# Patient Record
Sex: MALE | Race: WHITE | NOT HISPANIC OR LATINO | URBAN - METROPOLITAN AREA
[De-identification: names, ages, dates, MRNs, and addresses within clinical notes are randomized per-mention and may not be internally consistent; named-entity substitution may affect disease eponyms.]

---

## 2019-06-15 ENCOUNTER — APPOINTMENT (OUTPATIENT)
Dept: CT IMAGING | Facility: HOSPITAL | Age: 69
End: 2019-06-15

## 2019-06-15 ENCOUNTER — HOSPITAL ENCOUNTER (EMERGENCY)
Facility: HOSPITAL | Age: 69
Discharge: HOME | End: 2019-06-16
Attending: EMERGENCY MEDICINE | Admitting: EMERGENCY MEDICINE

## 2019-06-15 DIAGNOSIS — R19.7 DIARRHEA, UNSPECIFIED TYPE: Primary | ICD-10-CM

## 2019-06-15 LAB
ALBUMIN SERPL-MCNC: 4.2 G/DL (ref 3.5–5.2)
ALBUMIN/GLOB SERPL: 1.6 G/DL
ALP SERPL-CCNC: 43 U/L (ref 39–117)
ALT SERPL W P-5'-P-CCNC: 29 U/L (ref 1–41)
ANION GAP SERPL CALCULATED.3IONS-SCNC: 12.4 MMOL/L
AST SERPL-CCNC: 21 U/L (ref 1–40)
BASOPHILS # BLD AUTO: 0.02 10*3/MM3 (ref 0–0.2)
BASOPHILS NFR BLD AUTO: 0.3 % (ref 0–1.5)
BILIRUB SERPL-MCNC: 0.6 MG/DL (ref 0.2–1.2)
BUN BLD-MCNC: 17 MG/DL (ref 8–23)
BUN/CREAT SERPL: 16.2 (ref 7–25)
CALCIUM SPEC-SCNC: 9.3 MG/DL (ref 8.6–10.5)
CHLORIDE SERPL-SCNC: 105 MMOL/L (ref 98–107)
CO2 SERPL-SCNC: 26.6 MMOL/L (ref 22–29)
CREAT BLD-MCNC: 1.05 MG/DL (ref 0.76–1.27)
DEPRECATED RDW RBC AUTO: 42.1 FL (ref 37–54)
EOSINOPHIL # BLD AUTO: 0.22 10*3/MM3 (ref 0–0.4)
EOSINOPHIL NFR BLD AUTO: 3.3 % (ref 0.3–6.2)
ERYTHROCYTE [DISTWIDTH] IN BLOOD BY AUTOMATED COUNT: 11.8 % (ref 12.3–15.4)
GFR SERPL CREATININE-BSD FRML MDRD: 70 ML/MIN/1.73
GLOBULIN UR ELPH-MCNC: 2.7 GM/DL
GLUCOSE BLD-MCNC: 118 MG/DL (ref 65–99)
HCT VFR BLD AUTO: 44.8 % (ref 37.5–51)
HGB BLD-MCNC: 14.8 G/DL (ref 13–17.7)
IMM GRANULOCYTES # BLD AUTO: 0.01 10*3/MM3 (ref 0–0.05)
IMM GRANULOCYTES NFR BLD AUTO: 0.2 % (ref 0–0.5)
LIPASE SERPL-CCNC: 17 U/L (ref 13–60)
LYMPHOCYTES # BLD AUTO: 0.54 10*3/MM3 (ref 0.7–3.1)
LYMPHOCYTES NFR BLD AUTO: 8.2 % (ref 19.6–45.3)
MCH RBC QN AUTO: 32 PG (ref 26.6–33)
MCHC RBC AUTO-ENTMCNC: 33 G/DL (ref 31.5–35.7)
MCV RBC AUTO: 97 FL (ref 79–97)
MONOCYTES # BLD AUTO: 0.54 10*3/MM3 (ref 0.1–0.9)
MONOCYTES NFR BLD AUTO: 8.2 % (ref 5–12)
NEUTROPHILS # BLD AUTO: 5.27 10*3/MM3 (ref 1.7–7)
NEUTROPHILS NFR BLD AUTO: 79.8 % (ref 42.7–76)
NRBC BLD AUTO-RTO: 0 /100 WBC (ref 0–0.2)
PLATELET # BLD AUTO: 143 10*3/MM3 (ref 140–450)
PMV BLD AUTO: 10.5 FL (ref 6–12)
POTASSIUM BLD-SCNC: 4.2 MMOL/L (ref 3.5–5.2)
PROT SERPL-MCNC: 6.9 G/DL (ref 6–8.5)
RBC # BLD AUTO: 4.62 10*6/MM3 (ref 4.14–5.8)
SODIUM BLD-SCNC: 144 MMOL/L (ref 136–145)
WBC NRBC COR # BLD: 6.6 10*3/MM3 (ref 3.4–10.8)

## 2019-06-15 PROCEDURE — 83690 ASSAY OF LIPASE: CPT | Performed by: EMERGENCY MEDICINE

## 2019-06-15 PROCEDURE — 80053 COMPREHEN METABOLIC PANEL: CPT | Performed by: EMERGENCY MEDICINE

## 2019-06-15 PROCEDURE — 25010000002 IOPAMIDOL 61 % SOLUTION: Performed by: EMERGENCY MEDICINE

## 2019-06-15 PROCEDURE — 85025 COMPLETE CBC W/AUTO DIFF WBC: CPT | Performed by: EMERGENCY MEDICINE

## 2019-06-15 PROCEDURE — 74177 CT ABD & PELVIS W/CONTRAST: CPT

## 2019-06-15 PROCEDURE — 99284 EMERGENCY DEPT VISIT MOD MDM: CPT

## 2019-06-15 RX ORDER — ATORVASTATIN CALCIUM 40 MG/1
40 TABLET, FILM COATED ORAL DAILY
COMMUNITY
End: 2021-06-10 | Stop reason: SDUPTHER

## 2019-06-15 RX ORDER — SODIUM CHLORIDE 9 MG/ML
125 INJECTION, SOLUTION INTRAVENOUS CONTINUOUS
Status: DISCONTINUED | OUTPATIENT
Start: 2019-06-15 | End: 2019-06-16 | Stop reason: HOSPADM

## 2019-06-15 RX ORDER — RANITIDINE 150 MG/1
150 TABLET ORAL
COMMUNITY
End: 2021-06-10

## 2019-06-15 RX ORDER — ESOMEPRAZOLE MAGNESIUM 40 MG/1
40 CAPSULE, DELAYED RELEASE ORAL
COMMUNITY
End: 2021-12-10 | Stop reason: SINTOL

## 2019-06-15 RX ORDER — HYDROCHLOROTHIAZIDE 12.5 MG/1
12.5 TABLET ORAL DAILY
COMMUNITY

## 2019-06-15 RX ORDER — MELOXICAM 15 MG/1
15 TABLET ORAL DAILY
COMMUNITY
End: 2021-06-10 | Stop reason: SDUPTHER

## 2019-06-15 RX ORDER — GLIMEPIRIDE 2 MG/1
4 TABLET ORAL 2 TIMES DAILY
COMMUNITY
End: 2021-06-10 | Stop reason: DRUGHIGH

## 2019-06-15 RX ORDER — SODIUM CHLORIDE 0.9 % (FLUSH) 0.9 %
10 SYRINGE (ML) INJECTION AS NEEDED
Status: DISCONTINUED | OUTPATIENT
Start: 2019-06-15 | End: 2019-06-16 | Stop reason: HOSPADM

## 2019-06-15 RX ORDER — MORPHINE SULFATE 2 MG/ML
4 INJECTION, SOLUTION INTRAMUSCULAR; INTRAVENOUS ONCE
Status: COMPLETED | OUTPATIENT
Start: 2019-06-15 | End: 2019-06-16

## 2019-06-15 RX ORDER — LANOLIN ALCOHOL/MO/W.PET/CERES
3 CREAM (GRAM) TOPICAL NIGHTLY
COMMUNITY
End: 2022-05-06

## 2019-06-15 RX ORDER — ONDANSETRON 2 MG/ML
4 INJECTION INTRAMUSCULAR; INTRAVENOUS ONCE
Status: COMPLETED | OUTPATIENT
Start: 2019-06-15 | End: 2019-06-16

## 2019-06-15 RX ORDER — IRBESARTAN 300 MG/1
300 TABLET ORAL DAILY
COMMUNITY
End: 2021-06-10 | Stop reason: SDUPTHER

## 2019-06-15 RX ADMIN — IOPAMIDOL 85 ML: 612 INJECTION, SOLUTION INTRAVENOUS at 23:59

## 2019-06-16 VITALS
OXYGEN SATURATION: 93 % | WEIGHT: 215 LBS | HEIGHT: 73 IN | HEART RATE: 90 BPM | TEMPERATURE: 98.8 F | RESPIRATION RATE: 16 BRPM | BODY MASS INDEX: 28.49 KG/M2 | SYSTOLIC BLOOD PRESSURE: 133 MMHG | DIASTOLIC BLOOD PRESSURE: 74 MMHG

## 2019-06-16 PROCEDURE — 96374 THER/PROPH/DIAG INJ IV PUSH: CPT

## 2019-06-16 PROCEDURE — 25010000002 ONDANSETRON PER 1 MG: Performed by: EMERGENCY MEDICINE

## 2019-06-16 PROCEDURE — 96375 TX/PRO/DX INJ NEW DRUG ADDON: CPT

## 2019-06-16 PROCEDURE — 25010000002 MORPHINE PER 10 MG: Performed by: EMERGENCY MEDICINE

## 2019-06-16 PROCEDURE — 96361 HYDRATE IV INFUSION ADD-ON: CPT

## 2019-06-16 RX ORDER — ONDANSETRON 4 MG/1
4 TABLET, FILM COATED ORAL EVERY 8 HOURS PRN
Qty: 10 TABLET | Refills: 0 | Status: SHIPPED | OUTPATIENT
Start: 2019-06-16 | End: 2022-11-07

## 2019-06-16 RX ORDER — DIPHENOXYLATE HYDROCHLORIDE AND ATROPINE SULFATE 2.5; .025 MG/1; MG/1
1-2 TABLET ORAL 4 TIMES DAILY PRN
Qty: 20 TABLET | Refills: 0 | Status: SHIPPED | OUTPATIENT
Start: 2019-06-16 | End: 2021-06-10

## 2019-06-16 RX ADMIN — MORPHINE SULFATE 4 MG: 2 INJECTION, SOLUTION INTRAMUSCULAR; INTRAVENOUS at 00:07

## 2019-06-16 RX ADMIN — ONDANSETRON 4 MG: 2 INJECTION INTRAMUSCULAR; INTRAVENOUS at 00:05

## 2019-06-16 RX ADMIN — SODIUM CHLORIDE 125 ML/HR: 9 INJECTION, SOLUTION INTRAVENOUS at 00:03

## 2021-06-10 ENCOUNTER — LAB (OUTPATIENT)
Dept: LAB | Facility: HOSPITAL | Age: 71
End: 2021-06-10

## 2021-06-10 ENCOUNTER — OFFICE VISIT (OUTPATIENT)
Dept: INTERNAL MEDICINE | Facility: CLINIC | Age: 71
End: 2021-06-10

## 2021-06-10 VITALS
OXYGEN SATURATION: 98 % | HEART RATE: 59 BPM | HEIGHT: 73 IN | DIASTOLIC BLOOD PRESSURE: 70 MMHG | SYSTOLIC BLOOD PRESSURE: 142 MMHG | WEIGHT: 215.9 LBS | BODY MASS INDEX: 28.61 KG/M2

## 2021-06-10 DIAGNOSIS — K21.9 GASTROESOPHAGEAL REFLUX DISEASE WITHOUT ESOPHAGITIS: ICD-10-CM

## 2021-06-10 DIAGNOSIS — I10 ESSENTIAL HYPERTENSION: ICD-10-CM

## 2021-06-10 DIAGNOSIS — M19.90 ARTHRITIS: ICD-10-CM

## 2021-06-10 DIAGNOSIS — E11.59 TYPE 2 DIABETES MELLITUS WITH OTHER CIRCULATORY COMPLICATION, WITH LONG-TERM CURRENT USE OF INSULIN (HCC): Primary | ICD-10-CM

## 2021-06-10 DIAGNOSIS — Z79.4 TYPE 2 DIABETES MELLITUS WITH OTHER CIRCULATORY COMPLICATION, WITH LONG-TERM CURRENT USE OF INSULIN (HCC): Primary | ICD-10-CM

## 2021-06-10 DIAGNOSIS — K57.90 DIVERTICULOSIS: ICD-10-CM

## 2021-06-10 DIAGNOSIS — E78.2 MIXED HYPERLIPIDEMIA: ICD-10-CM

## 2021-06-10 LAB
ALBUMIN SERPL-MCNC: 4.6 G/DL (ref 3.5–5.2)
ALBUMIN UR-MCNC: 1.2 MG/DL
ALBUMIN/GLOB SERPL: 1.5 G/DL
ALP SERPL-CCNC: 49 U/L (ref 39–117)
ALT SERPL W P-5'-P-CCNC: 39 U/L (ref 1–41)
ANION GAP SERPL CALCULATED.3IONS-SCNC: 9.2 MMOL/L (ref 5–15)
AST SERPL-CCNC: 27 U/L (ref 1–40)
BILIRUB SERPL-MCNC: 0.4 MG/DL (ref 0–1.2)
BUN SERPL-MCNC: 26 MG/DL (ref 8–23)
BUN/CREAT SERPL: 33.8 (ref 7–25)
CALCIUM SPEC-SCNC: 9.7 MG/DL (ref 8.6–10.5)
CHLORIDE SERPL-SCNC: 102 MMOL/L (ref 98–107)
CHOLEST SERPL-MCNC: 227 MG/DL (ref 0–200)
CO2 SERPL-SCNC: 27.8 MMOL/L (ref 22–29)
CREAT SERPL-MCNC: 0.77 MG/DL (ref 0.76–1.27)
CREAT UR-MCNC: 140 MG/DL
GFR SERPL CREATININE-BSD FRML MDRD: 100 ML/MIN/1.73
GLOBULIN UR ELPH-MCNC: 3.1 GM/DL
GLUCOSE SERPL-MCNC: 179 MG/DL (ref 65–99)
HBA1C MFR BLD: 7.5 % (ref 4.8–5.6)
HDLC SERPL-MCNC: 49 MG/DL (ref 40–60)
LDLC SERPL CALC-MCNC: 156 MG/DL (ref 0–100)
LDLC/HDLC SERPL: 3.13 {RATIO}
MICROALBUMIN/CREAT UR: 8.6 MG/G
POTASSIUM SERPL-SCNC: 5 MMOL/L (ref 3.5–5.2)
PROT SERPL-MCNC: 7.7 G/DL (ref 6–8.5)
SODIUM SERPL-SCNC: 139 MMOL/L (ref 136–145)
TRIGL SERPL-MCNC: 122 MG/DL (ref 0–150)
VLDLC SERPL-MCNC: 22 MG/DL (ref 5–40)

## 2021-06-10 PROCEDURE — 83036 HEMOGLOBIN GLYCOSYLATED A1C: CPT | Performed by: FAMILY MEDICINE

## 2021-06-10 PROCEDURE — 82570 ASSAY OF URINE CREATININE: CPT | Performed by: FAMILY MEDICINE

## 2021-06-10 PROCEDURE — 80061 LIPID PANEL: CPT | Performed by: FAMILY MEDICINE

## 2021-06-10 PROCEDURE — 82043 UR ALBUMIN QUANTITATIVE: CPT | Performed by: FAMILY MEDICINE

## 2021-06-10 PROCEDURE — 80053 COMPREHEN METABOLIC PANEL: CPT | Performed by: FAMILY MEDICINE

## 2021-06-10 PROCEDURE — 36415 COLL VENOUS BLD VENIPUNCTURE: CPT | Performed by: FAMILY MEDICINE

## 2021-06-10 PROCEDURE — 99204 OFFICE O/P NEW MOD 45 MIN: CPT | Performed by: FAMILY MEDICINE

## 2021-06-10 RX ORDER — GLIMEPIRIDE 4 MG/1
4 TABLET ORAL 2 TIMES DAILY
COMMUNITY
Start: 2021-05-22

## 2021-06-10 RX ORDER — MECLIZINE HYDROCHLORIDE 25 MG/1
25 TABLET ORAL 3 TIMES DAILY PRN
COMMUNITY
End: 2022-11-07

## 2021-06-10 RX ORDER — VIT C/B6/B5/MAGNESIUM/HERB 173 50-5-6-5MG
1000 CAPSULE ORAL DAILY
COMMUNITY

## 2021-06-10 RX ORDER — IRBESARTAN 300 MG/1
300 TABLET ORAL DAILY
Qty: 90 TABLET | Refills: 3 | Status: SHIPPED | OUTPATIENT
Start: 2021-06-10 | End: 2022-08-23

## 2021-06-10 RX ORDER — LANOLIN ALCOHOL/MO/W.PET/CERES
3 CREAM (GRAM) TOPICAL DAILY
COMMUNITY
End: 2022-05-06

## 2021-06-10 RX ORDER — MELOXICAM 15 MG/1
15 TABLET ORAL DAILY
Qty: 90 TABLET | Refills: 1 | Status: SHIPPED | OUTPATIENT
Start: 2021-06-10 | End: 2021-12-06

## 2021-06-10 RX ORDER — ATORVASTATIN CALCIUM 40 MG/1
40 TABLET, FILM COATED ORAL DAILY
Qty: 90 TABLET | Refills: 3 | Status: SHIPPED | OUTPATIENT
Start: 2021-06-10 | End: 2022-08-25

## 2021-06-10 RX ORDER — DULAGLUTIDE 0.75 MG/.5ML
INJECTION, SOLUTION SUBCUTANEOUS
COMMUNITY
Start: 2021-05-27

## 2021-06-10 RX ORDER — NAPROXEN 500 MG/1
500 TABLET ORAL 2 TIMES DAILY PRN
COMMUNITY
Start: 2020-12-04 | End: 2021-12-04

## 2021-09-20 ENCOUNTER — TELEPHONE (OUTPATIENT)
Dept: INTERNAL MEDICINE | Facility: CLINIC | Age: 71
End: 2021-09-20

## 2021-12-06 RX ORDER — MELOXICAM 15 MG/1
TABLET ORAL
Qty: 90 TABLET | Refills: 0 | Status: SHIPPED | OUTPATIENT
Start: 2021-12-06 | End: 2022-02-25

## 2021-12-10 ENCOUNTER — OFFICE VISIT (OUTPATIENT)
Dept: INTERNAL MEDICINE | Facility: CLINIC | Age: 71
End: 2021-12-10

## 2021-12-10 ENCOUNTER — LAB (OUTPATIENT)
Dept: LAB | Facility: HOSPITAL | Age: 71
End: 2021-12-10

## 2021-12-10 VITALS
BODY MASS INDEX: 28.97 KG/M2 | WEIGHT: 218.6 LBS | OXYGEN SATURATION: 98 % | SYSTOLIC BLOOD PRESSURE: 134 MMHG | HEART RATE: 72 BPM | HEIGHT: 73 IN | DIASTOLIC BLOOD PRESSURE: 64 MMHG

## 2021-12-10 DIAGNOSIS — K21.9 GASTROESOPHAGEAL REFLUX DISEASE WITHOUT ESOPHAGITIS: ICD-10-CM

## 2021-12-10 DIAGNOSIS — I10 ESSENTIAL HYPERTENSION: Primary | ICD-10-CM

## 2021-12-10 DIAGNOSIS — Z12.11 COLON CANCER SCREENING: ICD-10-CM

## 2021-12-10 DIAGNOSIS — K57.90 DIVERTICULOSIS: ICD-10-CM

## 2021-12-10 DIAGNOSIS — E78.2 MIXED HYPERLIPIDEMIA: ICD-10-CM

## 2021-12-10 DIAGNOSIS — Z00.00 MEDICARE ANNUAL WELLNESS VISIT, SUBSEQUENT: ICD-10-CM

## 2021-12-10 LAB
ALBUMIN SERPL-MCNC: 4.4 G/DL (ref 3.5–5.2)
ALBUMIN/GLOB SERPL: 1.4 G/DL
ALP SERPL-CCNC: 52 U/L (ref 39–117)
ALT SERPL W P-5'-P-CCNC: 26 U/L (ref 1–41)
ANION GAP SERPL CALCULATED.3IONS-SCNC: 9.8 MMOL/L (ref 5–15)
AST SERPL-CCNC: 17 U/L (ref 1–40)
BILIRUB SERPL-MCNC: 0.5 MG/DL (ref 0–1.2)
BUN SERPL-MCNC: 22 MG/DL (ref 8–23)
BUN/CREAT SERPL: 18 (ref 7–25)
CALCIUM SPEC-SCNC: 9.6 MG/DL (ref 8.6–10.5)
CHLORIDE SERPL-SCNC: 103 MMOL/L (ref 98–107)
CHOLEST SERPL-MCNC: 138 MG/DL (ref 0–200)
CO2 SERPL-SCNC: 29.2 MMOL/L (ref 22–29)
CREAT SERPL-MCNC: 1.22 MG/DL (ref 0.76–1.27)
GFR SERPL CREATININE-BSD FRML MDRD: 58 ML/MIN/1.73
GLOBULIN UR ELPH-MCNC: 3.1 GM/DL
GLUCOSE SERPL-MCNC: 209 MG/DL (ref 65–99)
HDLC SERPL-MCNC: 51 MG/DL (ref 40–60)
LDLC SERPL CALC-MCNC: 72 MG/DL (ref 0–100)
LDLC/HDLC SERPL: 1.42 {RATIO}
POTASSIUM SERPL-SCNC: 5 MMOL/L (ref 3.5–5.2)
PROT SERPL-MCNC: 7.5 G/DL (ref 6–8.5)
SODIUM SERPL-SCNC: 142 MMOL/L (ref 136–145)
TRIGL SERPL-MCNC: 73 MG/DL (ref 0–150)
VLDLC SERPL-MCNC: 15 MG/DL (ref 5–40)

## 2021-12-10 PROCEDURE — 1170F FXNL STATUS ASSESSED: CPT | Performed by: FAMILY MEDICINE

## 2021-12-10 PROCEDURE — G0439 PPPS, SUBSEQ VISIT: HCPCS | Performed by: FAMILY MEDICINE

## 2021-12-10 PROCEDURE — 1125F AMNT PAIN NOTED PAIN PRSNT: CPT | Performed by: FAMILY MEDICINE

## 2021-12-10 PROCEDURE — 1160F RVW MEDS BY RX/DR IN RCRD: CPT | Performed by: FAMILY MEDICINE

## 2021-12-10 PROCEDURE — 80061 LIPID PANEL: CPT | Performed by: FAMILY MEDICINE

## 2021-12-10 PROCEDURE — 99214 OFFICE O/P EST MOD 30 MIN: CPT | Performed by: FAMILY MEDICINE

## 2021-12-10 PROCEDURE — 80053 COMPREHEN METABOLIC PANEL: CPT | Performed by: FAMILY MEDICINE

## 2021-12-10 PROCEDURE — 36415 COLL VENOUS BLD VENIPUNCTURE: CPT | Performed by: FAMILY MEDICINE

## 2021-12-10 RX ORDER — CLOTRIMAZOLE AND BETAMETHASONE DIPROPIONATE 10; .64 MG/G; MG/G
CREAM TOPICAL EVERY 12 HOURS
COMMUNITY
Start: 2021-09-16 | End: 2022-03-15

## 2021-12-10 RX ORDER — FAMOTIDINE 40 MG/1
40 TABLET, FILM COATED ORAL NIGHTLY PRN
Qty: 90 TABLET | Refills: 3 | Status: SHIPPED | OUTPATIENT
Start: 2021-12-10 | End: 2022-11-07

## 2021-12-10 RX ORDER — TERBINAFINE HYDROCHLORIDE 250 MG/1
250 TABLET ORAL DAILY
COMMUNITY
Start: 2021-11-11 | End: 2022-11-07

## 2022-02-25 RX ORDER — MELOXICAM 15 MG/1
TABLET ORAL
Qty: 90 TABLET | Refills: 0 | Status: SHIPPED | OUTPATIENT
Start: 2022-02-25 | End: 2022-07-20

## 2022-05-06 ENCOUNTER — OFFICE VISIT (OUTPATIENT)
Dept: INTERNAL MEDICINE | Facility: CLINIC | Age: 72
End: 2022-05-06

## 2022-05-06 VITALS
DIASTOLIC BLOOD PRESSURE: 68 MMHG | HEIGHT: 73 IN | OXYGEN SATURATION: 96 % | HEART RATE: 61 BPM | BODY MASS INDEX: 28.71 KG/M2 | WEIGHT: 216.6 LBS | SYSTOLIC BLOOD PRESSURE: 118 MMHG

## 2022-05-06 DIAGNOSIS — Z12.11 SCREENING FOR COLON CANCER: ICD-10-CM

## 2022-05-06 DIAGNOSIS — I10 ESSENTIAL HYPERTENSION: Primary | ICD-10-CM

## 2022-05-06 DIAGNOSIS — K21.9 GASTROESOPHAGEAL REFLUX DISEASE WITHOUT ESOPHAGITIS: ICD-10-CM

## 2022-05-06 DIAGNOSIS — E78.2 MIXED HYPERLIPIDEMIA: ICD-10-CM

## 2022-05-06 PROCEDURE — 99214 OFFICE O/P EST MOD 30 MIN: CPT | Performed by: FAMILY MEDICINE

## 2022-05-24 ENCOUNTER — PRE-PROCEDURE SCREENING (OUTPATIENT)
Dept: GASTROENTEROLOGY | Facility: CLINIC | Age: 72
End: 2022-05-24

## 2022-07-20 RX ORDER — MELOXICAM 15 MG/1
TABLET ORAL
Qty: 90 TABLET | Refills: 0 | Status: SHIPPED | OUTPATIENT
Start: 2022-07-20 | End: 2022-10-13

## 2022-08-23 DIAGNOSIS — I10 ESSENTIAL HYPERTENSION: ICD-10-CM

## 2022-08-23 RX ORDER — IRBESARTAN 300 MG/1
TABLET ORAL
Qty: 90 TABLET | Refills: 0 | Status: SHIPPED | OUTPATIENT
Start: 2022-08-23 | End: 2022-11-07 | Stop reason: SDUPTHER

## 2022-08-24 DIAGNOSIS — E78.2 MIXED HYPERLIPIDEMIA: ICD-10-CM

## 2022-08-25 RX ORDER — ATORVASTATIN CALCIUM 40 MG/1
TABLET, FILM COATED ORAL
Qty: 90 TABLET | Refills: 0 | Status: SHIPPED | OUTPATIENT
Start: 2022-08-25 | End: 2022-11-07 | Stop reason: SDUPTHER

## 2022-10-07 ENCOUNTER — OFFICE VISIT (OUTPATIENT)
Dept: INTERNAL MEDICINE | Facility: CLINIC | Age: 72
End: 2022-10-07

## 2022-10-07 VITALS
OXYGEN SATURATION: 97 % | DIASTOLIC BLOOD PRESSURE: 70 MMHG | HEART RATE: 78 BPM | SYSTOLIC BLOOD PRESSURE: 120 MMHG | RESPIRATION RATE: 18 BRPM | HEIGHT: 73 IN | BODY MASS INDEX: 28.23 KG/M2 | WEIGHT: 213 LBS

## 2022-10-07 DIAGNOSIS — H61.22 LEFT EAR IMPACTED CERUMEN: Primary | ICD-10-CM

## 2022-10-07 PROCEDURE — 99213 OFFICE O/P EST LOW 20 MIN: CPT | Performed by: NURSE PRACTITIONER

## 2022-10-07 RX ORDER — PEN NEEDLE, DIABETIC 32GX 5/32"
1 NEEDLE, DISPOSABLE MISCELLANEOUS 3 TIMES DAILY
COMMUNITY
Start: 2022-09-22

## 2022-10-13 RX ORDER — MELOXICAM 15 MG/1
TABLET ORAL
Qty: 90 TABLET | Refills: 0 | Status: SHIPPED | OUTPATIENT
Start: 2022-10-13 | End: 2022-12-30

## 2022-11-07 ENCOUNTER — OFFICE VISIT (OUTPATIENT)
Dept: INTERNAL MEDICINE | Facility: CLINIC | Age: 72
End: 2022-11-07

## 2022-11-07 VITALS
SYSTOLIC BLOOD PRESSURE: 120 MMHG | BODY MASS INDEX: 27.65 KG/M2 | WEIGHT: 208.6 LBS | HEIGHT: 73 IN | HEART RATE: 72 BPM | OXYGEN SATURATION: 94 % | DIASTOLIC BLOOD PRESSURE: 60 MMHG

## 2022-11-07 DIAGNOSIS — Z12.11 COLON CANCER SCREENING: ICD-10-CM

## 2022-11-07 DIAGNOSIS — E78.2 MIXED HYPERLIPIDEMIA: ICD-10-CM

## 2022-11-07 DIAGNOSIS — I10 ESSENTIAL HYPERTENSION: Primary | ICD-10-CM

## 2022-11-07 DIAGNOSIS — J40 BRONCHITIS: ICD-10-CM

## 2022-11-07 PROCEDURE — 99214 OFFICE O/P EST MOD 30 MIN: CPT | Performed by: FAMILY MEDICINE

## 2022-11-07 RX ORDER — AZITHROMYCIN 250 MG/1
TABLET, FILM COATED ORAL
Qty: 6 TABLET | Refills: 0 | Status: SHIPPED | OUTPATIENT
Start: 2022-11-07

## 2022-11-07 RX ORDER — IRBESARTAN 300 MG/1
300 TABLET ORAL DAILY
Qty: 90 TABLET | Refills: 2 | Status: SHIPPED | OUTPATIENT
Start: 2022-11-07

## 2022-11-07 RX ORDER — ATORVASTATIN CALCIUM 40 MG/1
40 TABLET, FILM COATED ORAL DAILY
Qty: 90 TABLET | Refills: 2 | Status: SHIPPED | OUTPATIENT
Start: 2022-11-07

## 2022-12-27 ENCOUNTER — PREP FOR SURGERY (OUTPATIENT)
Dept: OTHER | Facility: HOSPITAL | Age: 72
End: 2022-12-27

## 2022-12-27 DIAGNOSIS — Z12.11 COLON CANCER SCREENING: Primary | ICD-10-CM

## 2022-12-30 RX ORDER — MELOXICAM 15 MG/1
TABLET ORAL
Qty: 90 TABLET | Refills: 0 | Status: SHIPPED | OUTPATIENT
Start: 2022-12-30

## 2023-03-26 ENCOUNTER — APPOINTMENT (OUTPATIENT)
Dept: GENERAL RADIOLOGY | Facility: HOSPITAL | Age: 73
End: 2023-03-26
Payer: MEDICARE

## 2023-03-26 ENCOUNTER — APPOINTMENT (OUTPATIENT)
Dept: CT IMAGING | Facility: HOSPITAL | Age: 73
End: 2023-03-26
Payer: MEDICARE

## 2023-03-26 ENCOUNTER — HOSPITAL ENCOUNTER (EMERGENCY)
Facility: HOSPITAL | Age: 73
Discharge: HOME | End: 2023-03-27
Attending: EMERGENCY MEDICINE | Admitting: EMERGENCY MEDICINE
Payer: MEDICARE

## 2023-03-26 DIAGNOSIS — S30.0XXA CONTUSION OF COCCYX, INITIAL ENCOUNTER: ICD-10-CM

## 2023-03-26 DIAGNOSIS — S20.212A RIB CONTUSION, LEFT, INITIAL ENCOUNTER: ICD-10-CM

## 2023-03-26 DIAGNOSIS — S09.90XA INJURY OF HEAD, INITIAL ENCOUNTER: Primary | ICD-10-CM

## 2023-03-26 DIAGNOSIS — S39.012A STRAIN OF LUMBAR REGION, INITIAL ENCOUNTER: ICD-10-CM

## 2023-03-26 DIAGNOSIS — S16.1XXA STRAIN OF NECK MUSCLE, INITIAL ENCOUNTER: ICD-10-CM

## 2023-03-26 DIAGNOSIS — S43.402A SPRAIN OF LEFT SHOULDER, UNSPECIFIED SHOULDER SPRAIN TYPE, INITIAL ENCOUNTER: ICD-10-CM

## 2023-03-26 DIAGNOSIS — S93.492A SPRAIN OF ANTERIOR TALOFIBULAR LIGAMENT OF LEFT ANKLE, INITIAL ENCOUNTER: ICD-10-CM

## 2023-03-26 DIAGNOSIS — S29.019A THORACIC MYOFASCIAL STRAIN, INITIAL ENCOUNTER: ICD-10-CM

## 2023-03-26 PROCEDURE — 73610 X-RAY EXAM OF ANKLE: CPT

## 2023-03-26 PROCEDURE — 70450 CT HEAD/BRAIN W/O DYE: CPT

## 2023-03-26 PROCEDURE — 72220 X-RAY EXAM SACRUM TAILBONE: CPT

## 2023-03-26 PROCEDURE — 72125 CT NECK SPINE W/O DYE: CPT

## 2023-03-26 PROCEDURE — 72110 X-RAY EXAM L-2 SPINE 4/>VWS: CPT

## 2023-03-26 PROCEDURE — 71250 CT THORAX DX C-: CPT

## 2023-03-26 PROCEDURE — 72170 X-RAY EXAM OF PELVIS: CPT

## 2023-03-26 PROCEDURE — 99283 EMERGENCY DEPT VISIT LOW MDM: CPT

## 2023-03-26 RX ORDER — OXYCODONE HYDROCHLORIDE AND ACETAMINOPHEN 5; 325 MG/1; MG/1
1 TABLET ORAL ONCE
Status: COMPLETED | OUTPATIENT
Start: 2023-03-26 | End: 2023-03-27

## 2023-03-27 ENCOUNTER — APPOINTMENT (OUTPATIENT)
Dept: GENERAL RADIOLOGY | Facility: HOSPITAL | Age: 73
End: 2023-03-27
Payer: MEDICARE

## 2023-03-27 VITALS
RESPIRATION RATE: 17 BRPM | SYSTOLIC BLOOD PRESSURE: 164 MMHG | BODY MASS INDEX: 29.5 KG/M2 | DIASTOLIC BLOOD PRESSURE: 75 MMHG | TEMPERATURE: 98 F | OXYGEN SATURATION: 95 % | WEIGHT: 217.81 LBS | HEART RATE: 71 BPM | HEIGHT: 72 IN

## 2023-03-27 PROCEDURE — 73030 X-RAY EXAM OF SHOULDER: CPT

## 2023-03-27 RX ORDER — HYDROCODONE BITARTRATE AND ACETAMINOPHEN 5; 325 MG/1; MG/1
1 TABLET ORAL EVERY 6 HOURS PRN
Qty: 12 TABLET | Refills: 0 | Status: SHIPPED | OUTPATIENT
Start: 2023-03-27

## 2023-03-27 RX ORDER — HYDROCODONE BITARTRATE AND ACETAMINOPHEN 7.5; 325 MG/1; MG/1
1 TABLET ORAL ONCE
Status: COMPLETED | OUTPATIENT
Start: 2023-03-27 | End: 2023-03-27

## 2023-03-27 RX ORDER — METHOCARBAMOL 750 MG/1
750 TABLET, FILM COATED ORAL 3 TIMES DAILY PRN
Qty: 21 TABLET | Refills: 0 | Status: SHIPPED | OUTPATIENT
Start: 2023-03-27

## 2023-03-27 RX ADMIN — OXYCODONE AND ACETAMINOPHEN 1 TABLET: 5; 325 TABLET ORAL at 00:02

## 2023-03-27 RX ADMIN — HYDROCODONE BITARTRATE AND ACETAMINOPHEN 1 TABLET: 7.5; 325 TABLET ORAL at 03:47

## 2023-04-25 RX ORDER — MELOXICAM 15 MG/1
TABLET ORAL
Qty: 90 TABLET | Refills: 0 | Status: SHIPPED | OUTPATIENT
Start: 2023-04-25

## 2023-05-09 ENCOUNTER — OFFICE VISIT (OUTPATIENT)
Dept: INTERNAL MEDICINE | Facility: CLINIC | Age: 73
End: 2023-05-09
Payer: MEDICARE

## 2023-05-09 VITALS
HEART RATE: 72 BPM | WEIGHT: 213.8 LBS | SYSTOLIC BLOOD PRESSURE: 146 MMHG | HEIGHT: 72 IN | BODY MASS INDEX: 28.96 KG/M2 | OXYGEN SATURATION: 96 % | DIASTOLIC BLOOD PRESSURE: 80 MMHG

## 2023-05-09 DIAGNOSIS — N52.1 ERECTILE DYSFUNCTION DUE TO DISEASES CLASSIFIED ELSEWHERE: ICD-10-CM

## 2023-05-09 DIAGNOSIS — E11.59 TYPE 2 DIABETES MELLITUS WITH OTHER CIRCULATORY COMPLICATION, WITH LONG-TERM CURRENT USE OF INSULIN: ICD-10-CM

## 2023-05-09 DIAGNOSIS — R41.3 MEMORY LOSS: ICD-10-CM

## 2023-05-09 DIAGNOSIS — I10 ESSENTIAL HYPERTENSION: ICD-10-CM

## 2023-05-09 DIAGNOSIS — Z00.00 MEDICARE ANNUAL WELLNESS VISIT, SUBSEQUENT: Primary | ICD-10-CM

## 2023-05-09 DIAGNOSIS — E78.2 MIXED HYPERLIPIDEMIA: ICD-10-CM

## 2023-05-09 DIAGNOSIS — Z79.4 TYPE 2 DIABETES MELLITUS WITH OTHER CIRCULATORY COMPLICATION, WITH LONG-TERM CURRENT USE OF INSULIN: ICD-10-CM

## 2023-05-09 DIAGNOSIS — R53.82 CHRONIC FATIGUE: ICD-10-CM

## 2023-05-09 DIAGNOSIS — M25.572 ACUTE LEFT ANKLE PAIN: ICD-10-CM

## 2023-05-13 LAB
BASOPHILS # BLD AUTO: 0.06 10*3/MM3 (ref 0–0.2)
BASOPHILS NFR BLD AUTO: 0.8 % (ref 0–1.5)
EOSINOPHIL # BLD AUTO: 0.45 10*3/MM3 (ref 0–0.4)
EOSINOPHIL NFR BLD AUTO: 6.2 % (ref 0.3–6.2)
ERYTHROCYTE [DISTWIDTH] IN BLOOD BY AUTOMATED COUNT: 11.3 % (ref 12.3–15.4)
HCT VFR BLD AUTO: 43.2 % (ref 37.5–51)
HGB BLD-MCNC: 14.5 G/DL (ref 13–17.7)
IMM GRANULOCYTES # BLD AUTO: 0.01 10*3/MM3 (ref 0–0.05)
IMM GRANULOCYTES NFR BLD AUTO: 0.1 % (ref 0–0.5)
LYMPHOCYTES # BLD AUTO: 1.52 10*3/MM3 (ref 0.7–3.1)
LYMPHOCYTES NFR BLD AUTO: 20.9 % (ref 19.6–45.3)
MCH RBC QN AUTO: 32.4 PG (ref 26.6–33)
MCHC RBC AUTO-ENTMCNC: 33.6 G/DL (ref 31.5–35.7)
MCV RBC AUTO: 96.4 FL (ref 79–97)
MONOCYTES # BLD AUTO: 0.66 10*3/MM3 (ref 0.1–0.9)
MONOCYTES NFR BLD AUTO: 9.1 % (ref 5–12)
NEUTROPHILS # BLD AUTO: 4.59 10*3/MM3 (ref 1.7–7)
NEUTROPHILS NFR BLD AUTO: 62.9 % (ref 42.7–76)
NRBC BLD AUTO-RTO: 0 /100 WBC (ref 0–0.2)
PLATELET # BLD AUTO: 206 10*3/MM3 (ref 140–450)
RBC # BLD AUTO: 4.48 10*6/MM3 (ref 4.14–5.8)
TESTOST FREE SERPL-MCNC: >50 PG/ML (ref 6.6–18.1)
TESTOST SERPL-MCNC: 487.3 NG/DL (ref 264–916)
WBC # BLD AUTO: 7.29 10*3/MM3 (ref 3.4–10.8)

## 2023-05-25 LAB
BASOPHILS # BLD AUTO: 0.05 10*3/MM3 (ref 0–0.2)
BASOPHILS NFR BLD AUTO: 0.7 % (ref 0–1.5)
EOSINOPHIL # BLD AUTO: 0.54 10*3/MM3 (ref 0–0.4)
EOSINOPHIL NFR BLD AUTO: 7.6 % (ref 0.3–6.2)
ERYTHROCYTE [DISTWIDTH] IN BLOOD BY AUTOMATED COUNT: 11 % (ref 12.3–15.4)
HCT VFR BLD AUTO: 41.6 % (ref 37.5–51)
HGB BLD-MCNC: 13.7 G/DL (ref 13–17.7)
IMM GRANULOCYTES # BLD AUTO: 0.02 10*3/MM3 (ref 0–0.05)
IMM GRANULOCYTES NFR BLD AUTO: 0.3 % (ref 0–0.5)
LYMPHOCYTES # BLD AUTO: 1.37 10*3/MM3 (ref 0.7–3.1)
LYMPHOCYTES NFR BLD AUTO: 19.4 % (ref 19.6–45.3)
MCH RBC QN AUTO: 31.7 PG (ref 26.6–33)
MCHC RBC AUTO-ENTMCNC: 32.9 G/DL (ref 31.5–35.7)
MCV RBC AUTO: 96.3 FL (ref 79–97)
MONOCYTES # BLD AUTO: 0.53 10*3/MM3 (ref 0.1–0.9)
MONOCYTES NFR BLD AUTO: 7.5 % (ref 5–12)
NEUTROPHILS # BLD AUTO: 4.56 10*3/MM3 (ref 1.7–7)
NEUTROPHILS NFR BLD AUTO: 64.5 % (ref 42.7–76)
NRBC BLD AUTO-RTO: 0 /100 WBC (ref 0–0.2)
PLATELET # BLD AUTO: 228 10*3/MM3 (ref 140–450)
RBC # BLD AUTO: 4.32 10*6/MM3 (ref 4.14–5.8)
WBC # BLD AUTO: 7.07 10*3/MM3 (ref 3.4–10.8)

## 2023-06-14 ENCOUNTER — APPOINTMENT (OUTPATIENT)
Dept: GENERAL RADIOLOGY | Facility: HOSPITAL | Age: 73
End: 2023-06-14
Payer: MEDICARE

## 2023-06-14 ENCOUNTER — HOSPITAL ENCOUNTER (INPATIENT)
Facility: HOSPITAL | Age: 73
LOS: 2 days | Discharge: HOME | End: 2023-06-16
Attending: EMERGENCY MEDICINE | Admitting: INTERNAL MEDICINE
Payer: MEDICARE

## 2023-06-14 ENCOUNTER — APPOINTMENT (OUTPATIENT)
Dept: CT IMAGING | Facility: HOSPITAL | Age: 73
End: 2023-06-14
Payer: MEDICARE

## 2023-06-14 DIAGNOSIS — E78.2 MIXED HYPERLIPIDEMIA: ICD-10-CM

## 2023-06-14 DIAGNOSIS — E11.59 TYPE 2 DIABETES MELLITUS WITH OTHER CIRCULATORY COMPLICATION, WITH LONG-TERM CURRENT USE OF INSULIN: ICD-10-CM

## 2023-06-14 DIAGNOSIS — I26.09 OTHER ACUTE PULMONARY EMBOLISM WITH ACUTE COR PULMONALE: Primary | ICD-10-CM

## 2023-06-14 DIAGNOSIS — Z79.4 TYPE 2 DIABETES MELLITUS WITH OTHER CIRCULATORY COMPLICATION, WITH LONG-TERM CURRENT USE OF INSULIN: ICD-10-CM

## 2023-06-14 DIAGNOSIS — I10 ESSENTIAL HYPERTENSION: ICD-10-CM

## 2023-06-14 LAB
ALBUMIN SERPL-MCNC: 4.1 G/DL (ref 3.5–5.2)
ALBUMIN/GLOB SERPL: 1.4 G/DL
ALP SERPL-CCNC: 62 U/L (ref 39–117)
ALT SERPL W P-5'-P-CCNC: 22 U/L (ref 1–41)
ANION GAP SERPL CALCULATED.3IONS-SCNC: 11.3 MMOL/L (ref 5–15)
APTT PPP: 26.5 SECONDS (ref 22.7–35.4)
AST SERPL-CCNC: 18 U/L (ref 1–40)
BASOPHILS # BLD AUTO: 0.04 10*3/MM3 (ref 0–0.2)
BASOPHILS NFR BLD AUTO: 0.6 % (ref 0–1.5)
BILIRUB SERPL-MCNC: 0.5 MG/DL (ref 0–1.2)
BUN SERPL-MCNC: 23 MG/DL (ref 8–23)
BUN/CREAT SERPL: 21.9 (ref 7–25)
CALCIUM SPEC-SCNC: 9.6 MG/DL (ref 8.6–10.5)
CHLORIDE SERPL-SCNC: 103 MMOL/L (ref 98–107)
CO2 SERPL-SCNC: 23.7 MMOL/L (ref 22–29)
CREAT SERPL-MCNC: 1.05 MG/DL (ref 0.76–1.27)
D DIMER PPP FEU-MCNC: 2.4 MCGFEU/ML (ref 0–0.73)
DEPRECATED RDW RBC AUTO: 38.9 FL (ref 37–54)
EGFRCR SERPLBLD CKD-EPI 2021: 75 ML/MIN/1.73
EOSINOPHIL # BLD AUTO: 0.17 10*3/MM3 (ref 0–0.4)
EOSINOPHIL NFR BLD AUTO: 2.6 % (ref 0.3–6.2)
ERYTHROCYTE [DISTWIDTH] IN BLOOD BY AUTOMATED COUNT: 11.4 % (ref 12.3–15.4)
GLOBULIN UR ELPH-MCNC: 2.9 GM/DL
GLUCOSE BLDC GLUCOMTR-MCNC: 175 MG/DL (ref 70–130)
GLUCOSE SERPL-MCNC: 228 MG/DL (ref 65–99)
HCT VFR BLD AUTO: 38.9 % (ref 37.5–51)
HGB BLD-MCNC: 13.1 G/DL (ref 13–17.7)
IMM GRANULOCYTES # BLD AUTO: 0.01 10*3/MM3 (ref 0–0.05)
IMM GRANULOCYTES NFR BLD AUTO: 0.2 % (ref 0–0.5)
INR PPP: 1 (ref 0.9–1.1)
LYMPHOCYTES # BLD AUTO: 1.13 10*3/MM3 (ref 0.7–3.1)
LYMPHOCYTES NFR BLD AUTO: 17.2 % (ref 19.6–45.3)
MCH RBC QN AUTO: 31.7 PG (ref 26.6–33)
MCHC RBC AUTO-ENTMCNC: 33.7 G/DL (ref 31.5–35.7)
MCV RBC AUTO: 94.2 FL (ref 79–97)
MONOCYTES # BLD AUTO: 0.5 10*3/MM3 (ref 0.1–0.9)
MONOCYTES NFR BLD AUTO: 7.6 % (ref 5–12)
NEUTROPHILS NFR BLD AUTO: 4.71 10*3/MM3 (ref 1.7–7)
NEUTROPHILS NFR BLD AUTO: 71.8 % (ref 42.7–76)
NRBC BLD AUTO-RTO: 0 /100 WBC (ref 0–0.2)
PLATELET # BLD AUTO: 185 10*3/MM3 (ref 140–450)
PMV BLD AUTO: 9.9 FL (ref 6–12)
POTASSIUM SERPL-SCNC: 4.3 MMOL/L (ref 3.5–5.2)
PROT SERPL-MCNC: 7 G/DL (ref 6–8.5)
PROTHROMBIN TIME: 13.2 SECONDS (ref 11.7–14.2)
QT INTERVAL: 382 MS
RBC # BLD AUTO: 4.13 10*6/MM3 (ref 4.14–5.8)
SODIUM SERPL-SCNC: 138 MMOL/L (ref 136–145)
TROPONIN T SERPL HS-MCNC: 15 NG/L
WBC NRBC COR # BLD: 6.56 10*3/MM3 (ref 3.4–10.8)

## 2023-06-14 PROCEDURE — 82948 REAGENT STRIP/BLOOD GLUCOSE: CPT

## 2023-06-14 PROCEDURE — 93005 ELECTROCARDIOGRAM TRACING: CPT | Performed by: EMERGENCY MEDICINE

## 2023-06-14 PROCEDURE — 25510000001 IOPAMIDOL PER 1 ML: Performed by: EMERGENCY MEDICINE

## 2023-06-14 PROCEDURE — 25010000002 HEPARIN (PORCINE) PER 1000 UNITS: Performed by: EMERGENCY MEDICINE

## 2023-06-14 PROCEDURE — 85379 FIBRIN DEGRADATION QUANT: CPT | Performed by: EMERGENCY MEDICINE

## 2023-06-14 PROCEDURE — 85610 PROTHROMBIN TIME: CPT | Performed by: EMERGENCY MEDICINE

## 2023-06-14 PROCEDURE — 25010000002 HEPARIN (PORCINE) 25000-0.45 UT/250ML-% SOLUTION: Performed by: EMERGENCY MEDICINE

## 2023-06-14 PROCEDURE — 84484 ASSAY OF TROPONIN QUANT: CPT | Performed by: EMERGENCY MEDICINE

## 2023-06-14 PROCEDURE — 85730 THROMBOPLASTIN TIME PARTIAL: CPT | Performed by: EMERGENCY MEDICINE

## 2023-06-14 PROCEDURE — 71275 CT ANGIOGRAPHY CHEST: CPT

## 2023-06-14 PROCEDURE — 80053 COMPREHEN METABOLIC PANEL: CPT | Performed by: EMERGENCY MEDICINE

## 2023-06-14 PROCEDURE — 93005 ELECTROCARDIOGRAM TRACING: CPT

## 2023-06-14 PROCEDURE — 63710000001 INSULIN LISPRO (HUMAN) PER 5 UNITS: Performed by: INTERNAL MEDICINE

## 2023-06-14 PROCEDURE — 85025 COMPLETE CBC W/AUTO DIFF WBC: CPT | Performed by: EMERGENCY MEDICINE

## 2023-06-14 PROCEDURE — 71045 X-RAY EXAM CHEST 1 VIEW: CPT

## 2023-06-14 RX ORDER — ONDANSETRON 4 MG/1
4 TABLET, FILM COATED ORAL EVERY 6 HOURS PRN
Status: DISCONTINUED | OUTPATIENT
Start: 2023-06-14 | End: 2023-06-16 | Stop reason: HOSPADM

## 2023-06-14 RX ORDER — POLYETHYLENE GLYCOL 3350 17 G/17G
17 POWDER, FOR SOLUTION ORAL DAILY PRN
Status: DISCONTINUED | OUTPATIENT
Start: 2023-06-14 | End: 2023-06-16 | Stop reason: HOSPADM

## 2023-06-14 RX ORDER — UREA 10 %
3 LOTION (ML) TOPICAL NIGHTLY PRN
Status: DISCONTINUED | OUTPATIENT
Start: 2023-06-14 | End: 2023-06-16 | Stop reason: HOSPADM

## 2023-06-14 RX ORDER — HEPARIN SODIUM 10000 [USP'U]/100ML
16.3 INJECTION, SOLUTION INTRAVENOUS
Status: DISCONTINUED | OUTPATIENT
Start: 2023-06-14 | End: 2023-06-15

## 2023-06-14 RX ORDER — HEPARIN SODIUM 5000 [USP'U]/ML
80 INJECTION, SOLUTION INTRAVENOUS; SUBCUTANEOUS ONCE
Status: DISCONTINUED | OUTPATIENT
Start: 2023-06-14 | End: 2023-06-14

## 2023-06-14 RX ORDER — HEPARIN SODIUM 5000 [USP'U]/ML
80 INJECTION, SOLUTION INTRAVENOUS; SUBCUTANEOUS ONCE
Status: COMPLETED | OUTPATIENT
Start: 2023-06-14 | End: 2023-06-14

## 2023-06-14 RX ORDER — ONDANSETRON 2 MG/ML
4 INJECTION INTRAMUSCULAR; INTRAVENOUS EVERY 6 HOURS PRN
Status: DISCONTINUED | OUTPATIENT
Start: 2023-06-14 | End: 2023-06-16 | Stop reason: HOSPADM

## 2023-06-14 RX ORDER — INSULIN LISPRO 100 [IU]/ML
2-9 INJECTION, SOLUTION INTRAVENOUS; SUBCUTANEOUS
Status: DISCONTINUED | OUTPATIENT
Start: 2023-06-14 | End: 2023-06-16 | Stop reason: HOSPADM

## 2023-06-14 RX ORDER — AMOXICILLIN 250 MG
2 CAPSULE ORAL 2 TIMES DAILY
Status: DISCONTINUED | OUTPATIENT
Start: 2023-06-14 | End: 2023-06-16 | Stop reason: HOSPADM

## 2023-06-14 RX ORDER — DEXTROSE MONOHYDRATE 25 G/50ML
25 INJECTION, SOLUTION INTRAVENOUS
Status: DISCONTINUED | OUTPATIENT
Start: 2023-06-14 | End: 2023-06-16 | Stop reason: HOSPADM

## 2023-06-14 RX ORDER — BISACODYL 10 MG
10 SUPPOSITORY, RECTAL RECTAL DAILY PRN
Status: DISCONTINUED | OUTPATIENT
Start: 2023-06-14 | End: 2023-06-16 | Stop reason: HOSPADM

## 2023-06-14 RX ORDER — NICOTINE POLACRILEX 4 MG
15 LOZENGE BUCCAL
Status: DISCONTINUED | OUTPATIENT
Start: 2023-06-14 | End: 2023-06-16 | Stop reason: HOSPADM

## 2023-06-14 RX ORDER — HEPARIN SODIUM 5000 [USP'U]/ML
40-80 INJECTION, SOLUTION INTRAVENOUS; SUBCUTANEOUS EVERY 6 HOURS PRN
Status: DISCONTINUED | OUTPATIENT
Start: 2023-06-14 | End: 2023-06-15

## 2023-06-14 RX ORDER — IBUPROFEN 600 MG/1
1 TABLET ORAL
Status: DISCONTINUED | OUTPATIENT
Start: 2023-06-14 | End: 2023-06-16 | Stop reason: HOSPADM

## 2023-06-14 RX ORDER — ACETAMINOPHEN 325 MG/1
650 TABLET ORAL EVERY 4 HOURS PRN
Status: DISCONTINUED | OUTPATIENT
Start: 2023-06-14 | End: 2023-06-16 | Stop reason: HOSPADM

## 2023-06-14 RX ORDER — BISACODYL 5 MG/1
5 TABLET, DELAYED RELEASE ORAL DAILY PRN
Status: DISCONTINUED | OUTPATIENT
Start: 2023-06-14 | End: 2023-06-16 | Stop reason: HOSPADM

## 2023-06-14 RX ADMIN — INSULIN LISPRO 2 UNITS: 100 INJECTION, SOLUTION INTRAVENOUS; SUBCUTANEOUS at 21:59

## 2023-06-14 RX ADMIN — IOPAMIDOL 95 ML: 755 INJECTION, SOLUTION INTRAVENOUS at 16:14

## 2023-06-14 RX ADMIN — DOCUSATE SODIUM 50MG AND SENNOSIDES 8.6MG 2 TABLET: 8.6; 5 TABLET, FILM COATED ORAL at 22:04

## 2023-06-14 RX ADMIN — HEPARIN SODIUM 16.3 UNITS/KG/HR: 10000 INJECTION, SOLUTION INTRAVENOUS at 17:33

## 2023-06-14 RX ADMIN — HEPARIN SODIUM 7400 UNITS: 5000 INJECTION INTRAVENOUS; SUBCUTANEOUS at 17:33

## 2023-06-15 ENCOUNTER — APPOINTMENT (OUTPATIENT)
Dept: CT IMAGING | Facility: HOSPITAL | Age: 73
End: 2023-06-15
Payer: MEDICARE

## 2023-06-15 ENCOUNTER — APPOINTMENT (OUTPATIENT)
Dept: CARDIOLOGY | Facility: HOSPITAL | Age: 73
End: 2023-06-15
Payer: MEDICARE

## 2023-06-15 LAB
ANION GAP SERPL CALCULATED.3IONS-SCNC: 9.4 MMOL/L (ref 5–15)
APTT PPP: 87.8 SECONDS (ref 22.7–35.4)
BASOPHILS # BLD AUTO: 0.03 10*3/MM3 (ref 0–0.2)
BASOPHILS NFR BLD AUTO: 0.5 % (ref 0–1.5)
BH CV LOW VAS LEFT LESSER SAPH VESSEL: 1
BH CV LOW VAS LEFT POSTERIOR TIBIAL VESSEL: 1
BH CV LOW VAS RIGHT LESSER SAPH VESSEL: 1
BH CV LOWER VASCULAR LEFT COMMON FEMORAL COMPETENT: NORMAL
BH CV LOWER VASCULAR LEFT COMMON FEMORAL COMPRESS: NORMAL
BH CV LOWER VASCULAR LEFT COMMON FEMORAL PHASIC: NORMAL
BH CV LOWER VASCULAR LEFT COMMON FEMORAL SPONT: NORMAL
BH CV LOWER VASCULAR LEFT DISTAL FEMORAL COMPRESS: NORMAL
BH CV LOWER VASCULAR LEFT GASTRONEMIUS COMPRESS: NORMAL
BH CV LOWER VASCULAR LEFT GREATER SAPH AK COMPRESS: NORMAL
BH CV LOWER VASCULAR LEFT GREATER SAPH BK COMPRESS: NORMAL
BH CV LOWER VASCULAR LEFT LESSER SAPH COMPRESS: NORMAL
BH CV LOWER VASCULAR LEFT LESSER SAPH THROMBUS: NORMAL
BH CV LOWER VASCULAR LEFT MID FEMORAL COMPETENT: NORMAL
BH CV LOWER VASCULAR LEFT MID FEMORAL COMPRESS: NORMAL
BH CV LOWER VASCULAR LEFT MID FEMORAL PHASIC: NORMAL
BH CV LOWER VASCULAR LEFT MID FEMORAL SPONT: NORMAL
BH CV LOWER VASCULAR LEFT MID FEMORAL THROMBUS: NORMAL
BH CV LOWER VASCULAR LEFT PERONEAL COMPRESS: NORMAL
BH CV LOWER VASCULAR LEFT POPLITEAL COMPETENT: NORMAL
BH CV LOWER VASCULAR LEFT POPLITEAL COMPRESS: NORMAL
BH CV LOWER VASCULAR LEFT POPLITEAL PHASIC: NORMAL
BH CV LOWER VASCULAR LEFT POPLITEAL SPONT: NORMAL
BH CV LOWER VASCULAR LEFT POSTERIOR TIBIAL COMPRESS: NORMAL
BH CV LOWER VASCULAR LEFT POSTERIOR TIBIAL THROMBUS: NORMAL
BH CV LOWER VASCULAR LEFT PROFUNDA FEMORAL COMPRESS: NORMAL
BH CV LOWER VASCULAR LEFT PROXIMAL FEMORAL COMPRESS: NORMAL
BH CV LOWER VASCULAR LEFT SAPHENOFEMORAL JUNCTION COMPRESS: NORMAL
BH CV LOWER VASCULAR RIGHT COMMON FEMORAL AUGMENT: NORMAL
BH CV LOWER VASCULAR RIGHT COMMON FEMORAL COMPETENT: NORMAL
BH CV LOWER VASCULAR RIGHT COMMON FEMORAL COMPRESS: NORMAL
BH CV LOWER VASCULAR RIGHT COMMON FEMORAL PHASIC: NORMAL
BH CV LOWER VASCULAR RIGHT COMMON FEMORAL SPONT: NORMAL
BH CV LOWER VASCULAR RIGHT DISTAL FEMORAL COMPRESS: NORMAL
BH CV LOWER VASCULAR RIGHT GASTRONEMIUS COMPRESS: NORMAL
BH CV LOWER VASCULAR RIGHT GREATER SAPH AK COMPRESS: NORMAL
BH CV LOWER VASCULAR RIGHT GREATER SAPH BK COMPRESS: NORMAL
BH CV LOWER VASCULAR RIGHT LESSER SAPH COMPRESS: NORMAL
BH CV LOWER VASCULAR RIGHT LESSER SAPH THROMBUS: NORMAL
BH CV LOWER VASCULAR RIGHT MID FEMORAL AUGMENT: NORMAL
BH CV LOWER VASCULAR RIGHT MID FEMORAL COMPETENT: NORMAL
BH CV LOWER VASCULAR RIGHT MID FEMORAL COMPRESS: NORMAL
BH CV LOWER VASCULAR RIGHT MID FEMORAL PHASIC: NORMAL
BH CV LOWER VASCULAR RIGHT MID FEMORAL SPONT: NORMAL
BH CV LOWER VASCULAR RIGHT PERONEAL COMPRESS: NORMAL
BH CV LOWER VASCULAR RIGHT POPLITEAL AUGMENT: NORMAL
BH CV LOWER VASCULAR RIGHT POPLITEAL COMPETENT: NORMAL
BH CV LOWER VASCULAR RIGHT POPLITEAL COMPRESS: NORMAL
BH CV LOWER VASCULAR RIGHT POPLITEAL PHASIC: NORMAL
BH CV LOWER VASCULAR RIGHT POPLITEAL SPONT: NORMAL
BH CV LOWER VASCULAR RIGHT POSTERIOR TIBIAL COMPRESS: NORMAL
BH CV LOWER VASCULAR RIGHT PROFUNDA FEMORAL COMPRESS: NORMAL
BH CV LOWER VASCULAR RIGHT PROXIMAL FEMORAL COMPRESS: NORMAL
BH CV LOWER VASCULAR RIGHT SAPHENOFEMORAL JUNCTION COMPRESS: NORMAL
BH CV VAS PRELIMINARY FINDINGS SCRIPTING: 1
BUN SERPL-MCNC: 18 MG/DL (ref 8–23)
BUN/CREAT SERPL: 19.1 (ref 7–25)
CALCIUM SPEC-SCNC: 9.8 MG/DL (ref 8.6–10.5)
CHLORIDE SERPL-SCNC: 102 MMOL/L (ref 98–107)
CO2 SERPL-SCNC: 26.6 MMOL/L (ref 22–29)
CREAT SERPL-MCNC: 0.94 MG/DL (ref 0.76–1.27)
DEPRECATED RDW RBC AUTO: 39 FL (ref 37–54)
EGFRCR SERPLBLD CKD-EPI 2021: 85.6 ML/MIN/1.73
EOSINOPHIL # BLD AUTO: 0.28 10*3/MM3 (ref 0–0.4)
EOSINOPHIL NFR BLD AUTO: 4.5 % (ref 0.3–6.2)
ERYTHROCYTE [DISTWIDTH] IN BLOOD BY AUTOMATED COUNT: 11.5 % (ref 12.3–15.4)
GLUCOSE BLDC GLUCOMTR-MCNC: 167 MG/DL (ref 70–130)
GLUCOSE BLDC GLUCOMTR-MCNC: 168 MG/DL (ref 70–130)
GLUCOSE BLDC GLUCOMTR-MCNC: 268 MG/DL (ref 70–130)
GLUCOSE BLDC GLUCOMTR-MCNC: 299 MG/DL (ref 70–130)
GLUCOSE SERPL-MCNC: 158 MG/DL (ref 65–99)
HCT VFR BLD AUTO: 39.2 % (ref 37.5–51)
HGB BLD-MCNC: 13.3 G/DL (ref 13–17.7)
IMM GRANULOCYTES # BLD AUTO: 0.03 10*3/MM3 (ref 0–0.05)
IMM GRANULOCYTES NFR BLD AUTO: 0.5 % (ref 0–0.5)
LYMPHOCYTES # BLD AUTO: 1.54 10*3/MM3 (ref 0.7–3.1)
LYMPHOCYTES NFR BLD AUTO: 24.5 % (ref 19.6–45.3)
MCH RBC QN AUTO: 31.6 PG (ref 26.6–33)
MCHC RBC AUTO-ENTMCNC: 33.9 G/DL (ref 31.5–35.7)
MCV RBC AUTO: 93.1 FL (ref 79–97)
MONOCYTES # BLD AUTO: 0.64 10*3/MM3 (ref 0.1–0.9)
MONOCYTES NFR BLD AUTO: 10.2 % (ref 5–12)
NEUTROPHILS NFR BLD AUTO: 3.77 10*3/MM3 (ref 1.7–7)
NEUTROPHILS NFR BLD AUTO: 59.8 % (ref 42.7–76)
NRBC BLD AUTO-RTO: 0 /100 WBC (ref 0–0.2)
PLATELET # BLD AUTO: 186 10*3/MM3 (ref 140–450)
PMV BLD AUTO: 10.4 FL (ref 6–12)
POTASSIUM SERPL-SCNC: 4.1 MMOL/L (ref 3.5–5.2)
RBC # BLD AUTO: 4.21 10*6/MM3 (ref 4.14–5.8)
SODIUM SERPL-SCNC: 138 MMOL/L (ref 136–145)
WBC NRBC COR # BLD: 6.29 10*3/MM3 (ref 3.4–10.8)

## 2023-06-15 PROCEDURE — 0 DIATRIZOATE MEGLUMINE & SODIUM PER 1 ML: Performed by: INTERNAL MEDICINE

## 2023-06-15 PROCEDURE — 82948 REAGENT STRIP/BLOOD GLUCOSE: CPT

## 2023-06-15 PROCEDURE — 36415 COLL VENOUS BLD VENIPUNCTURE: CPT | Performed by: EMERGENCY MEDICINE

## 2023-06-15 PROCEDURE — 85025 COMPLETE CBC W/AUTO DIFF WBC: CPT | Performed by: EMERGENCY MEDICINE

## 2023-06-15 PROCEDURE — 74177 CT ABD & PELVIS W/CONTRAST: CPT

## 2023-06-15 PROCEDURE — 25510000001 IOPAMIDOL 61 % SOLUTION: Performed by: HOSPITALIST

## 2023-06-15 PROCEDURE — 85730 THROMBOPLASTIN TIME PARTIAL: CPT | Performed by: EMERGENCY MEDICINE

## 2023-06-15 PROCEDURE — 80048 BASIC METABOLIC PNL TOTAL CA: CPT | Performed by: INTERNAL MEDICINE

## 2023-06-15 PROCEDURE — 25010000002 HEPARIN (PORCINE) 25000-0.45 UT/250ML-% SOLUTION: Performed by: EMERGENCY MEDICINE

## 2023-06-15 PROCEDURE — 63710000001 INSULIN LISPRO (HUMAN) PER 5 UNITS: Performed by: INTERNAL MEDICINE

## 2023-06-15 PROCEDURE — 93970 EXTREMITY STUDY: CPT

## 2023-06-15 RX ORDER — INSULIN LISPRO 100 [IU]/ML
2-9 INJECTION, SOLUTION INTRAVENOUS; SUBCUTANEOUS
Status: DISCONTINUED | OUTPATIENT
Start: 2023-06-15 | End: 2023-06-15 | Stop reason: SDUPTHER

## 2023-06-15 RX ORDER — ATORVASTATIN CALCIUM 20 MG/1
40 TABLET, FILM COATED ORAL NIGHTLY
Status: DISCONTINUED | OUTPATIENT
Start: 2023-06-15 | End: 2023-06-16 | Stop reason: HOSPADM

## 2023-06-15 RX ORDER — IBUPROFEN 600 MG/1
1 TABLET ORAL
Status: DISCONTINUED | OUTPATIENT
Start: 2023-06-15 | End: 2023-06-15 | Stop reason: SDUPTHER

## 2023-06-15 RX ORDER — HYDROCHLOROTHIAZIDE 12.5 MG/1
12.5 TABLET ORAL DAILY
Status: DISCONTINUED | OUTPATIENT
Start: 2023-06-15 | End: 2023-06-16 | Stop reason: HOSPADM

## 2023-06-15 RX ORDER — LOSARTAN POTASSIUM 100 MG/1
100 TABLET ORAL
Status: DISCONTINUED | OUTPATIENT
Start: 2023-06-15 | End: 2023-06-16 | Stop reason: HOSPADM

## 2023-06-15 RX ORDER — DEXTROSE MONOHYDRATE 25 G/50ML
25 INJECTION, SOLUTION INTRAVENOUS
Status: DISCONTINUED | OUTPATIENT
Start: 2023-06-15 | End: 2023-06-15 | Stop reason: SDUPTHER

## 2023-06-15 RX ORDER — NICOTINE POLACRILEX 4 MG
15 LOZENGE BUCCAL
Status: DISCONTINUED | OUTPATIENT
Start: 2023-06-15 | End: 2023-06-15 | Stop reason: SDUPTHER

## 2023-06-15 RX ADMIN — INSULIN LISPRO 6 UNITS: 100 INJECTION, SOLUTION INTRAVENOUS; SUBCUTANEOUS at 21:26

## 2023-06-15 RX ADMIN — INSULIN LISPRO 2 UNITS: 100 INJECTION, SOLUTION INTRAVENOUS; SUBCUTANEOUS at 08:20

## 2023-06-15 RX ADMIN — DOCUSATE SODIUM 50MG AND SENNOSIDES 8.6MG 2 TABLET: 8.6; 5 TABLET, FILM COATED ORAL at 08:20

## 2023-06-15 RX ADMIN — DIATRIZOATE MEGLUMINE AND DIATRIZOATE SODIUM 30 ML: 660; 100 LIQUID ORAL; RECTAL at 12:43

## 2023-06-15 RX ADMIN — IOPAMIDOL 85 ML: 612 INJECTION, SOLUTION INTRAVENOUS at 15:14

## 2023-06-15 RX ADMIN — LOSARTAN POTASSIUM 100 MG: 100 TABLET, FILM COATED ORAL at 12:56

## 2023-06-15 RX ADMIN — HEPARIN SODIUM 16.3 UNITS/KG/HR: 10000 INJECTION, SOLUTION INTRAVENOUS at 07:51

## 2023-06-15 RX ADMIN — DOCUSATE SODIUM 50MG AND SENNOSIDES 8.6MG 2 TABLET: 8.6; 5 TABLET, FILM COATED ORAL at 21:19

## 2023-06-15 RX ADMIN — ATORVASTATIN CALCIUM 40 MG: 20 TABLET, FILM COATED ORAL at 21:19

## 2023-06-15 RX ADMIN — INSULIN LISPRO 6 UNITS: 100 INJECTION, SOLUTION INTRAVENOUS; SUBCUTANEOUS at 12:57

## 2023-06-15 RX ADMIN — INSULIN GLARGINE-YFGN 25 UNITS: 100 INJECTION, SOLUTION SUBCUTANEOUS at 12:56

## 2023-06-15 RX ADMIN — RIVAROXABAN 15 MG: 15 TABLET, FILM COATED ORAL at 17:57

## 2023-06-15 RX ADMIN — HYDROCHLOROTHIAZIDE 12.5 MG: 12.5 TABLET ORAL at 17:56

## 2023-06-15 RX ADMIN — INSULIN LISPRO 2 UNITS: 100 INJECTION, SOLUTION INTRAVENOUS; SUBCUTANEOUS at 17:56

## 2023-06-16 ENCOUNTER — READMISSION MANAGEMENT (OUTPATIENT)
Dept: CALL CENTER | Facility: HOSPITAL | Age: 73
End: 2023-06-16
Payer: MEDICARE

## 2023-06-16 VITALS
HEIGHT: 73 IN | WEIGHT: 200.3 LBS | RESPIRATION RATE: 16 BRPM | SYSTOLIC BLOOD PRESSURE: 127 MMHG | BODY MASS INDEX: 26.54 KG/M2 | TEMPERATURE: 97.7 F | DIASTOLIC BLOOD PRESSURE: 79 MMHG | OXYGEN SATURATION: 91 % | HEART RATE: 75 BPM

## 2023-06-16 DIAGNOSIS — I26.09 OTHER ACUTE PULMONARY EMBOLISM WITH ACUTE COR PULMONALE: Primary | ICD-10-CM

## 2023-06-16 DIAGNOSIS — Z86.711 PERSONAL HISTORY OF PULMONARY EMBOLISM: ICD-10-CM

## 2023-06-16 LAB
GLUCOSE BLDC GLUCOMTR-MCNC: 177 MG/DL (ref 70–130)
GLUCOSE BLDC GLUCOMTR-MCNC: 310 MG/DL (ref 70–130)

## 2023-06-16 PROCEDURE — 82948 REAGENT STRIP/BLOOD GLUCOSE: CPT

## 2023-06-16 PROCEDURE — 63710000001 INSULIN LISPRO (HUMAN) PER 5 UNITS: Performed by: INTERNAL MEDICINE

## 2023-06-16 RX ADMIN — RIVAROXABAN 15 MG: 15 TABLET, FILM COATED ORAL at 08:24

## 2023-06-16 RX ADMIN — HYDROCHLOROTHIAZIDE 12.5 MG: 12.5 TABLET ORAL at 08:24

## 2023-06-16 RX ADMIN — INSULIN LISPRO 2 UNITS: 100 INJECTION, SOLUTION INTRAVENOUS; SUBCUTANEOUS at 08:23

## 2023-06-16 RX ADMIN — DOCUSATE SODIUM 50MG AND SENNOSIDES 8.6MG 2 TABLET: 8.6; 5 TABLET, FILM COATED ORAL at 08:24

## 2023-06-16 RX ADMIN — INSULIN LISPRO 7 UNITS: 100 INJECTION, SOLUTION INTRAVENOUS; SUBCUTANEOUS at 13:03

## 2023-06-16 RX ADMIN — LOSARTAN POTASSIUM 100 MG: 100 TABLET, FILM COATED ORAL at 08:24

## 2023-06-16 RX ADMIN — INSULIN GLARGINE-YFGN 25 UNITS: 100 INJECTION, SOLUTION SUBCUTANEOUS at 08:25

## 2023-06-19 ENCOUNTER — TRANSITIONAL CARE MANAGEMENT TELEPHONE ENCOUNTER (OUTPATIENT)
Dept: CALL CENTER | Facility: HOSPITAL | Age: 73
End: 2023-06-19
Payer: MEDICARE

## 2023-06-27 PROBLEM — R79.89 ELEVATED TESTOSTERONE LEVEL IN MALE: Status: ACTIVE | Noted: 2023-06-27

## 2023-06-27 PROBLEM — J40 BRONCHITIS: Status: RESOLVED | Noted: 2022-11-07 | Resolved: 2023-06-27

## 2023-08-01 ENCOUNTER — TELEPHONE (OUTPATIENT)
Dept: INTERNAL MEDICINE | Facility: CLINIC | Age: 73
End: 2023-08-01
Payer: MEDICARE

## 2023-08-04 ENCOUNTER — HOSPITAL ENCOUNTER (OUTPATIENT)
Dept: MRI IMAGING | Facility: HOSPITAL | Age: 73
Discharge: HOME | End: 2023-08-04
Admitting: STUDENT IN AN ORGANIZED HEALTH CARE EDUCATION/TRAINING PROGRAM
Payer: MEDICARE

## 2023-08-04 DIAGNOSIS — R41.9 COGNITIVE COMPLAINTS: ICD-10-CM

## 2023-08-04 PROCEDURE — 70551 MRI BRAIN STEM W/O DYE: CPT

## 2023-08-10 RX ORDER — SENNOSIDES 8.6 MG
650 CAPSULE ORAL EVERY 8 HOURS PRN
COMMUNITY

## 2023-08-11 ENCOUNTER — HOSPITAL ENCOUNTER (OUTPATIENT)
Facility: HOSPITAL | Age: 73
Setting detail: HOSPITAL OUTPATIENT SURGERY
Discharge: HOME | End: 2023-08-11
Attending: SURGERY | Admitting: SURGERY
Payer: MEDICARE

## 2023-08-11 ENCOUNTER — ANESTHESIA (OUTPATIENT)
Dept: GASTROENTEROLOGY | Facility: HOSPITAL | Age: 73
End: 2023-08-11
Payer: MEDICARE

## 2023-08-11 ENCOUNTER — ANESTHESIA EVENT (OUTPATIENT)
Dept: GASTROENTEROLOGY | Facility: HOSPITAL | Age: 73
End: 2023-08-11
Payer: MEDICARE

## 2023-08-11 VITALS
DIASTOLIC BLOOD PRESSURE: 64 MMHG | RESPIRATION RATE: 16 BRPM | SYSTOLIC BLOOD PRESSURE: 119 MMHG | BODY MASS INDEX: 26.8 KG/M2 | HEIGHT: 73 IN | HEART RATE: 68 BPM | WEIGHT: 202.2 LBS | OXYGEN SATURATION: 95 %

## 2023-08-11 LAB — GLUCOSE BLDC GLUCOMTR-MCNC: 151 MG/DL (ref 70–130)

## 2023-08-11 PROCEDURE — S0260 H&P FOR SURGERY: HCPCS | Performed by: SURGERY

## 2023-08-11 PROCEDURE — 82948 REAGENT STRIP/BLOOD GLUCOSE: CPT

## 2023-08-11 PROCEDURE — 25010000002 PROPOFOL 10 MG/ML EMULSION: Performed by: NURSE ANESTHETIST, CERTIFIED REGISTERED

## 2023-08-11 PROCEDURE — G0121 COLON CA SCRN NOT HI RSK IND: HCPCS | Performed by: SURGERY

## 2023-08-11 RX ORDER — LIDOCAINE HYDROCHLORIDE 20 MG/ML
INJECTION, SOLUTION INFILTRATION; PERINEURAL AS NEEDED
Status: DISCONTINUED | OUTPATIENT
Start: 2023-08-11 | End: 2023-08-11 | Stop reason: SURG

## 2023-08-11 RX ORDER — PROPOFOL 10 MG/ML
VIAL (ML) INTRAVENOUS AS NEEDED
Status: DISCONTINUED | OUTPATIENT
Start: 2023-08-11 | End: 2023-08-11 | Stop reason: SURG

## 2023-08-11 RX ORDER — PROPOFOL 10 MG/ML
VIAL (ML) INTRAVENOUS CONTINUOUS PRN
Status: DISCONTINUED | OUTPATIENT
Start: 2023-08-11 | End: 2023-08-11 | Stop reason: SURG

## 2023-08-11 RX ORDER — SODIUM CHLORIDE, SODIUM LACTATE, POTASSIUM CHLORIDE, CALCIUM CHLORIDE 600; 310; 30; 20 MG/100ML; MG/100ML; MG/100ML; MG/100ML
30 INJECTION, SOLUTION INTRAVENOUS CONTINUOUS PRN
Status: DISCONTINUED | OUTPATIENT
Start: 2023-08-11 | End: 2023-08-11 | Stop reason: HOSPADM

## 2023-08-11 RX ADMIN — Medication 100 MG: at 10:48

## 2023-08-11 RX ADMIN — LIDOCAINE HYDROCHLORIDE 60 MG: 20 INJECTION, SOLUTION INFILTRATION; PERINEURAL at 10:48

## 2023-08-11 RX ADMIN — PROPOFOL 180 MCG/KG/MIN: 10 INJECTION, EMULSION INTRAVENOUS at 10:48

## 2023-08-11 RX ADMIN — SODIUM CHLORIDE, POTASSIUM CHLORIDE, SODIUM LACTATE AND CALCIUM CHLORIDE 30 ML/HR: 600; 310; 30; 20 INJECTION, SOLUTION INTRAVENOUS at 09:34

## 2023-11-15 DIAGNOSIS — K21.9 GASTROESOPHAGEAL REFLUX DISEASE WITHOUT ESOPHAGITIS: ICD-10-CM

## 2023-11-15 DIAGNOSIS — I10 ESSENTIAL HYPERTENSION: ICD-10-CM

## 2023-11-15 DIAGNOSIS — E78.2 MIXED HYPERLIPIDEMIA: ICD-10-CM

## 2023-11-15 RX ORDER — IRBESARTAN 300 MG/1
300 TABLET ORAL DAILY
Qty: 90 TABLET | Refills: 0 | Status: SHIPPED | OUTPATIENT
Start: 2023-11-15

## 2023-11-15 RX ORDER — ATORVASTATIN CALCIUM 40 MG/1
40 TABLET, FILM COATED ORAL DAILY
Qty: 90 TABLET | Refills: 0 | Status: SHIPPED | OUTPATIENT
Start: 2023-11-15

## 2023-11-15 RX ORDER — FAMOTIDINE 40 MG/1
TABLET, FILM COATED ORAL
Qty: 90 TABLET | Refills: 0 | OUTPATIENT
Start: 2023-11-15

## 2023-11-28 LAB — HBA1C MFR BLD: 8 %

## 2023-12-04 ENCOUNTER — HOSPITAL ENCOUNTER (OUTPATIENT)
Dept: CARDIOLOGY | Facility: HOSPITAL | Age: 73
Discharge: HOME | End: 2023-12-04
Admitting: INTERNAL MEDICINE
Payer: MEDICARE

## 2023-12-04 DIAGNOSIS — I26.09 OTHER ACUTE PULMONARY EMBOLISM WITH ACUTE COR PULMONALE: ICD-10-CM

## 2023-12-04 DIAGNOSIS — Z86.711 PERSONAL HISTORY OF PULMONARY EMBOLISM: ICD-10-CM

## 2023-12-04 LAB
BH CV LOW VAS LEFT LESSER SAPH VESSEL: 1
BH CV LOW VAS LEFT MID FEMORAL SPONT: 1
BH CV LOW VAS RIGHT LESSER SAPH VESSEL: 1
BH CV LOWER VASCULAR LEFT COMMON FEMORAL AUGMENT: NORMAL
BH CV LOWER VASCULAR LEFT COMMON FEMORAL COMPETENT: NORMAL
BH CV LOWER VASCULAR LEFT COMMON FEMORAL COMPRESS: NORMAL
BH CV LOWER VASCULAR LEFT COMMON FEMORAL PHASIC: NORMAL
BH CV LOWER VASCULAR LEFT COMMON FEMORAL SPONT: NORMAL
BH CV LOWER VASCULAR LEFT DISTAL FEMORAL COMPRESS: NORMAL
BH CV LOWER VASCULAR LEFT GASTRONEMIUS COMPRESS: NORMAL
BH CV LOWER VASCULAR LEFT GREATER SAPH AK COMPRESS: NORMAL
BH CV LOWER VASCULAR LEFT GREATER SAPH BK COMPRESS: NORMAL
BH CV LOWER VASCULAR LEFT LESSER SAPH COMPRESS: NORMAL
BH CV LOWER VASCULAR LEFT LESSER SAPH THROMBUS: NORMAL
BH CV LOWER VASCULAR LEFT MID FEMORAL AUGMENT: NORMAL
BH CV LOWER VASCULAR LEFT MID FEMORAL COMPETENT: NORMAL
BH CV LOWER VASCULAR LEFT MID FEMORAL COMPRESS: NORMAL
BH CV LOWER VASCULAR LEFT MID FEMORAL PHASIC: NORMAL
BH CV LOWER VASCULAR LEFT MID FEMORAL SPONT: NORMAL
BH CV LOWER VASCULAR LEFT MID FEMORAL THROMBUS: NORMAL
BH CV LOWER VASCULAR LEFT PERONEAL COMPRESS: NORMAL
BH CV LOWER VASCULAR LEFT POPLITEAL AUGMENT: NORMAL
BH CV LOWER VASCULAR LEFT POPLITEAL COMPETENT: NORMAL
BH CV LOWER VASCULAR LEFT POPLITEAL COMPRESS: NORMAL
BH CV LOWER VASCULAR LEFT POPLITEAL PHASIC: NORMAL
BH CV LOWER VASCULAR LEFT POPLITEAL SPONT: NORMAL
BH CV LOWER VASCULAR LEFT POSTERIOR TIBIAL COMPRESS: NORMAL
BH CV LOWER VASCULAR LEFT PROFUNDA FEMORAL COMPRESS: NORMAL
BH CV LOWER VASCULAR LEFT PROXIMAL FEMORAL COMPRESS: NORMAL
BH CV LOWER VASCULAR LEFT SAPHENOFEMORAL JUNCTION COMPRESS: NORMAL
BH CV LOWER VASCULAR RIGHT COMMON FEMORAL AUGMENT: NORMAL
BH CV LOWER VASCULAR RIGHT COMMON FEMORAL COMPETENT: NORMAL
BH CV LOWER VASCULAR RIGHT COMMON FEMORAL COMPRESS: NORMAL
BH CV LOWER VASCULAR RIGHT COMMON FEMORAL PHASIC: NORMAL
BH CV LOWER VASCULAR RIGHT COMMON FEMORAL SPONT: NORMAL
BH CV LOWER VASCULAR RIGHT DISTAL FEMORAL COMPRESS: NORMAL
BH CV LOWER VASCULAR RIGHT GASTRONEMIUS COMPRESS: NORMAL
BH CV LOWER VASCULAR RIGHT GREATER SAPH AK COMPRESS: NORMAL
BH CV LOWER VASCULAR RIGHT GREATER SAPH BK COMPRESS: NORMAL
BH CV LOWER VASCULAR RIGHT LESSER SAPH COMPRESS: NORMAL
BH CV LOWER VASCULAR RIGHT LESSER SAPH THROMBUS: NORMAL
BH CV LOWER VASCULAR RIGHT MID FEMORAL AUGMENT: NORMAL
BH CV LOWER VASCULAR RIGHT MID FEMORAL COMPETENT: NORMAL
BH CV LOWER VASCULAR RIGHT MID FEMORAL COMPRESS: NORMAL
BH CV LOWER VASCULAR RIGHT MID FEMORAL PHASIC: NORMAL
BH CV LOWER VASCULAR RIGHT MID FEMORAL SPONT: NORMAL
BH CV LOWER VASCULAR RIGHT PERONEAL COMPRESS: NORMAL
BH CV LOWER VASCULAR RIGHT POPLITEAL AUGMENT: NORMAL
BH CV LOWER VASCULAR RIGHT POPLITEAL COMPETENT: NORMAL
BH CV LOWER VASCULAR RIGHT POPLITEAL COMPRESS: NORMAL
BH CV LOWER VASCULAR RIGHT POPLITEAL PHASIC: NORMAL
BH CV LOWER VASCULAR RIGHT POPLITEAL SPONT: NORMAL
BH CV LOWER VASCULAR RIGHT POSTERIOR TIBIAL COMPRESS: NORMAL
BH CV LOWER VASCULAR RIGHT PROFUNDA FEMORAL COMPRESS: NORMAL
BH CV LOWER VASCULAR RIGHT PROXIMAL FEMORAL COMPRESS: NORMAL
BH CV LOWER VASCULAR RIGHT SAPHENOFEMORAL JUNCTION COMPRESS: NORMAL

## 2023-12-04 PROCEDURE — 93970 EXTREMITY STUDY: CPT

## 2023-12-11 ENCOUNTER — LAB (OUTPATIENT)
Dept: LAB | Facility: HOSPITAL | Age: 73
End: 2023-12-11
Payer: MEDICARE

## 2023-12-11 ENCOUNTER — TELEPHONE (OUTPATIENT)
Dept: INTERNAL MEDICINE | Facility: CLINIC | Age: 73
End: 2023-12-11
Payer: MEDICARE

## 2023-12-11 ENCOUNTER — OFFICE VISIT (OUTPATIENT)
Dept: ONCOLOGY | Facility: CLINIC | Age: 73
End: 2023-12-11
Payer: MEDICARE

## 2023-12-11 VITALS
TEMPERATURE: 98.2 F | DIASTOLIC BLOOD PRESSURE: 68 MMHG | SYSTOLIC BLOOD PRESSURE: 144 MMHG | HEART RATE: 73 BPM | RESPIRATION RATE: 16 BRPM | HEIGHT: 73 IN | WEIGHT: 207.3 LBS | OXYGEN SATURATION: 95 % | BODY MASS INDEX: 27.47 KG/M2

## 2023-12-11 DIAGNOSIS — I26.09 OTHER ACUTE PULMONARY EMBOLISM WITH ACUTE COR PULMONALE: ICD-10-CM

## 2023-12-11 DIAGNOSIS — I26.09 OTHER ACUTE PULMONARY EMBOLISM WITH ACUTE COR PULMONALE: Primary | ICD-10-CM

## 2023-12-11 LAB
ALBUMIN SERPL-MCNC: 4.3 G/DL (ref 3.5–5.2)
ALBUMIN/GLOB SERPL: 1.4 G/DL
ALP SERPL-CCNC: 55 U/L (ref 39–117)
ALT SERPL W P-5'-P-CCNC: 19 U/L (ref 1–41)
ANION GAP SERPL CALCULATED.3IONS-SCNC: 9.5 MMOL/L (ref 5–15)
AST SERPL-CCNC: 18 U/L (ref 1–40)
BASOPHILS # BLD AUTO: 0.05 10*3/MM3 (ref 0–0.2)
BASOPHILS NFR BLD AUTO: 0.9 % (ref 0–1.5)
BILIRUB SERPL-MCNC: 0.5 MG/DL (ref 0–1.2)
BUN SERPL-MCNC: 24 MG/DL (ref 8–23)
BUN/CREAT SERPL: 20.9 (ref 7–25)
CALCIUM SPEC-SCNC: 9.6 MG/DL (ref 8.6–10.5)
CHLORIDE SERPL-SCNC: 99 MMOL/L (ref 98–107)
CO2 SERPL-SCNC: 30.5 MMOL/L (ref 22–29)
CREAT SERPL-MCNC: 1.15 MG/DL (ref 0.76–1.27)
DEPRECATED RDW RBC AUTO: 44.1 FL (ref 37–54)
EGFRCR SERPLBLD CKD-EPI 2021: 66.8 ML/MIN/1.73
EOSINOPHIL # BLD AUTO: 0.11 10*3/MM3 (ref 0–0.4)
EOSINOPHIL NFR BLD AUTO: 1.9 % (ref 0.3–6.2)
ERYTHROCYTE [DISTWIDTH] IN BLOOD BY AUTOMATED COUNT: 13.1 % (ref 12.3–15.4)
GLOBULIN UR ELPH-MCNC: 3.1 GM/DL
GLUCOSE SERPL-MCNC: 211 MG/DL (ref 65–99)
HCT VFR BLD AUTO: 38.6 % (ref 37.5–51)
HGB BLD-MCNC: 12.6 G/DL (ref 13–17.7)
IMM GRANULOCYTES # BLD AUTO: 0.01 10*3/MM3 (ref 0–0.05)
IMM GRANULOCYTES NFR BLD AUTO: 0.2 % (ref 0–0.5)
LYMPHOCYTES # BLD AUTO: 1.5 10*3/MM3 (ref 0.7–3.1)
LYMPHOCYTES NFR BLD AUTO: 26.2 % (ref 19.6–45.3)
MCH RBC QN AUTO: 30.1 PG (ref 26.6–33)
MCHC RBC AUTO-ENTMCNC: 32.6 G/DL (ref 31.5–35.7)
MCV RBC AUTO: 92.3 FL (ref 79–97)
MONOCYTES # BLD AUTO: 0.48 10*3/MM3 (ref 0.1–0.9)
MONOCYTES NFR BLD AUTO: 8.4 % (ref 5–12)
NEUTROPHILS NFR BLD AUTO: 3.57 10*3/MM3 (ref 1.7–7)
NEUTROPHILS NFR BLD AUTO: 62.4 % (ref 42.7–76)
NRBC BLD AUTO-RTO: 0 /100 WBC (ref 0–0.2)
PLATELET # BLD AUTO: 212 10*3/MM3 (ref 140–450)
PMV BLD AUTO: 9.9 FL (ref 6–12)
POTASSIUM SERPL-SCNC: 4.7 MMOL/L (ref 3.5–5.2)
PROT SERPL-MCNC: 7.4 G/DL (ref 6–8.5)
RBC # BLD AUTO: 4.18 10*6/MM3 (ref 4.14–5.8)
SODIUM SERPL-SCNC: 139 MMOL/L (ref 136–145)
WBC NRBC COR # BLD AUTO: 5.72 10*3/MM3 (ref 3.4–10.8)

## 2023-12-11 PROCEDURE — 1126F AMNT PAIN NOTED NONE PRSNT: CPT | Performed by: INTERNAL MEDICINE

## 2023-12-11 PROCEDURE — 3077F SYST BP >= 140 MM HG: CPT | Performed by: INTERNAL MEDICINE

## 2023-12-11 PROCEDURE — 99213 OFFICE O/P EST LOW 20 MIN: CPT | Performed by: INTERNAL MEDICINE

## 2023-12-11 PROCEDURE — 85025 COMPLETE CBC W/AUTO DIFF WBC: CPT

## 2023-12-11 PROCEDURE — 3078F DIAST BP <80 MM HG: CPT | Performed by: INTERNAL MEDICINE

## 2023-12-11 PROCEDURE — 80053 COMPREHEN METABOLIC PANEL: CPT

## 2023-12-11 PROCEDURE — 36415 COLL VENOUS BLD VENIPUNCTURE: CPT

## 2023-12-13 ENCOUNTER — TELEPHONE (OUTPATIENT)
Dept: INTERNAL MEDICINE | Facility: CLINIC | Age: 73
End: 2023-12-13
Payer: MEDICARE

## 2023-12-13 RX ORDER — MELOXICAM 7.5 MG/1
15 TABLET ORAL DAILY
Status: CANCELLED | OUTPATIENT
Start: 2023-12-13

## 2023-12-19 ENCOUNTER — OFFICE VISIT (OUTPATIENT)
Dept: INTERNAL MEDICINE | Facility: CLINIC | Age: 73
End: 2023-12-19
Payer: MEDICARE

## 2023-12-19 VITALS
DIASTOLIC BLOOD PRESSURE: 72 MMHG | WEIGHT: 207.4 LBS | OXYGEN SATURATION: 98 % | TEMPERATURE: 97.4 F | HEART RATE: 64 BPM | HEIGHT: 73 IN | BODY MASS INDEX: 27.49 KG/M2 | SYSTOLIC BLOOD PRESSURE: 140 MMHG

## 2023-12-19 DIAGNOSIS — I10 ESSENTIAL HYPERTENSION: Primary | ICD-10-CM

## 2023-12-19 DIAGNOSIS — M19.90 ARTHRITIS: ICD-10-CM

## 2023-12-19 DIAGNOSIS — F41.9 ANXIETY: ICD-10-CM

## 2023-12-19 LAB
ALBUMIN UR-MCNC: 0.2 MG/L
CREAT UR-MCNC: 55 MG/DL
MICROALBUMIN/CREAT UR: 4 MG/G (ref 0–29)

## 2023-12-19 RX ORDER — MELOXICAM 15 MG/1
15 TABLET ORAL DAILY
Qty: 90 TABLET | Refills: 1 | Status: SHIPPED | OUTPATIENT
Start: 2023-12-19

## 2023-12-19 RX ORDER — INSULIN LISPRO 100 [IU]/ML
INJECTION, SOLUTION INTRAVENOUS; SUBCUTANEOUS
COMMUNITY

## 2024-01-03 ENCOUNTER — OFFICE VISIT (OUTPATIENT)
Dept: NEUROLOGY | Facility: CLINIC | Age: 74
End: 2024-01-03
Payer: MEDICARE

## 2024-01-03 ENCOUNTER — LAB (OUTPATIENT)
Dept: LAB | Facility: HOSPITAL | Age: 74
End: 2024-01-03
Payer: MEDICARE

## 2024-01-03 VITALS
WEIGHT: 206 LBS | HEIGHT: 73 IN | SYSTOLIC BLOOD PRESSURE: 118 MMHG | HEART RATE: 76 BPM | DIASTOLIC BLOOD PRESSURE: 68 MMHG | OXYGEN SATURATION: 96 % | BODY MASS INDEX: 27.3 KG/M2

## 2024-01-03 DIAGNOSIS — R41.9 COGNITIVE COMPLAINTS: Primary | ICD-10-CM

## 2024-01-03 LAB — VIT B12 BLD-MCNC: 475 PG/ML (ref 211–946)

## 2024-01-03 PROCEDURE — 3078F DIAST BP <80 MM HG: CPT | Performed by: STUDENT IN AN ORGANIZED HEALTH CARE EDUCATION/TRAINING PROGRAM

## 2024-01-03 PROCEDURE — 99213 OFFICE O/P EST LOW 20 MIN: CPT | Performed by: STUDENT IN AN ORGANIZED HEALTH CARE EDUCATION/TRAINING PROGRAM

## 2024-01-03 PROCEDURE — 1160F RVW MEDS BY RX/DR IN RCRD: CPT | Performed by: STUDENT IN AN ORGANIZED HEALTH CARE EDUCATION/TRAINING PROGRAM

## 2024-01-03 PROCEDURE — 1159F MED LIST DOCD IN RCRD: CPT | Performed by: STUDENT IN AN ORGANIZED HEALTH CARE EDUCATION/TRAINING PROGRAM

## 2024-01-03 PROCEDURE — 82607 VITAMIN B-12: CPT | Performed by: STUDENT IN AN ORGANIZED HEALTH CARE EDUCATION/TRAINING PROGRAM

## 2024-01-03 PROCEDURE — 3074F SYST BP LT 130 MM HG: CPT | Performed by: STUDENT IN AN ORGANIZED HEALTH CARE EDUCATION/TRAINING PROGRAM

## 2024-01-03 PROCEDURE — 36415 COLL VENOUS BLD VENIPUNCTURE: CPT | Performed by: STUDENT IN AN ORGANIZED HEALTH CARE EDUCATION/TRAINING PROGRAM

## 2024-01-08 ENCOUNTER — TELEPHONE (OUTPATIENT)
Dept: NEUROLOGY | Facility: CLINIC | Age: 74
End: 2024-01-08
Payer: MEDICARE

## 2024-03-05 DIAGNOSIS — I10 ESSENTIAL HYPERTENSION: ICD-10-CM

## 2024-03-06 RX ORDER — IRBESARTAN 300 MG/1
300 TABLET ORAL DAILY
Qty: 90 TABLET | Refills: 0 | Status: SHIPPED | OUTPATIENT
Start: 2024-03-06

## 2024-03-11 ENCOUNTER — OFFICE VISIT (OUTPATIENT)
Dept: INTERNAL MEDICINE | Facility: CLINIC | Age: 74
End: 2024-03-11
Payer: MEDICARE

## 2024-03-11 VITALS
TEMPERATURE: 97.9 F | HEIGHT: 73 IN | OXYGEN SATURATION: 96 % | WEIGHT: 209.3 LBS | BODY MASS INDEX: 27.74 KG/M2 | SYSTOLIC BLOOD PRESSURE: 126 MMHG | DIASTOLIC BLOOD PRESSURE: 76 MMHG | HEART RATE: 71 BPM

## 2024-03-11 DIAGNOSIS — M54.50 BILATERAL LOW BACK PAIN, UNSPECIFIED CHRONICITY, UNSPECIFIED WHETHER SCIATICA PRESENT: Primary | ICD-10-CM

## 2024-03-11 DIAGNOSIS — N52.1 ERECTILE DYSFUNCTION DUE TO DISEASES CLASSIFIED ELSEWHERE: ICD-10-CM

## 2024-03-11 LAB
BILIRUB BLD-MCNC: NEGATIVE MG/DL
CLARITY, POC: CLEAR
COLOR UR: YELLOW
EXPIRATION DATE: NORMAL
GLUCOSE UR STRIP-MCNC: NEGATIVE MG/DL
KETONES UR QL: NEGATIVE
LEUKOCYTE EST, POC: NEGATIVE
Lab: NORMAL
NITRITE UR-MCNC: NEGATIVE MG/ML
PH UR: 6 [PH] (ref 5–8)
PROT UR STRIP-MCNC: NEGATIVE MG/DL
RBC # UR STRIP: NEGATIVE /UL
SP GR UR: 1.01 (ref 1–1.03)
UROBILINOGEN UR QL: NORMAL

## 2024-03-12 LAB
BASOPHILS # BLD AUTO: 0 X10E3/UL (ref 0–0.2)
BASOPHILS NFR BLD AUTO: 1 %
BUN SERPL-MCNC: 24 MG/DL (ref 8–27)
BUN/CREAT SERPL: 21 (ref 10–24)
CALCIUM SERPL-MCNC: 9.5 MG/DL (ref 8.6–10.2)
CHLORIDE SERPL-SCNC: 103 MMOL/L (ref 96–106)
CO2 SERPL-SCNC: 26 MMOL/L (ref 20–29)
CREAT SERPL-MCNC: 1.17 MG/DL (ref 0.76–1.27)
EGFRCR SERPLBLD CKD-EPI 2021: 65 ML/MIN/1.73
EOSINOPHIL # BLD AUTO: 0.3 X10E3/UL (ref 0–0.4)
EOSINOPHIL NFR BLD AUTO: 6 %
ERYTHROCYTE [DISTWIDTH] IN BLOOD BY AUTOMATED COUNT: 13.7 % (ref 11.6–15.4)
GLUCOSE SERPL-MCNC: 147 MG/DL (ref 70–99)
HCT VFR BLD AUTO: 34.4 % (ref 37.5–51)
HGB BLD-MCNC: 11.3 G/DL (ref 13–17.7)
IMM GRANULOCYTES # BLD AUTO: 0 X10E3/UL (ref 0–0.1)
IMM GRANULOCYTES NFR BLD AUTO: 0 %
LYMPHOCYTES # BLD AUTO: 1.2 X10E3/UL (ref 0.7–3.1)
LYMPHOCYTES NFR BLD AUTO: 26 %
MCH RBC QN AUTO: 30.2 PG (ref 26.6–33)
MCHC RBC AUTO-ENTMCNC: 32.8 G/DL (ref 31.5–35.7)
MCV RBC AUTO: 92 FL (ref 79–97)
MONOCYTES # BLD AUTO: 0.3 X10E3/UL (ref 0.1–0.9)
MONOCYTES NFR BLD AUTO: 7 %
NEUTROPHILS # BLD AUTO: 2.7 X10E3/UL (ref 1.4–7)
NEUTROPHILS NFR BLD AUTO: 60 %
PLATELET # BLD AUTO: 222 X10E3/UL (ref 150–450)
POTASSIUM SERPL-SCNC: 4.8 MMOL/L (ref 3.5–5.2)
RBC # BLD AUTO: 3.74 X10E6/UL (ref 4.14–5.8)
SODIUM SERPL-SCNC: 141 MMOL/L (ref 134–144)
WBC # BLD AUTO: 4.5 X10E3/UL (ref 3.4–10.8)

## 2024-03-18 DIAGNOSIS — D64.9 ANEMIA, UNSPECIFIED TYPE: Primary | ICD-10-CM

## 2024-04-03 ENCOUNTER — TELEPHONE (OUTPATIENT)
Dept: INTERNAL MEDICINE | Facility: CLINIC | Age: 74
End: 2024-04-03
Payer: MEDICARE

## 2024-04-09 ENCOUNTER — TELEPHONE (OUTPATIENT)
Dept: INTERNAL MEDICINE | Facility: CLINIC | Age: 74
End: 2024-04-09

## 2024-05-28 ENCOUNTER — OFFICE VISIT (OUTPATIENT)
Dept: INTERNAL MEDICINE | Facility: CLINIC | Age: 74
End: 2024-05-28
Payer: MEDICARE

## 2024-05-28 ENCOUNTER — HOSPITAL ENCOUNTER (OUTPATIENT)
Dept: GENERAL RADIOLOGY | Facility: HOSPITAL | Age: 74
Discharge: HOME | End: 2024-05-28
Admitting: FAMILY MEDICINE
Payer: MEDICARE

## 2024-05-28 VITALS
HEIGHT: 73 IN | WEIGHT: 205.5 LBS | RESPIRATION RATE: 14 BRPM | OXYGEN SATURATION: 96 % | BODY MASS INDEX: 27.23 KG/M2 | TEMPERATURE: 97.1 F | HEART RATE: 74 BPM | SYSTOLIC BLOOD PRESSURE: 120 MMHG | DIASTOLIC BLOOD PRESSURE: 64 MMHG

## 2024-05-28 DIAGNOSIS — R05.3 CHRONIC COUGH: Primary | ICD-10-CM

## 2024-05-28 PROCEDURE — 99213 OFFICE O/P EST LOW 20 MIN: CPT | Performed by: FAMILY MEDICINE

## 2024-05-28 PROCEDURE — 1159F MED LIST DOCD IN RCRD: CPT | Performed by: FAMILY MEDICINE

## 2024-05-28 PROCEDURE — G2211 COMPLEX E/M VISIT ADD ON: HCPCS | Performed by: FAMILY MEDICINE

## 2024-05-28 PROCEDURE — 3074F SYST BP LT 130 MM HG: CPT | Performed by: FAMILY MEDICINE

## 2024-05-28 PROCEDURE — 71046 X-RAY EXAM CHEST 2 VIEWS: CPT

## 2024-05-28 PROCEDURE — 1160F RVW MEDS BY RX/DR IN RCRD: CPT | Performed by: FAMILY MEDICINE

## 2024-05-28 PROCEDURE — 3078F DIAST BP <80 MM HG: CPT | Performed by: FAMILY MEDICINE

## 2024-05-28 PROCEDURE — 1126F AMNT PAIN NOTED NONE PRSNT: CPT | Performed by: FAMILY MEDICINE

## 2024-05-28 RX ORDER — PEN NEEDLE, DIABETIC 32GX 5/32"
1 NEEDLE, DISPOSABLE MISCELLANEOUS DAILY
COMMUNITY
Start: 2024-04-08

## 2024-05-28 RX ORDER — BENZONATATE 100 MG/1
100 CAPSULE ORAL 3 TIMES DAILY PRN
Qty: 30 CAPSULE | Refills: 0 | Status: SHIPPED | OUTPATIENT
Start: 2024-05-28

## 2024-05-28 RX ORDER — AZITHROMYCIN 250 MG/1
TABLET, FILM COATED ORAL
Qty: 6 TABLET | Refills: 0 | Status: SHIPPED | OUTPATIENT
Start: 2024-05-28

## 2024-05-30 RX ORDER — MELOXICAM 15 MG/1
15 TABLET ORAL DAILY
Qty: 90 TABLET | Refills: 0 | Status: SHIPPED | OUTPATIENT
Start: 2024-05-30

## 2024-06-03 ENCOUNTER — TELEPHONE (OUTPATIENT)
Dept: INTERNAL MEDICINE | Facility: CLINIC | Age: 74
End: 2024-06-03

## 2024-06-04 ENCOUNTER — TELEPHONE (OUTPATIENT)
Dept: INTERNAL MEDICINE | Facility: CLINIC | Age: 74
End: 2024-06-04

## 2024-06-11 DIAGNOSIS — I10 ESSENTIAL HYPERTENSION: ICD-10-CM

## 2024-06-11 RX ORDER — IRBESARTAN 300 MG/1
300 TABLET ORAL DAILY
Qty: 90 TABLET | Refills: 0 | Status: SHIPPED | OUTPATIENT
Start: 2024-06-11

## 2024-06-12 ENCOUNTER — HOSPITAL ENCOUNTER (OUTPATIENT)
Facility: HOSPITAL | Age: 74
Discharge: HOME | End: 2024-06-12
Attending: EMERGENCY MEDICINE | Admitting: EMERGENCY MEDICINE
Payer: MEDICARE

## 2024-06-12 VITALS
DIASTOLIC BLOOD PRESSURE: 75 MMHG | HEART RATE: 84 BPM | TEMPERATURE: 97.5 F | WEIGHT: 196 LBS | HEIGHT: 73 IN | BODY MASS INDEX: 25.98 KG/M2 | RESPIRATION RATE: 16 BRPM | OXYGEN SATURATION: 100 % | SYSTOLIC BLOOD PRESSURE: 152 MMHG

## 2024-06-12 DIAGNOSIS — R05.2 SUBACUTE COUGH: ICD-10-CM

## 2024-06-12 DIAGNOSIS — H61.20 CERUMEN IN AUDITORY CANAL ON EXAMINATION: Primary | ICD-10-CM

## 2024-06-12 PROCEDURE — 69209 REMOVE IMPACTED EAR WAX UNI: CPT | Performed by: EMERGENCY MEDICINE

## 2024-06-12 PROCEDURE — 99213 OFFICE O/P EST LOW 20 MIN: CPT | Performed by: EMERGENCY MEDICINE

## 2024-06-12 PROCEDURE — G0463 HOSPITAL OUTPT CLINIC VISIT: HCPCS | Performed by: EMERGENCY MEDICINE

## 2024-06-12 RX ORDER — FLUTICASONE PROPIONATE 50 MCG
SPRAY, SUSPENSION (ML) NASAL
Qty: 9.9 ML | Refills: 0 | Status: SHIPPED | OUTPATIENT
Start: 2024-06-12

## 2024-06-12 RX ORDER — NEOMYCIN SULFATE, POLYMYXIN B SULFATE AND HYDROCORTISONE 10; 3.5; 1 MG/ML; MG/ML; [USP'U]/ML
3 SUSPENSION/ DROPS AURICULAR (OTIC) 4 TIMES DAILY
Qty: 5 ML | Refills: 0 | Status: SHIPPED | OUTPATIENT
Start: 2024-06-12 | End: 2024-06-19

## 2024-06-12 RX ORDER — ALBUTEROL SULFATE 90 UG/1
AEROSOL, METERED RESPIRATORY (INHALATION)
Qty: 6.7 G | Refills: 0 | Status: SHIPPED | OUTPATIENT
Start: 2024-06-12

## 2024-06-12 RX ORDER — ALBUTEROL SULFATE 2.5 MG/3ML
2.5 SOLUTION RESPIRATORY (INHALATION) ONCE
Status: DISCONTINUED | OUTPATIENT
Start: 2024-06-12 | End: 2024-06-12

## 2024-06-20 ENCOUNTER — TELEPHONE (OUTPATIENT)
Dept: INTERNAL MEDICINE | Facility: CLINIC | Age: 74
End: 2024-06-20
Payer: MEDICARE

## 2024-06-20 DIAGNOSIS — H61.22 IMPACTED CERUMEN OF LEFT EAR: Primary | ICD-10-CM

## 2024-06-20 DIAGNOSIS — H61.23 CERUMEN DEBRIS ON TYMPANIC MEMBRANE OF BOTH EARS: ICD-10-CM

## 2024-06-20 DIAGNOSIS — H61.23 BILATERAL IMPACTED CERUMEN: ICD-10-CM

## 2024-07-16 DIAGNOSIS — E78.2 MIXED HYPERLIPIDEMIA: ICD-10-CM

## 2024-07-16 RX ORDER — ATORVASTATIN CALCIUM 40 MG/1
40 TABLET, FILM COATED ORAL DAILY
Qty: 90 TABLET | Refills: 0 | Status: SHIPPED | OUTPATIENT
Start: 2024-07-16

## 2024-07-24 ENCOUNTER — OFFICE VISIT (OUTPATIENT)
Dept: INTERNAL MEDICINE | Facility: CLINIC | Age: 74
End: 2024-07-24
Payer: MEDICARE

## 2024-07-24 VITALS
OXYGEN SATURATION: 98 % | BODY MASS INDEX: 27.43 KG/M2 | DIASTOLIC BLOOD PRESSURE: 82 MMHG | HEIGHT: 73 IN | RESPIRATION RATE: 16 BRPM | SYSTOLIC BLOOD PRESSURE: 146 MMHG | TEMPERATURE: 95.8 F | WEIGHT: 207 LBS | HEART RATE: 62 BPM

## 2024-07-24 DIAGNOSIS — E78.2 MIXED HYPERLIPIDEMIA: ICD-10-CM

## 2024-07-24 DIAGNOSIS — Z00.00 MEDICARE ANNUAL WELLNESS VISIT, SUBSEQUENT: ICD-10-CM

## 2024-07-24 DIAGNOSIS — R42 VERTIGO: ICD-10-CM

## 2024-07-24 DIAGNOSIS — I10 ESSENTIAL HYPERTENSION: Primary | ICD-10-CM

## 2024-07-24 PROCEDURE — 3079F DIAST BP 80-89 MM HG: CPT | Performed by: FAMILY MEDICINE

## 2024-07-24 PROCEDURE — G0439 PPPS, SUBSEQ VISIT: HCPCS | Performed by: FAMILY MEDICINE

## 2024-07-24 PROCEDURE — 1126F AMNT PAIN NOTED NONE PRSNT: CPT | Performed by: FAMILY MEDICINE

## 2024-07-24 PROCEDURE — 1170F FXNL STATUS ASSESSED: CPT | Performed by: FAMILY MEDICINE

## 2024-07-24 PROCEDURE — 3077F SYST BP >= 140 MM HG: CPT | Performed by: FAMILY MEDICINE

## 2024-07-24 RX ORDER — ESOMEPRAZOLE MAGNESIUM 40 MG/1
40 CAPSULE, DELAYED RELEASE ORAL
Qty: 90 CAPSULE | Refills: 1 | Status: SHIPPED | OUTPATIENT
Start: 2024-07-24

## 2024-07-24 RX ORDER — MECLIZINE HYDROCHLORIDE 25 MG/1
25 TABLET ORAL 3 TIMES DAILY PRN
Qty: 30 TABLET | Refills: 0 | Status: SHIPPED | OUTPATIENT
Start: 2024-07-24

## 2024-07-24 RX ORDER — ESOMEPRAZOLE MAGNESIUM 40 MG/1
40 CAPSULE, DELAYED RELEASE ORAL
COMMUNITY
End: 2024-07-24 | Stop reason: SDUPTHER

## 2024-08-19 RX ORDER — MELOXICAM 15 MG/1
15 TABLET ORAL DAILY
Qty: 90 TABLET | Refills: 0 | Status: SHIPPED | OUTPATIENT
Start: 2024-08-19

## 2024-09-04 DIAGNOSIS — I10 ESSENTIAL HYPERTENSION: ICD-10-CM

## 2024-09-04 RX ORDER — IRBESARTAN 300 MG/1
300 TABLET ORAL DAILY
Qty: 90 TABLET | Refills: 0 | Status: SHIPPED | OUTPATIENT
Start: 2024-09-04

## 2024-10-02 ENCOUNTER — TELEPHONE (OUTPATIENT)
Dept: NEUROLOGY | Facility: CLINIC | Age: 74
End: 2024-10-02

## 2024-10-04 ENCOUNTER — APPOINTMENT (OUTPATIENT)
Dept: CT IMAGING | Facility: HOSPITAL | Age: 74
End: 2024-10-04
Payer: MEDICARE

## 2024-10-04 ENCOUNTER — HOSPITAL ENCOUNTER (EMERGENCY)
Facility: HOSPITAL | Age: 74
Discharge: HOME | End: 2024-10-04
Attending: STUDENT IN AN ORGANIZED HEALTH CARE EDUCATION/TRAINING PROGRAM
Payer: MEDICARE

## 2024-10-04 ENCOUNTER — APPOINTMENT (OUTPATIENT)
Dept: GENERAL RADIOLOGY | Facility: HOSPITAL | Age: 74
End: 2024-10-04
Payer: MEDICARE

## 2024-10-04 VITALS
OXYGEN SATURATION: 98 % | WEIGHT: 200 LBS | HEIGHT: 73 IN | BODY MASS INDEX: 26.51 KG/M2 | SYSTOLIC BLOOD PRESSURE: 148 MMHG | DIASTOLIC BLOOD PRESSURE: 69 MMHG | HEART RATE: 56 BPM | TEMPERATURE: 97.7 F | RESPIRATION RATE: 16 BRPM

## 2024-10-04 DIAGNOSIS — R07.9 LEFT-SIDED CHEST PAIN: Primary | ICD-10-CM

## 2024-10-04 LAB
ALBUMIN SERPL-MCNC: 4.3 G/DL (ref 3.5–5.2)
ALBUMIN/GLOB SERPL: 1.6 G/DL
ALP SERPL-CCNC: 53 U/L (ref 39–117)
ALT SERPL W P-5'-P-CCNC: 20 U/L (ref 1–41)
ANION GAP SERPL CALCULATED.3IONS-SCNC: 11.5 MMOL/L (ref 5–15)
AST SERPL-CCNC: 20 U/L (ref 1–40)
BASOPHILS # BLD AUTO: 0.04 10*3/MM3 (ref 0–0.2)
BASOPHILS NFR BLD AUTO: 0.8 % (ref 0–1.5)
BILIRUB SERPL-MCNC: 0.5 MG/DL (ref 0–1.2)
BUN SERPL-MCNC: 19 MG/DL (ref 8–23)
BUN/CREAT SERPL: 16.1 (ref 7–25)
CALCIUM SPEC-SCNC: 9.7 MG/DL (ref 8.6–10.5)
CHLORIDE SERPL-SCNC: 102 MMOL/L (ref 98–107)
CO2 SERPL-SCNC: 24.5 MMOL/L (ref 22–29)
CREAT SERPL-MCNC: 1.18 MG/DL (ref 0.76–1.27)
DEPRECATED RDW RBC AUTO: 42.8 FL (ref 37–54)
EGFRCR SERPLBLD CKD-EPI 2021: 64.3 ML/MIN/1.73
EOSINOPHIL # BLD AUTO: 0.22 10*3/MM3 (ref 0–0.4)
EOSINOPHIL NFR BLD AUTO: 4.5 % (ref 0.3–6.2)
ERYTHROCYTE [DISTWIDTH] IN BLOOD BY AUTOMATED COUNT: 13.1 % (ref 12.3–15.4)
GEN 5 2HR TROPONIN T REFLEX: 13 NG/L
GLOBULIN UR ELPH-MCNC: 2.7 GM/DL
GLUCOSE SERPL-MCNC: 203 MG/DL (ref 65–99)
HCT VFR BLD AUTO: 33.2 % (ref 37.5–51)
HGB BLD-MCNC: 10.6 G/DL (ref 13–17.7)
HOLD SPECIMEN: NORMAL
HOLD SPECIMEN: NORMAL
IMM GRANULOCYTES # BLD AUTO: 0.01 10*3/MM3 (ref 0–0.05)
IMM GRANULOCYTES NFR BLD AUTO: 0.2 % (ref 0–0.5)
LYMPHOCYTES # BLD AUTO: 1.37 10*3/MM3 (ref 0.7–3.1)
LYMPHOCYTES NFR BLD AUTO: 27.7 % (ref 19.6–45.3)
MCH RBC QN AUTO: 28.7 PG (ref 26.6–33)
MCHC RBC AUTO-ENTMCNC: 31.9 G/DL (ref 31.5–35.7)
MCV RBC AUTO: 90 FL (ref 79–97)
MONOCYTES # BLD AUTO: 0.37 10*3/MM3 (ref 0.1–0.9)
MONOCYTES NFR BLD AUTO: 7.5 % (ref 5–12)
NEUTROPHILS NFR BLD AUTO: 2.93 10*3/MM3 (ref 1.7–7)
NEUTROPHILS NFR BLD AUTO: 59.3 % (ref 42.7–76)
NRBC BLD AUTO-RTO: 0 /100 WBC (ref 0–0.2)
PLATELET # BLD AUTO: 216 10*3/MM3 (ref 140–450)
PMV BLD AUTO: 9.9 FL (ref 6–12)
POTASSIUM SERPL-SCNC: 4.4 MMOL/L (ref 3.5–5.2)
PROT SERPL-MCNC: 7 G/DL (ref 6–8.5)
QT INTERVAL: 420 MS
QTC INTERVAL: 406 MS
RBC # BLD AUTO: 3.69 10*6/MM3 (ref 4.14–5.8)
SODIUM SERPL-SCNC: 138 MMOL/L (ref 136–145)
TROPONIN T DELTA: -4 NG/L
TROPONIN T SERPL HS-MCNC: 17 NG/L
WBC NRBC COR # BLD AUTO: 4.94 10*3/MM3 (ref 3.4–10.8)
WHOLE BLOOD HOLD COAG: NORMAL
WHOLE BLOOD HOLD SPECIMEN: NORMAL

## 2024-10-04 PROCEDURE — 36415 COLL VENOUS BLD VENIPUNCTURE: CPT

## 2024-10-04 PROCEDURE — 93005 ELECTROCARDIOGRAM TRACING: CPT | Performed by: STUDENT IN AN ORGANIZED HEALTH CARE EDUCATION/TRAINING PROGRAM

## 2024-10-04 PROCEDURE — 93005 ELECTROCARDIOGRAM TRACING: CPT

## 2024-10-04 PROCEDURE — 71275 CT ANGIOGRAPHY CHEST: CPT

## 2024-10-04 PROCEDURE — 99285 EMERGENCY DEPT VISIT HI MDM: CPT

## 2024-10-04 PROCEDURE — 80053 COMPREHEN METABOLIC PANEL: CPT

## 2024-10-04 PROCEDURE — 84484 ASSAY OF TROPONIN QUANT: CPT

## 2024-10-04 PROCEDURE — 71045 X-RAY EXAM CHEST 1 VIEW: CPT

## 2024-10-04 PROCEDURE — 25510000001 IOPAMIDOL PER 1 ML: Performed by: STUDENT IN AN ORGANIZED HEALTH CARE EDUCATION/TRAINING PROGRAM

## 2024-10-04 PROCEDURE — 93010 ELECTROCARDIOGRAM REPORT: CPT | Performed by: INTERNAL MEDICINE

## 2024-10-04 PROCEDURE — 85025 COMPLETE CBC W/AUTO DIFF WBC: CPT

## 2024-10-04 PROCEDURE — 84484 ASSAY OF TROPONIN QUANT: CPT | Performed by: STUDENT IN AN ORGANIZED HEALTH CARE EDUCATION/TRAINING PROGRAM

## 2024-10-04 RX ORDER — METHOCARBAMOL 750 MG/1
750 TABLET, FILM COATED ORAL 3 TIMES DAILY PRN
Qty: 30 TABLET | Refills: 0 | Status: SHIPPED | OUTPATIENT
Start: 2024-10-04

## 2024-10-04 RX ORDER — SODIUM CHLORIDE 0.9 % (FLUSH) 0.9 %
10 SYRINGE (ML) INJECTION AS NEEDED
Status: DISCONTINUED | OUTPATIENT
Start: 2024-10-04 | End: 2024-10-04 | Stop reason: HOSPADM

## 2024-10-04 RX ORDER — ASPIRIN 325 MG
325 TABLET ORAL ONCE
Status: COMPLETED | OUTPATIENT
Start: 2024-10-04 | End: 2024-10-04

## 2024-10-04 RX ORDER — LIDOCAINE 4 G/G
1 PATCH TOPICAL
Status: DISCONTINUED | OUTPATIENT
Start: 2024-10-04 | End: 2024-10-04 | Stop reason: HOSPADM

## 2024-10-04 RX ORDER — LIDOCAINE 50 MG/G
1 PATCH TOPICAL EVERY 24 HOURS
Qty: 30 EACH | Refills: 0 | Status: SHIPPED | OUTPATIENT
Start: 2024-10-04

## 2024-10-04 RX ORDER — IOPAMIDOL 755 MG/ML
100 INJECTION, SOLUTION INTRAVASCULAR
Status: COMPLETED | OUTPATIENT
Start: 2024-10-04 | End: 2024-10-04

## 2024-10-04 RX ADMIN — IOPAMIDOL 95 ML: 755 INJECTION, SOLUTION INTRAVENOUS at 17:31

## 2024-10-04 RX ADMIN — ASPIRIN 325 MG: 325 TABLET ORAL at 16:17

## 2024-10-10 DIAGNOSIS — E78.2 MIXED HYPERLIPIDEMIA: ICD-10-CM

## 2024-10-10 RX ORDER — ATORVASTATIN CALCIUM 40 MG/1
40 TABLET, FILM COATED ORAL DAILY
Qty: 90 TABLET | Refills: 0 | Status: SHIPPED | OUTPATIENT
Start: 2024-10-10

## 2024-11-13 RX ORDER — MELOXICAM 15 MG/1
15 TABLET ORAL DAILY
Qty: 90 TABLET | Refills: 0 | Status: SHIPPED | OUTPATIENT
Start: 2024-11-13

## 2024-11-22 RX ORDER — MELOXICAM 15 MG/1
15 TABLET ORAL DAILY
Qty: 90 TABLET | Refills: 0 | Status: SHIPPED | OUTPATIENT
Start: 2024-11-22

## 2024-12-02 DIAGNOSIS — I10 ESSENTIAL HYPERTENSION: ICD-10-CM

## 2024-12-02 RX ORDER — IRBESARTAN 300 MG/1
300 TABLET ORAL DAILY
Qty: 90 TABLET | Refills: 0 | Status: SHIPPED | OUTPATIENT
Start: 2024-12-02

## 2024-12-30 RX ORDER — ESOMEPRAZOLE MAGNESIUM 40 MG/1
40 CAPSULE, DELAYED RELEASE ORAL
Qty: 90 CAPSULE | Refills: 0 | Status: SHIPPED | OUTPATIENT
Start: 2024-12-30

## 2025-01-03 ENCOUNTER — OFFICE VISIT (OUTPATIENT)
Dept: INTERNAL MEDICINE | Facility: CLINIC | Age: 75
End: 2025-01-03
Payer: MEDICARE

## 2025-01-03 VITALS
DIASTOLIC BLOOD PRESSURE: 70 MMHG | OXYGEN SATURATION: 97 % | SYSTOLIC BLOOD PRESSURE: 148 MMHG | BODY MASS INDEX: 29.26 KG/M2 | HEIGHT: 72 IN | HEART RATE: 70 BPM | WEIGHT: 216 LBS | RESPIRATION RATE: 16 BRPM | TEMPERATURE: 97.8 F

## 2025-01-03 DIAGNOSIS — R10.9 FLANK PAIN: ICD-10-CM

## 2025-01-03 DIAGNOSIS — F41.9 ANXIETY: ICD-10-CM

## 2025-01-03 DIAGNOSIS — E78.2 MIXED HYPERLIPIDEMIA: ICD-10-CM

## 2025-01-03 DIAGNOSIS — I10 ESSENTIAL HYPERTENSION: Primary | ICD-10-CM

## 2025-01-03 PROCEDURE — G2211 COMPLEX E/M VISIT ADD ON: HCPCS | Performed by: FAMILY MEDICINE

## 2025-01-03 PROCEDURE — 99214 OFFICE O/P EST MOD 30 MIN: CPT | Performed by: FAMILY MEDICINE

## 2025-01-03 PROCEDURE — 3077F SYST BP >= 140 MM HG: CPT | Performed by: FAMILY MEDICINE

## 2025-01-03 PROCEDURE — 1126F AMNT PAIN NOTED NONE PRSNT: CPT | Performed by: FAMILY MEDICINE

## 2025-01-03 PROCEDURE — 1160F RVW MEDS BY RX/DR IN RCRD: CPT | Performed by: FAMILY MEDICINE

## 2025-01-03 PROCEDURE — 3078F DIAST BP <80 MM HG: CPT | Performed by: FAMILY MEDICINE

## 2025-01-03 PROCEDURE — 1159F MED LIST DOCD IN RCRD: CPT | Performed by: FAMILY MEDICINE

## 2025-01-03 RX ORDER — SERTRALINE HYDROCHLORIDE 25 MG/1
25 TABLET, FILM COATED ORAL DAILY
Qty: 90 TABLET | Refills: 2 | Status: SHIPPED | OUTPATIENT
Start: 2025-01-03

## 2025-01-03 NOTE — PROGRESS NOTES
"Chief Complaint  Primary Care Follow-Up (Still having low back pain on both sides) and Med Refill (Pt would like to cut dose in half for his Zoloft medication)    Subjective        Marysol Bennett presents to [facility] PRIMARY [facility]  Primary Care Follow-Up    Patient follows up for treatment of anxiety and flank pain flank pains been intermittent long-term sometimes he feels its associate with muscle movement sometimes its associate with urine he does not have any specific dysuria or frequency no radiating quality more tender and right flank than left abdominal pain  Taken a half a dose of sertraline 50 mg like continue the same he does not notice any specific unwanted side effects other than fatigue of the larger dose  Objective   Vital Signs:  /70   Pulse 70   Temp 97.8 °F (36.6 °C) (Oral)   Resp 16   Ht 182.9 cm (72.01\")   Wt 98 kg (216 lb)   SpO2 97%   BMI 29.29 kg/m²   Estimated body mass index is 29.29 kg/m² as calculated from the following:    Height as of this encounter: 182.9 cm (72.01\").    Weight as of this encounter: 98 kg (216 lb).            Physical Exam  Vitals reviewed.   Constitutional:       Appearance: Normal appearance. He is not ill-appearing.   HENT:      Head: Normocephalic and atraumatic.   Cardiovascular:      Rate and Rhythm: Normal rate and regular rhythm.      Pulses: Normal pulses.      Heart sounds: Normal heart sounds.   Pulmonary:      Effort: Pulmonary effort is normal.      Breath sounds: Normal breath sounds.   Abdominal:      General: There is no distension.      Palpations: There is no mass.      Tenderness: There is no abdominal tenderness. There is right CVA tenderness. There is no left CVA tenderness, guarding or rebound.      Hernia: No hernia is present.   Musculoskeletal:      Right lower leg: No edema.      Left lower leg: No edema.   Neurological:      Mental Status: He is alert.   Psychiatric:         Mood and Affect: Mood normal.         " "Behavior: Behavior normal.         Thought Content: Thought content normal.         Judgment: Judgment normal.        Result Review :    Common labs          3/11/2024    13:59 10/4/2024    15:25   Common Labs   Glucose 147  203    BUN 24  19    Creatinine 1.17  1.18    Sodium 141  138    Potassium 4.8  4.4    Chloride 103  102    Calcium 9.5  9.7    Albumin  4.3    Total Bilirubin  0.5    Alkaline Phosphatase  53    AST (SGOT)  20    ALT (SGPT)  20    WBC 4.5  4.94    Hemoglobin 11.3  10.6    Hematocrit 34.4  33.2    Platelets 222  216                Assessment and Plan   Diagnoses and all orders for this visit:    1. Essential hypertension (Primary)    2. Mixed hyperlipidemia  -     Lipid Panel    3. Anxiety  -     sertraline (ZOLOFT) 25 MG tablet; Take 1 tablet by mouth Daily.  Dispense: 90 tablet; Refill: 2    4. Flank pain  -     Urinalysis With Culture If Indicated -    Monitor blood pressure goals less than 140/90  Decrease sertraline dose to 25 mg  Follow-up results of urinalysis if negative recommend ultrasound recent CT scan abdomen no evidence of hydronephrosis enlarged prostate recommend consultation with urology who he has seen for ED in the past     This patient has a PCP that is the continuing focal point for all health care services, and the patient sees this physician to be evaluated for anxiety. The inherent complexity that this code () captures is not in the clinical condition itself-- anxiety --but rather the cognitition of the continued responsibility of being the focal point for all needed services for this patient.\"     Follow Up   No follow-ups on file.  Patient was given instructions and counseling regarding his condition or for health maintenance advice. Please see specific information pulled into the AVS if appropriate.             "

## 2025-01-04 LAB
APPEARANCE UR: CLEAR
BACTERIA #/AREA URNS HPF: NORMAL /[HPF]
BILIRUB UR QL STRIP: NEGATIVE
CASTS URNS QL MICRO: NORMAL /LPF
CHOLEST SERPL-MCNC: 170 MG/DL (ref 0–200)
COLOR UR: YELLOW
EPI CELLS #/AREA URNS HPF: NORMAL /HPF (ref 0–10)
GLUCOSE UR QL STRIP: ABNORMAL
HDLC SERPL-MCNC: 60 MG/DL (ref 40–60)
HGB UR QL STRIP: NEGATIVE
KETONES UR QL STRIP: NEGATIVE
LDLC SERPL CALC-MCNC: 95 MG/DL (ref 0–100)
LEUKOCYTE ESTERASE UR QL STRIP: NEGATIVE
MICRO URNS: ABNORMAL
MICRO URNS: ABNORMAL
NITRITE UR QL STRIP: NEGATIVE
PH UR STRIP: 5.5 [PH] (ref 5–7.5)
PROT UR QL STRIP: NEGATIVE
RBC #/AREA URNS HPF: NORMAL /HPF (ref 0–2)
SP GR UR STRIP: 1.02 (ref 1–1.03)
TRIGL SERPL-MCNC: 79 MG/DL (ref 0–150)
URINALYSIS REFLEX: ABNORMAL
UROBILINOGEN UR STRIP-MCNC: 0.2 MG/DL (ref 0.2–1)
VLDLC SERPL CALC-MCNC: 15 MG/DL (ref 5–40)
WBC #/AREA URNS HPF: NORMAL /HPF (ref 0–5)

## 2025-01-08 DIAGNOSIS — E78.2 MIXED HYPERLIPIDEMIA: ICD-10-CM

## 2025-01-08 RX ORDER — ATORVASTATIN CALCIUM 40 MG/1
40 TABLET, FILM COATED ORAL DAILY
Qty: 90 TABLET | Refills: 0 | Status: SHIPPED | OUTPATIENT
Start: 2025-01-08

## 2025-01-10 ENCOUNTER — PRIOR AUTHORIZATION (OUTPATIENT)
Dept: INTERNAL MEDICINE | Facility: CLINIC | Age: 75
End: 2025-01-10
Payer: MEDICARE

## 2025-02-26 DIAGNOSIS — I10 ESSENTIAL HYPERTENSION: ICD-10-CM

## 2025-02-26 RX ORDER — IRBESARTAN 300 MG/1
300 TABLET ORAL DAILY
Qty: 90 TABLET | Refills: 0 | Status: SHIPPED | OUTPATIENT
Start: 2025-02-26

## 2025-03-20 ENCOUNTER — OFFICE VISIT (OUTPATIENT)
Dept: INTERNAL MEDICINE | Facility: CLINIC | Age: 75
End: 2025-03-20
Payer: MEDICARE

## 2025-03-20 VITALS
OXYGEN SATURATION: 95 % | HEIGHT: 72 IN | SYSTOLIC BLOOD PRESSURE: 144 MMHG | DIASTOLIC BLOOD PRESSURE: 72 MMHG | RESPIRATION RATE: 16 BRPM | HEART RATE: 70 BPM | BODY MASS INDEX: 27.36 KG/M2 | WEIGHT: 202 LBS | TEMPERATURE: 98.2 F

## 2025-03-20 DIAGNOSIS — R55: ICD-10-CM

## 2025-03-20 DIAGNOSIS — Z91.89: ICD-10-CM

## 2025-03-20 DIAGNOSIS — I20.0 UNSTABLE ANGINA: ICD-10-CM

## 2025-03-20 DIAGNOSIS — E11.59 TYPE 2 DIABETES MELLITUS WITH OTHER CIRCULATORY COMPLICATION, WITH LONG-TERM CURRENT USE OF INSULIN: ICD-10-CM

## 2025-03-20 DIAGNOSIS — Z79.4 TYPE 2 DIABETES MELLITUS WITH OTHER CIRCULATORY COMPLICATION, WITH LONG-TERM CURRENT USE OF INSULIN: ICD-10-CM

## 2025-03-20 DIAGNOSIS — E78.2 MIXED HYPERLIPIDEMIA: Primary | ICD-10-CM

## 2025-03-20 DIAGNOSIS — I10 ESSENTIAL HYPERTENSION: ICD-10-CM

## 2025-03-20 RX ORDER — TERBINAFINE HYDROCHLORIDE 250 MG/1
250 TABLET ORAL DAILY
Status: ON HOLD | COMMUNITY

## 2025-03-20 NOTE — PROGRESS NOTES
"Chief Complaint  Numbness (Lt arm), Jaw Pain, and Loss of Consciousness    Subjective        Marysol Bennett presents to [facility] PRIMARY [facility]ARE  History of Present Illness  Patient follows up after recent episode of left arm pain jaw pain and syncope while on vacation in [place] happened approximately 2 weeks ago he has been back and notes similar symptoms feels well no dizziness blood pressure is well-controlled no chest pain no shortness of breath  Increase stress with significant illness of very close friend  Episode of fainting lasted few seconds he did not hurt himself or follow-up friend caught him  Symptoms began while he was sitting in a chair watching TV  Objective   Vital Signs:  /72 (BP Location: Left arm, Patient Position: Sitting)   Pulse 70   Temp 98.2 °F (36.8 °C) (Oral)   Resp 16   Ht 182.9 cm (72.01\")   Wt 91.6 kg (202 lb)   SpO2 95%   BMI 27.39 kg/m²   Estimated body mass index is 27.39 kg/m² as calculated from the following:    Height as of this encounter: 182.9 cm (72.01\").    Weight as of this encounter: 91.6 kg (202 lb).            Physical Exam  HENT:      Head: Normocephalic and atraumatic.   Eyes:      General: No scleral icterus.  Neck:      Vascular: No carotid bruit.   Cardiovascular:      Rate and Rhythm: Normal rate and regular rhythm.   Pulmonary:      Effort: Pulmonary effort is normal.      Breath sounds: Normal breath sounds.   Abdominal:      Tenderness: There is no right CVA tenderness or left CVA tenderness.   Musculoskeletal:      Right lower leg: No edema.      Left lower leg: No edema.   Lymphadenopathy:      Cervical: No cervical adenopathy.   Neurological:      Mental Status: He is alert.   Psychiatric:         Mood and Affect: Mood normal.         Behavior: Behavior normal.         Thought Content: Thought content normal.         Judgment: Judgment normal.        Result Review :    Common labs          10/4/2024    15:25 1/3/2025    10:34 "   Common Labs   Glucose 203     BUN 19     Creatinine 1.18     Sodium 138     Potassium 4.4     Chloride 102     Calcium 9.7     Albumin 4.3     Total Bilirubin 0.5     Alkaline Phosphatase 53     AST (SGOT) 20     ALT (SGPT) 20     WBC 4.94     Hemoglobin 10.6     Hematocrit 33.2     Platelets 216     Total Cholesterol  170    Triglycerides  79    HDL Cholesterol  60    LDL Cholesterol   95      CBC          10/4/2024    15:25   CBC   WBC 4.94    RBC 3.69    Hemoglobin 10.6    Hematocrit 33.2    MCV 90.0    MCH 28.7    MCHC 31.9    RDW 13.1    Platelets 216      TSH          3/19/2025    10:36   TSH   TSH 2.18          Details          This result is from an external source.                    ECG 12 Lead    Date/Time: 3/20/2025 10:45 AM  Performed by: Lavell Dunaway MD    Authorized by: Lavell Dunaway MD  Comparison: compared with previous ECG from 10/4/2024  Similar to previous ECG  Rhythm: sinus rhythm  Rate: normal  Conduction: conduction normal  ST Segments: ST segments normal  QRS axis: normal    Clinical impression: normal ECG            Assessment and Plan   Diagnoses and all orders for this visit:    1. Mixed hyperlipidemia (Primary)  -     Adult Stress Echo W/ Cont or Stress Agent if Necessary Per Protocol    2. Essential hypertension  -     Adult Stress Echo W/ Cont or Stress Agent if Necessary Per Protocol    3. Type 2 diabetes mellitus with other circulatory complication, with long-term current use of insulin  -     Adult Stress Echo W/ Cont or Stress Agent if Necessary Per Protocol    4. Syncope with risk for coronary artery disease greater than 20% in next 10 years  -     Adult Stress Echo W/ Cont or Stress Agent if Necessary Per Protocol    5. Unstable angina  -     Adult Stress Echo W/ Cont or Stress Agent if Necessary Per Protocol    Other orders  -     ECG 12 Lead    Patient of unstable angina in spite of normal CT angiogram multifunction assessment  If any recurrence symptoms patient to  "[facility] emergency department     This patient has a PCP that is the continuing focal point for all health care services, and the patient sees this physician to be evaluated for syncope. The inherent complexity that this code () captures is not in the clinical condition itself-- syncope --but rather the cognitition of the continued responsibility of being the focal point for all needed services for this patient.\"     Follow Up   Return in about 1 month (around 4/20/2025), or if symptoms worsen or fail to improve, for Recheck.  Patient was given instructions and counseling regarding his condition or for health maintenance advice. Please see specific information pulled into the AVS if appropriate.             "

## 2025-03-21 ENCOUNTER — OFFICE VISIT (OUTPATIENT)
Dept: CARDIOLOGY | Facility: CLINIC | Age: 75
End: 2025-03-21
Payer: MEDICARE

## 2025-03-21 ENCOUNTER — LAB (OUTPATIENT)
Dept: LAB | Facility: HOSPITAL | Age: 75
End: 2025-03-21
Payer: MEDICARE

## 2025-03-21 ENCOUNTER — HOSPITAL ENCOUNTER (OUTPATIENT)
Dept: CARDIOLOGY | Facility: HOSPITAL | Age: 75
Discharge: HOME | End: 2025-03-21
Payer: MEDICARE

## 2025-03-21 VITALS
HEIGHT: 72 IN | SYSTOLIC BLOOD PRESSURE: 126 MMHG | DIASTOLIC BLOOD PRESSURE: 62 MMHG | OXYGEN SATURATION: 96 % | WEIGHT: 202 LBS | HEART RATE: 56 BPM | BODY MASS INDEX: 27.36 KG/M2

## 2025-03-21 VITALS
HEART RATE: 70 BPM | SYSTOLIC BLOOD PRESSURE: 120 MMHG | WEIGHT: 197 LBS | DIASTOLIC BLOOD PRESSURE: 60 MMHG | HEIGHT: 72 IN | BODY MASS INDEX: 26.68 KG/M2

## 2025-03-21 DIAGNOSIS — I10 PRIMARY HYPERTENSION: ICD-10-CM

## 2025-03-21 DIAGNOSIS — Z79.4 TYPE 2 DIABETES MELLITUS WITHOUT COMPLICATION, WITH LONG-TERM CURRENT USE OF INSULIN: ICD-10-CM

## 2025-03-21 DIAGNOSIS — E78.2 MIXED HYPERLIPIDEMIA: ICD-10-CM

## 2025-03-21 DIAGNOSIS — I20.89 CHRONIC STABLE ANGINA: ICD-10-CM

## 2025-03-21 DIAGNOSIS — R94.39 ABNORMAL STRESS ECHO: ICD-10-CM

## 2025-03-21 DIAGNOSIS — I20.89 CHRONIC STABLE ANGINA: Primary | ICD-10-CM

## 2025-03-21 DIAGNOSIS — E11.9 TYPE 2 DIABETES MELLITUS WITHOUT COMPLICATION, WITH LONG-TERM CURRENT USE OF INSULIN: ICD-10-CM

## 2025-03-21 PROBLEM — M25.572 ACUTE LEFT ANKLE PAIN: Status: RESOLVED | Noted: 2023-05-09 | Resolved: 2025-03-21

## 2025-03-21 PROBLEM — M54.50 ACUTE BILATERAL LOW BACK PAIN WITHOUT SCIATICA: Status: RESOLVED | Noted: 2024-03-11 | Resolved: 2025-03-21

## 2025-03-21 LAB
ANION GAP SERPL CALCULATED.3IONS-SCNC: 9.5 MMOL/L (ref 5–15)
AORTIC ARCH: 2.6 CM
AORTIC DIMENSIONLESS INDEX: 0.59 (DI)
ASCENDING AORTA: 3.5 CM
AV MEAN PRESS GRAD SYS DOP V1V2: 3 MMHG
AV VMAX SYS DOP: 122 CM/SEC
BH CV ECHO MEAS - ACS: 2.46 CM
BH CV ECHO MEAS - AO MAX PG: 6 MMHG
BH CV ECHO MEAS - AO ROOT DIAM: 3.7 CM
BH CV ECHO MEAS - AO V2 VTI: 27.9 CM
BH CV ECHO MEAS - AVA(I,D): 2.24 CM2
BH CV ECHO MEAS - EDV(CUBED): 140.6 ML
BH CV ECHO MEAS - EDV(MOD-SP4): 166 ML
BH CV ECHO MEAS - EF(MOD-SP4): 62 %
BH CV ECHO MEAS - ESV(CUBED): 33.3 ML
BH CV ECHO MEAS - ESV(MOD-SP4): 63 ML
BH CV ECHO MEAS - FS: 38.1 %
BH CV ECHO MEAS - IVS/LVPW: 0.9 CM
BH CV ECHO MEAS - IVSD: 0.9 CM
BH CV ECHO MEAS - LAT PEAK E' VEL: 10.8 CM/SEC
BH CV ECHO MEAS - LV DIASTOLIC VOL/BSA (35-75): 77.6 CM2
BH CV ECHO MEAS - LV MASS(C)D: 181.4 GRAMS
BH CV ECHO MEAS - LV MAX PG: 1.98 MMHG
BH CV ECHO MEAS - LV MEAN PG: 1 MMHG
BH CV ECHO MEAS - LV SYSTOLIC VOL/BSA (12-30): 29.4 CM2
BH CV ECHO MEAS - LV V1 MAX: 70.3 CM/SEC
BH CV ECHO MEAS - LV V1 VTI: 16.6 CM
BH CV ECHO MEAS - LVIDD: 5.2 CM
BH CV ECHO MEAS - LVIDS: 3.2 CM
BH CV ECHO MEAS - LVOT AREA: 3.8 CM2
BH CV ECHO MEAS - LVOT DIAM: 2.19 CM
BH CV ECHO MEAS - LVPWD: 1 CM
BH CV ECHO MEAS - MED PEAK E' VEL: 6.1 CM/SEC
BH CV ECHO MEAS - MR MAX PG: 43.2 MMHG
BH CV ECHO MEAS - MR MAX VEL: 328.5 CM/SEC
BH CV ECHO MEAS - MV A DUR: 0.11 SEC
BH CV ECHO MEAS - MV A MAX VEL: 65.1 CM/SEC
BH CV ECHO MEAS - MV DEC SLOPE: 342.7 CM/SEC2
BH CV ECHO MEAS - MV DEC TIME: 0.22 SEC
BH CV ECHO MEAS - MV E MAX VEL: 64.3 CM/SEC
BH CV ECHO MEAS - MV E/A: 0.99
BH CV ECHO MEAS - MV MAX PG: 1.99 MMHG
BH CV ECHO MEAS - MV MEAN PG: 0.87 MMHG
BH CV ECHO MEAS - MV P1/2T: 60.3 MSEC
BH CV ECHO MEAS - MV V2 VTI: 23.7 CM
BH CV ECHO MEAS - MVA(P1/2T): 3.6 CM2
BH CV ECHO MEAS - MVA(VTI): 2.6 CM2
BH CV ECHO MEAS - PA ACC TIME: 0.11 SEC
BH CV ECHO MEAS - PA V2 MAX: 108.6 CM/SEC
BH CV ECHO MEAS - PULM A REVS DUR: 0.12 SEC
BH CV ECHO MEAS - PULM A REVS VEL: 26.2 CM/SEC
BH CV ECHO MEAS - PULM DIAS VEL: 31.2 CM/SEC
BH CV ECHO MEAS - PULM S/D: 1.71
BH CV ECHO MEAS - PULM SYS VEL: 53.3 CM/SEC
BH CV ECHO MEAS - QP/QS: 0.92
BH CV ECHO MEAS - RAP SYSTOLE: 3 MMHG
BH CV ECHO MEAS - RV MAX PG: 1.98 MMHG
BH CV ECHO MEAS - RV V1 MAX: 70.3 CM/SEC
BH CV ECHO MEAS - RV V1 VTI: 17.2 CM
BH CV ECHO MEAS - RVOT DIAM: 2.06 CM
BH CV ECHO MEAS - RVSP: 11.9 MMHG
BH CV ECHO MEAS - SUP REN AO DIAM: 2.3 CM
BH CV ECHO MEAS - SV(LVOT): 62.4 ML
BH CV ECHO MEAS - SV(MOD-SP4): 103 ML
BH CV ECHO MEAS - SV(RVOT): 57.5 ML
BH CV ECHO MEAS - SVI(LVOT): 29.1 ML/M2
BH CV ECHO MEAS - SVI(MOD-SP4): 48.1 ML/M2
BH CV ECHO MEAS - TAPSE (>1.6): 2.14 CM
BH CV ECHO MEAS - TR MAX PG: 8.9 MMHG
BH CV ECHO MEAS - TR MAX VEL: 149.3 CM/SEC
BH CV ECHO MEASUREMENTS AVERAGE E/E' RATIO: 7.61
BH CV STRESS BP STAGE 1: NORMAL
BH CV STRESS BP STAGE 2: NORMAL
BH CV STRESS DURATION MIN STAGE 1: 3
BH CV STRESS DURATION MIN STAGE 2: 2
BH CV STRESS DURATION SEC STAGE 1: 0
BH CV STRESS DURATION SEC STAGE 2: 0
BH CV STRESS ECHO POST STRESS EJECTION FRACTION EF: 55 %
BH CV STRESS GRADE STAGE 1: 10
BH CV STRESS GRADE STAGE 2: 12
BH CV STRESS HR STAGE 1: 103
BH CV STRESS HR STAGE 2: 118
BH CV STRESS METS STAGE 1: 5
BH CV STRESS METS STAGE 2: 7.5
BH CV STRESS PROTOCOL 1: NORMAL
BH CV STRESS RECOVERY BP: NORMAL MMHG
BH CV STRESS RECOVERY HR: 7 BPM
BH CV STRESS SPEED STAGE 1: 1.7
BH CV STRESS SPEED STAGE 2: 2.5
BH CV STRESS STAGE 1: 1
BH CV STRESS STAGE 2: 2
BH CV XLRA - RV BASE: 2.9 CM
BH CV XLRA - RV LENGTH: 9.3 CM
BH CV XLRA - RV MID: 2.9 CM
BH CV XLRA - TDI S': 11.3 CM/SEC
BUN SERPL-MCNC: 23 MG/DL (ref 8–23)
BUN/CREAT SERPL: 20.9 (ref 7–25)
CALCIUM SPEC-SCNC: 9.2 MG/DL (ref 8.6–10.5)
CHLORIDE SERPL-SCNC: 106 MMOL/L (ref 98–107)
CO2 SERPL-SCNC: 24.5 MMOL/L (ref 22–29)
CREAT SERPL-MCNC: 1.1 MG/DL (ref 0.76–1.27)
DEPRECATED RDW RBC AUTO: 53.7 FL (ref 37–54)
EGFRCR SERPLBLD CKD-EPI 2021: 70 ML/MIN/1.73
ERYTHROCYTE [DISTWIDTH] IN BLOOD BY AUTOMATED COUNT: 16.4 % (ref 12.3–15.4)
GLUCOSE SERPL-MCNC: 187 MG/DL (ref 65–99)
HCT VFR BLD AUTO: 33.1 % (ref 37.5–51)
HGB BLD-MCNC: 10 G/DL (ref 13–17.7)
LEFT ATRIUM VOLUME INDEX: 35.5 ML/M2
LV EF BIPLANE MOD: 62 %
MAXIMAL PREDICTED HEART RATE: 145 BPM
MCH RBC QN AUTO: 27.1 PG (ref 26.6–33)
MCHC RBC AUTO-ENTMCNC: 30.2 G/DL (ref 31.5–35.7)
MCV RBC AUTO: 89.7 FL (ref 79–97)
PERCENT MAX PREDICTED HR: 81.38 %
PLATELET # BLD AUTO: 249 10*3/MM3 (ref 140–450)
PMV BLD AUTO: 10.2 FL (ref 6–12)
POTASSIUM SERPL-SCNC: 4.7 MMOL/L (ref 3.5–5.2)
RBC # BLD AUTO: 3.69 10*6/MM3 (ref 4.14–5.8)
SINUS: 3.1 CM
SODIUM SERPL-SCNC: 140 MMOL/L (ref 136–145)
STJ: 2.9 CM
STRESS BASELINE BP: NORMAL MMHG
STRESS BASELINE HR: 77 BPM
STRESS PERCENT HR: 96 %
STRESS POST ESTIMATED WORKLOAD: 7.1 METS
STRESS POST EXERCISE DUR MIN: 5 MIN
STRESS POST EXERCISE DUR SEC: 0 SEC
STRESS POST PEAK BP: NORMAL MMHG
STRESS POST PEAK HR: 118 BPM
STRESS TARGET HR: 123 BPM
WBC NRBC COR # BLD AUTO: 4.69 10*3/MM3 (ref 3.4–10.8)

## 2025-03-21 PROCEDURE — 80048 BASIC METABOLIC PNL TOTAL CA: CPT

## 2025-03-21 PROCEDURE — 36415 COLL VENOUS BLD VENIPUNCTURE: CPT

## 2025-03-21 PROCEDURE — 93350 STRESS TTE ONLY: CPT

## 2025-03-21 PROCEDURE — 25510000001 PERFLUTREN 6.52 MG/ML SUSPENSION 2 ML VIAL: Performed by: FAMILY MEDICINE

## 2025-03-21 PROCEDURE — 93320 DOPPLER ECHO COMPLETE: CPT

## 2025-03-21 PROCEDURE — 93017 CV STRESS TEST TRACING ONLY: CPT

## 2025-03-21 PROCEDURE — 93325 DOPPLER ECHO COLOR FLOW MAPG: CPT

## 2025-03-21 PROCEDURE — 85027 COMPLETE CBC AUTOMATED: CPT

## 2025-03-21 RX ADMIN — PERFLUTREN 5 ML: 6.52 INJECTION, SUSPENSION INTRAVENOUS at 09:02

## 2025-03-21 NOTE — PROGRESS NOTES
Date of Office Visit: 25  Encounter Provider: Thomas Yoo MD  Place of Service: [facility] CARDIOLOGY  Patient Name: Marysol Bennett  :1950    Chief Complaint   Patient presents with    Abnormal Imaging   :     HPI:     Mr. Bennett is 74 y.o. and presents today as an urgent add on.    He has T2DM (oral meds and insulin), HTN, and hyperlipidemia. He has a history of provoked pulmonary embolism and no longer takes an anticoagulant. He has no family history of CAD in his first degree relatives.    He saw his endocrinology APRN this week. He was describing fatigue and an episode of tingling and burning pain in his left arm after exertion. He also felt jaw tightness on the left. As a result, he has not exerted himself as much. His PCP learned of this and ordered a stress echo for today. He almost collapsed during the study from profound fatigue. He was quite short of breath. His stress EKG and images are abnormal so he was brought in to clinic.     He denies palps, syncope, edema, LH, or CP/SOA at rest.    Past Medical History:   Diagnosis Date    Arthritis     Diverticulitis     DVT (deep venous thrombosis)     LEFT LEG, LOG ROLLED ONTO ANKLE AND CAUSED BLOOD CLOTS    GERD (gastroesophageal reflux disease)     Hyperlipidemia     Hypertension     Pulmonary embolism     RIGHT    Type 2 diabetes mellitus     Vertigo        Past Surgical History:   Procedure Laterality Date    APPENDECTOMY      COLONOSCOPY      COLONOSCOPY N/A 2023    Procedure: COLONOSCOPY INTO CECUM;  Surgeon: Jefferson Rouse Jr., MD;  Location: [facility] ENDOSCOPY;  Service: General;  Laterality: N/A;  PRE:  SCREENING /    POST: DIVERTICULOSIS, HEMORRHOIDS    HAND SURGERY Left     KNEE ARTHROSCOPY Right     KNEE CARTILAGE SURGERY Left     ROTATOR CUFF REPAIR Left     TOTAL HIP ARTHROPLASTY Right 2005    TOTAL SHOULDER REVERSE ARTHROPLASTY Left 10/2020       Social History     Socioeconomic  History    Marital status:      Spouse name: Nivia    Number of children: 2   Tobacco Use    Smoking status: Never     Passive exposure: Past    Smokeless tobacco: Never   Vaping Use    Vaping status: Never Used   Substance and Sexual Activity    Alcohol use: Yes     Comment: RARE / 12 beers a yr    Drug use: No    Sexual activity: Defer       Family History   Problem Relation Age of Onset    Intracerebral hemorrhage Mother     Heart attack Father     Diabetes Father     Diabetes Sister     Diabetes Paternal Grandmother        Review of Systems   Constitutional: Positive for malaise/fatigue.   Cardiovascular:  Positive for dyspnea on exertion.   Neurological:  Positive for paresthesias.   All other systems reviewed and are negative.      No Known Allergies      Current Outpatient Medications:     acetaminophen (TYLENOL) 650 MG 8 hr tablet, Take 1 tablet by mouth Every 8 (Eight) Hours As Needed for Mild Pain., Disp: , Rfl:     albuterol sulfate  (90 Base) MCG/ACT inhaler, 2 puffs every 6h prn cough/wheezing, Disp: 6.7 g, Rfl: 0    atorvastatin (LIPITOR) 40 MG tablet, Take 1 tablet by mouth once daily, Disp: 90 tablet, Rfl: 0    BD Pen Needle Meredith 2nd Gen 32G X 4 MM misc, 1 each by Other route Daily., Disp: , Rfl:     carbamide peroxide (DEBROX) 6.5 % otic solution, Administer 5 drops into the left ear 3 (Three) Times a Week., Disp: 22 mL, Rfl: 0    Cyanocobalamin (VITAMIN B-12 PO), Take 1 tablet by mouth Daily., Disp: , Rfl:     esomeprazole (nexIUM) 40 MG capsule, TAKE 1 CAPSULE BY MOUTH ONCE DAILY IN THE MORNING BEFORE BREAKFAST, Disp: 90 capsule, Rfl: 0    fluticasone (FLONASE) 50 MCG/ACT nasal spray, 2 sprays to each nostril daily, Disp: 9.9 mL, Rfl: 0    glimepiride (AMARYL) 4 MG tablet, Take 1 tablet by mouth 2 (Two) Times a Day., Disp: , Rfl:     hydrochlorothiazide (HYDRODIURIL) 12.5 MG tablet, Take 1 tablet by mouth Daily., Disp: , Rfl:     Insulin Glargine (TOUJEO SOLOSTAR SC), Inject 18 Units  "under the skin into the appropriate area as directed Daily., Disp: , Rfl:     Insulin Lispro (humaLOG) 100 UNIT/ML injection, Inject  under the skin into the appropriate area as directed 3 (Three) Times a Day Before Meals., Disp: , Rfl:     irbesartan (AVAPRO) 300 MG tablet, Take 1 tablet by mouth once daily, Disp: 90 tablet, Rfl: 0    lidocaine (LIDODERM) 5 %, Place 1 patch on the skin as directed by provider Daily. Remove & Discard patch within 12 hours or as directed by MD, Disp: 30 each, Rfl: 0    meclizine (ANTIVERT) 25 MG tablet, Take 1 tablet by mouth 3 (Three) Times a Day As Needed for Dizziness., Disp: 30 tablet, Rfl: 0    meloxicam (MOBIC) 15 MG tablet, Take 1 tablet by mouth once daily, Disp: 90 tablet, Rfl: 0    metFORMIN (GLUCOPHAGE) 500 MG tablet, Take 2 tablets by mouth 2 (Two) Times a Day., Disp: , Rfl:     methocarbamol (ROBAXIN) 750 MG tablet, Take 1 tablet by mouth 3 (Three) Times a Day As Needed for Muscle Spasms., Disp: 30 tablet, Rfl: 0    Potassium 99 MG tablet, Take 99 mg by mouth Daily., Disp: , Rfl:     Prasterone, DHEA, (DHEA PO), Take 1 tablet by mouth Daily., Disp: , Rfl:     Probiotic Product (PROBIOTIC-10 PO), Take 1 tablet by mouth Daily., Disp: , Rfl:     sertraline (ZOLOFT) 25 MG tablet, Take 1 tablet by mouth Daily., Disp: 90 tablet, Rfl: 2    terbinafine (lamiSIL) 250 MG tablet, Take 1 tablet by mouth Daily., Disp: , Rfl:     Trulicity 0.75 MG/0.5ML solution pen-injector, Inject 0.75 mg as directed 1 (One) Time Per Week., Disp: , Rfl:     TURMERIC PO, Take 1 tablet by mouth Daily., Disp: , Rfl:   No current facility-administered medications for this visit.      Objective:     Vitals:    03/21/25 0919   BP: 120/60   BP Location: Left arm   Pulse: 70   Weight: 89.4 kg (197 lb)   Height: 182.9 cm (72\")     Body mass index is 26.72 kg/m².    Vitals reviewed.   Constitutional:       Appearance: Well-developed and not in distress.   Eyes:      Conjunctiva/sclera: Conjunctivae normal. "   HENT:      Head: Normocephalic.      Nose: Nose normal.   Neck:      Thyroid: Thyroid normal.      Vascular: No JVD. JVD normal.      Lymphadenopathy: No cervical adenopathy.   Pulmonary:      Effort: Pulmonary effort is normal.      Breath sounds: Normal breath sounds.   Cardiovascular:      Normal rate. Regular rhythm.      Murmurs: There is no murmur.   Pulses:     Intact distal pulses.   Edema:     Peripheral edema absent.   Abdominal:      Palpations: Abdomen is soft.      Tenderness: There is no abdominal tenderness.   Musculoskeletal: Normal range of motion.      Cervical back: Normal range of motion. Skin:     General: Skin is warm and dry.   Neurological:      General: No focal deficit present.      Mental Status: Alert and oriented to person, place, and time.      Cranial Nerves: No cranial nerve deficit.   Psychiatric:         Behavior: Behavior normal.         Thought Content: Thought content normal.         Judgment: Judgment normal.           ECG 12 Lead    Date/Time: 3/21/2025 1:18 PM  Performed by: hTomas Yoo MD    Authorized by: Thomas Yoo MD  Comparison: not compared with previous ECG   Previous ECG: no previous ECG available  Rhythm: sinus rhythm  Conduction: conduction normal  ST Segments: ST segments normal  T Waves: T waves normal  QRS axis: normal  Other: no other findings    Clinical impression: normal ECG            Assessment:       Diagnosis Plan   1. Chronic stable angina  Case Request Cath Lab: Coronary angiography, Left heart cath    Basic Metabolic Panel    CBC (No Diff)      2. Abnormal stress echo  Case Request Cath Lab: Coronary angiography, Left heart cath    Basic Metabolic Panel    CBC (No Diff)      3. Type 2 diabetes mellitus without complication, with long-term current use of insulin        4. Primary hypertension        5. Mixed hyperlipidemia               Plan:       Mr Bennett is a 75yo man with several risk factors for ASCVD who had one episode of arm and jaw  discomfort but has had exertional fatigue and dyspnea. A stress echo was performed today; despite not reaching an adequate heart rate, he became profoundly fatigued and dyspneic. His EKG was abnormal. His images showed apical and anterolateral ischemia.    These findings were discussed with him and his wife. I recommend coronary angiography, and they agree to proceed. I started low dose aspirin, 81mg daily. I cut irbesartan in half to allow the addition of amlodipine as an antianginal.    Sincerely,       Thomas Yoo MD

## 2025-03-21 NOTE — H&P (VIEW-ONLY)
Date of Office Visit: 25  Encounter Provider: Thomas Yoo MD  Place of Service: [facility] CARDIOLOGY  Patient Name: Marysol Bennett  :1950    Chief Complaint   Patient presents with    Abnormal Imaging   :     HPI:     Mr. Bennett is 74 y.o. and presents today as an urgent add on.    He has T2DM (oral meds and insulin), HTN, and hyperlipidemia. He has a history of provoked pulmonary embolism and no longer takes an anticoagulant. He has no family history of CAD in his first degree relatives.    He saw his endocrinology APRN this week. He was describing fatigue and an episode of tingling and burning pain in his left arm after exertion. He also felt jaw tightness on the left. As a result, he has not exerted himself as much. His PCP learned of this and ordered a stress echo for today. He almost collapsed during the study from profound fatigue. He was quite short of breath. His stress EKG and images are abnormal so he was brought in to clinic.     He denies palps, syncope, edema, LH, or CP/SOA at rest.    Past Medical History:   Diagnosis Date    Arthritis     Diverticulitis     DVT (deep venous thrombosis)     LEFT LEG, LOG ROLLED ONTO ANKLE AND CAUSED BLOOD CLOTS    GERD (gastroesophageal reflux disease)     Hyperlipidemia     Hypertension     Pulmonary embolism     RIGHT    Type 2 diabetes mellitus     Vertigo        Past Surgical History:   Procedure Laterality Date    APPENDECTOMY      COLONOSCOPY      COLONOSCOPY N/A 2023    Procedure: COLONOSCOPY INTO CECUM;  Surgeon: Jefferson Rouse Jr., MD;  Location: [facility] ENDOSCOPY;  Service: General;  Laterality: N/A;  PRE:  SCREENING /    POST: DIVERTICULOSIS, HEMORRHOIDS    HAND SURGERY Left     KNEE ARTHROSCOPY Right     KNEE CARTILAGE SURGERY Left     ROTATOR CUFF REPAIR Left     TOTAL HIP ARTHROPLASTY Right 2005    TOTAL SHOULDER REVERSE ARTHROPLASTY Left 10/2020       Social History     Socioeconomic  History    Marital status:      Spouse name: Nivia    Number of children: 2   Tobacco Use    Smoking status: Never     Passive exposure: Past    Smokeless tobacco: Never   Vaping Use    Vaping status: Never Used   Substance and Sexual Activity    Alcohol use: Yes     Comment: RARE / 12 beers a yr    Drug use: No    Sexual activity: Defer       Family History   Problem Relation Age of Onset    Intracerebral hemorrhage Mother     Heart attack Father     Diabetes Father     Diabetes Sister     Diabetes Paternal Grandmother        Review of Systems   Constitutional: Positive for malaise/fatigue.   Cardiovascular:  Positive for dyspnea on exertion.   Neurological:  Positive for paresthesias.   All other systems reviewed and are negative.      No Known Allergies      Current Outpatient Medications:     acetaminophen (TYLENOL) 650 MG 8 hr tablet, Take 1 tablet by mouth Every 8 (Eight) Hours As Needed for Mild Pain., Disp: , Rfl:     albuterol sulfate  (90 Base) MCG/ACT inhaler, 2 puffs every 6h prn cough/wheezing, Disp: 6.7 g, Rfl: 0    atorvastatin (LIPITOR) 40 MG tablet, Take 1 tablet by mouth once daily, Disp: 90 tablet, Rfl: 0    BD Pen Needle Meredith 2nd Gen 32G X 4 MM misc, 1 each by Other route Daily., Disp: , Rfl:     carbamide peroxide (DEBROX) 6.5 % otic solution, Administer 5 drops into the left ear 3 (Three) Times a Week., Disp: 22 mL, Rfl: 0    Cyanocobalamin (VITAMIN B-12 PO), Take 1 tablet by mouth Daily., Disp: , Rfl:     esomeprazole (nexIUM) 40 MG capsule, TAKE 1 CAPSULE BY MOUTH ONCE DAILY IN THE MORNING BEFORE BREAKFAST, Disp: 90 capsule, Rfl: 0    fluticasone (FLONASE) 50 MCG/ACT nasal spray, 2 sprays to each nostril daily, Disp: 9.9 mL, Rfl: 0    glimepiride (AMARYL) 4 MG tablet, Take 1 tablet by mouth 2 (Two) Times a Day., Disp: , Rfl:     hydrochlorothiazide (HYDRODIURIL) 12.5 MG tablet, Take 1 tablet by mouth Daily., Disp: , Rfl:     Insulin Glargine (TOUJEO SOLOSTAR SC), Inject 18 Units  "under the skin into the appropriate area as directed Daily., Disp: , Rfl:     Insulin Lispro (humaLOG) 100 UNIT/ML injection, Inject  under the skin into the appropriate area as directed 3 (Three) Times a Day Before Meals., Disp: , Rfl:     irbesartan (AVAPRO) 300 MG tablet, Take 1 tablet by mouth once daily, Disp: 90 tablet, Rfl: 0    lidocaine (LIDODERM) 5 %, Place 1 patch on the skin as directed by provider Daily. Remove & Discard patch within 12 hours or as directed by MD, Disp: 30 each, Rfl: 0    meclizine (ANTIVERT) 25 MG tablet, Take 1 tablet by mouth 3 (Three) Times a Day As Needed for Dizziness., Disp: 30 tablet, Rfl: 0    meloxicam (MOBIC) 15 MG tablet, Take 1 tablet by mouth once daily, Disp: 90 tablet, Rfl: 0    metFORMIN (GLUCOPHAGE) 500 MG tablet, Take 2 tablets by mouth 2 (Two) Times a Day., Disp: , Rfl:     methocarbamol (ROBAXIN) 750 MG tablet, Take 1 tablet by mouth 3 (Three) Times a Day As Needed for Muscle Spasms., Disp: 30 tablet, Rfl: 0    Potassium 99 MG tablet, Take 99 mg by mouth Daily., Disp: , Rfl:     Prasterone, DHEA, (DHEA PO), Take 1 tablet by mouth Daily., Disp: , Rfl:     Probiotic Product (PROBIOTIC-10 PO), Take 1 tablet by mouth Daily., Disp: , Rfl:     sertraline (ZOLOFT) 25 MG tablet, Take 1 tablet by mouth Daily., Disp: 90 tablet, Rfl: 2    terbinafine (lamiSIL) 250 MG tablet, Take 1 tablet by mouth Daily., Disp: , Rfl:     Trulicity 0.75 MG/0.5ML solution pen-injector, Inject 0.75 mg as directed 1 (One) Time Per Week., Disp: , Rfl:     TURMERIC PO, Take 1 tablet by mouth Daily., Disp: , Rfl:   No current facility-administered medications for this visit.      Objective:     Vitals:    03/21/25 0919   BP: 120/60   BP Location: Left arm   Pulse: 70   Weight: 89.4 kg (197 lb)   Height: 182.9 cm (72\")     Body mass index is 26.72 kg/m².    Vitals reviewed.   Constitutional:       Appearance: Well-developed and not in distress.   Eyes:      Conjunctiva/sclera: Conjunctivae normal. "   HENT:      Head: Normocephalic.      Nose: Nose normal.   Neck:      Thyroid: Thyroid normal.      Vascular: No JVD. JVD normal.      Lymphadenopathy: No cervical adenopathy.   Pulmonary:      Effort: Pulmonary effort is normal.      Breath sounds: Normal breath sounds.   Cardiovascular:      Normal rate. Regular rhythm.      Murmurs: There is no murmur.   Pulses:     Intact distal pulses.   Edema:     Peripheral edema absent.   Abdominal:      Palpations: Abdomen is soft.      Tenderness: There is no abdominal tenderness.   Musculoskeletal: Normal range of motion.      Cervical back: Normal range of motion. Skin:     General: Skin is warm and dry.   Neurological:      General: No focal deficit present.      Mental Status: Alert and oriented to person, place, and time.      Cranial Nerves: No cranial nerve deficit.   Psychiatric:         Behavior: Behavior normal.         Thought Content: Thought content normal.         Judgment: Judgment normal.           ECG 12 Lead    Date/Time: 3/21/2025 1:18 PM  Performed by: Thomas Yoo MD    Authorized by: Thomas Yoo MD  Comparison: not compared with previous ECG   Previous ECG: no previous ECG available  Rhythm: sinus rhythm  Conduction: conduction normal  ST Segments: ST segments normal  T Waves: T waves normal  QRS axis: normal  Other: no other findings    Clinical impression: normal ECG            Assessment:       Diagnosis Plan   1. Chronic stable angina  Case Request Cath Lab: Coronary angiography, Left heart cath    Basic Metabolic Panel    CBC (No Diff)      2. Abnormal stress echo  Case Request Cath Lab: Coronary angiography, Left heart cath    Basic Metabolic Panel    CBC (No Diff)      3. Type 2 diabetes mellitus without complication, with long-term current use of insulin        4. Primary hypertension        5. Mixed hyperlipidemia               Plan:       Mr Bennett is a 75yo man with several risk factors for ASCVD who had one episode of arm and jaw  discomfort but has had exertional fatigue and dyspnea. A stress echo was performed today; despite not reaching an adequate heart rate, he became profoundly fatigued and dyspneic. His EKG was abnormal. His images showed apical and anterolateral ischemia.    These findings were discussed with him and his wife. I recommend coronary angiography, and they agree to proceed. I started low dose aspirin, 81mg daily. I cut irbesartan in half to allow the addition of amlodipine as an antianginal.    Sincerely,       Thomas Yoo MD

## 2025-03-21 NOTE — LETTER
2025     Lavell Dunaway MD  [address] [facility]  [address]  [place] [id number]    Patient: Marysol Bennett   YOB: 1950   Date of Visit: 3/21/2025     Dear Lavell Dunaway MD:       Thank you for referring Marysol Bennett to me for evaluation. Below are the relevant portions of my assessment and plan of care.    If you have questions, please do not hesitate to call me. I look forward to following Marysol along with you.         Sincerely,        Thomas Yoo MD        CC: BELKYS Medeiros Jamie D, MD  [phone]  Sign when Signing Visit  Date of Office Visit: 25  Encounter Provider: Thomas Yoo MD  Place of Service: [facility] CARDIOLOGY  Patient Name: Marysol Bennett  :1950    Chief Complaint   Patient presents with   • Abnormal Imaging   :     HPI:     Mr. Bennett is 74 y.o. and presents today as an urgent add on.    He has T2DM (oral meds and insulin), HTN, and hyperlipidemia. He has a history of provoked pulmonary embolism and no longer takes an anticoagulant. He has no family history of CAD in his first degree relatives.    He saw his endocrinology APRN this week. He was describing fatigue and an episode of tingling and burning pain in his left arm after exertion. He also felt jaw tightness on the left. As a result, he has not exerted himself as much. His PCP learned of this and ordered a stress echo for today. He almost collapsed during the study from profound fatigue. He was quite short of breath. His stress EKG and images are abnormal so he was brought in to clinic.     He denies palps, syncope, edema, LH, or CP/SOA at rest.    Past Medical History:   Diagnosis Date   • Arthritis    • Diverticulitis    • DVT (deep venous thrombosis)     LEFT LEG, LOG ROLLED ONTO ANKLE AND CAUSED BLOOD CLOTS   • GERD (gastroesophageal reflux disease)    • Hyperlipidemia    • Hypertension    • Pulmonary embolism     RIGHT    • Type 2 diabetes mellitus    • Vertigo        Past Surgical History:   Procedure Laterality Date   • APPENDECTOMY  1977   • COLONOSCOPY     • COLONOSCOPY N/A 08/11/2023    Procedure: COLONOSCOPY INTO CECUM;  Surgeon: Jefferson Rouse Jr., MD;  Location: [facility] ENDOSCOPY;  Service: General;  Laterality: N/A;  PRE:  SCREENING /    POST: DIVERTICULOSIS, HEMORRHOIDS   • HAND SURGERY Left    • KNEE ARTHROSCOPY Right    • KNEE CARTILAGE SURGERY Left    • ROTATOR CUFF REPAIR Left 2005   • TOTAL HIP ARTHROPLASTY Right 2005   • TOTAL SHOULDER REVERSE ARTHROPLASTY Left 10/2020       Social History     Socioeconomic History   • Marital status:      Spouse name: Nivia   • Number of children: 2   Tobacco Use   • Smoking status: Never     Passive exposure: Past   • Smokeless tobacco: Never   Vaping Use   • Vaping status: Never Used   Substance and Sexual Activity   • Alcohol use: Yes     Comment: RARE / 12 beers a yr   • Drug use: No   • Sexual activity: Defer       Family History   Problem Relation Age of Onset   • Intracerebral hemorrhage Mother    • Heart attack Father    • Diabetes Father    • Diabetes Sister    • Diabetes Paternal Grandmother        Review of Systems   Constitutional: Positive for malaise/fatigue.   Cardiovascular:  Positive for dyspnea on exertion.   Neurological:  Positive for paresthesias.   All other systems reviewed and are negative.      No Known Allergies      Current Outpatient Medications:   •  acetaminophen (TYLENOL) 650 MG 8 hr tablet, Take 1 tablet by mouth Every 8 (Eight) Hours As Needed for Mild Pain., Disp: , Rfl:   •  albuterol sulfate  (90 Base) MCG/ACT inhaler, 2 puffs every 6h prn cough/wheezing, Disp: 6.7 g, Rfl: 0  •  atorvastatin (LIPITOR) 40 MG tablet, Take 1 tablet by mouth once daily, Disp: 90 tablet, Rfl: 0  •  BD Pen Needle Meredith 2nd Gen 32G X 4 MM misc, 1 each by Other route Daily., Disp: , Rfl:   •  carbamide peroxide (DEBROX) 6.5 % otic solution, Administer 5  drops into the left ear 3 (Three) Times a Week., Disp: 22 mL, Rfl: 0  •  Cyanocobalamin (VITAMIN B-12 PO), Take 1 tablet by mouth Daily., Disp: , Rfl:   •  esomeprazole (nexIUM) 40 MG capsule, TAKE 1 CAPSULE BY MOUTH ONCE DAILY IN THE MORNING BEFORE BREAKFAST, Disp: 90 capsule, Rfl: 0  •  fluticasone (FLONASE) 50 MCG/ACT nasal spray, 2 sprays to each nostril daily, Disp: 9.9 mL, Rfl: 0  •  glimepiride (AMARYL) 4 MG tablet, Take 1 tablet by mouth 2 (Two) Times a Day., Disp: , Rfl:   •  hydrochlorothiazide (HYDRODIURIL) 12.5 MG tablet, Take 1 tablet by mouth Daily., Disp: , Rfl:   •  Insulin Glargine (TOUJEO SOLOSTAR SC), Inject 18 Units under the skin into the appropriate area as directed Daily., Disp: , Rfl:   •  Insulin Lispro (humaLOG) 100 UNIT/ML injection, Inject  under the skin into the appropriate area as directed 3 (Three) Times a Day Before Meals., Disp: , Rfl:   •  irbesartan (AVAPRO) 300 MG tablet, Take 1 tablet by mouth once daily, Disp: 90 tablet, Rfl: 0  •  lidocaine (LIDODERM) 5 %, Place 1 patch on the skin as directed by provider Daily. Remove & Discard patch within 12 hours or as directed by MD, Disp: 30 each, Rfl: 0  •  meclizine (ANTIVERT) 25 MG tablet, Take 1 tablet by mouth 3 (Three) Times a Day As Needed for Dizziness., Disp: 30 tablet, Rfl: 0  •  meloxicam (MOBIC) 15 MG tablet, Take 1 tablet by mouth once daily, Disp: 90 tablet, Rfl: 0  •  metFORMIN (GLUCOPHAGE) 500 MG tablet, Take 2 tablets by mouth 2 (Two) Times a Day., Disp: , Rfl:   •  methocarbamol (ROBAXIN) 750 MG tablet, Take 1 tablet by mouth 3 (Three) Times a Day As Needed for Muscle Spasms., Disp: 30 tablet, Rfl: 0  •  Potassium 99 MG tablet, Take 99 mg by mouth Daily., Disp: , Rfl:   •  Prasterone, DHEA, (DHEA PO), Take 1 tablet by mouth Daily., Disp: , Rfl:   •  Probiotic Product (PROBIOTIC-10 PO), Take 1 tablet by mouth Daily., Disp: , Rfl:   •  sertraline (ZOLOFT) 25 MG tablet, Take 1 tablet by mouth Daily., Disp: 90 tablet, Rfl:  "2  •  terbinafine (lamiSIL) 250 MG tablet, Take 1 tablet by mouth Daily., Disp: , Rfl:   •  Trulicity 0.75 MG/0.5ML solution pen-injector, Inject 0.75 mg as directed 1 (One) Time Per Week., Disp: , Rfl:   •  TURMERIC PO, Take 1 tablet by mouth Daily., Disp: , Rfl:   No current facility-administered medications for this visit.      Objective:     Vitals:    03/21/25 0919   BP: 120/60   BP Location: Left arm   Pulse: 70   Weight: 89.4 kg (197 lb)   Height: 182.9 cm (72\")     Body mass index is 26.72 kg/m².    Vitals reviewed.   Constitutional:       Appearance: Well-developed and not in distress.   Eyes:      Conjunctiva/sclera: Conjunctivae normal.   HENT:      Head: Normocephalic.      Nose: Nose normal.   Neck:      Thyroid: Thyroid normal.      Vascular: No JVD. JVD normal.      Lymphadenopathy: No cervical adenopathy.   Pulmonary:      Effort: Pulmonary effort is normal.      Breath sounds: Normal breath sounds.   Cardiovascular:      Normal rate. Regular rhythm.      Murmurs: There is no murmur.   Pulses:     Intact distal pulses.   Edema:     Peripheral edema absent.   Abdominal:      Palpations: Abdomen is soft.      Tenderness: There is no abdominal tenderness.   Musculoskeletal: Normal range of motion.      Cervical back: Normal range of motion. Skin:     General: Skin is warm and dry.   Neurological:      General: No focal deficit present.      Mental Status: Alert and oriented to person, place, and time.      Cranial Nerves: No cranial nerve deficit.   Psychiatric:         Behavior: Behavior normal.         Thought Content: Thought content normal.         Judgment: Judgment normal.           ECG 12 Lead    Date/Time: 3/21/2025 1:18 PM  Performed by: Thomas Yoo MD    Authorized by: Thomas Yoo MD  Comparison: not compared with previous ECG   Previous ECG: no previous ECG available  Rhythm: sinus rhythm  Conduction: conduction normal  ST Segments: ST segments normal  T Waves: T waves normal  QRS axis: " normal  Other: no other findings    Clinical impression: normal ECG            Assessment:       Diagnosis Plan   1. Chronic stable angina  Case Request Cath Lab: Coronary angiography, Left heart cath    Basic Metabolic Panel    CBC (No Diff)      2. Abnormal stress echo  Case Request Cath Lab: Coronary angiography, Left heart cath    Basic Metabolic Panel    CBC (No Diff)      3. Type 2 diabetes mellitus without complication, with long-term current use of insulin        4. Primary hypertension        5. Mixed hyperlipidemia               Plan:       Mr Bennett is a 73yo man with several risk factors for ASCVD who had one episode of arm and jaw discomfort but has had exertional fatigue and dyspnea. A stress echo was performed today; despite not reaching an adequate heart rate, he became profoundly fatigued and dyspneic. His EKG was abnormal. His images showed apical and anterolateral ischemia.    These findings were discussed with him and his wife. I recommend coronary angiography, and they agree to proceed. I started low dose aspirin, 81mg daily. I cut irbesartan in half to allow the addition of amlodipine as an antianginal.    Sincerely,       Thomas Yoo MD

## 2025-03-21 NOTE — PROGRESS NOTES
RM:________    Referral Provider: No ref. provider Lavell Keller MD    NEW PATIENT/ CONSULT  PREVIOUS CARDIOLOGIST: ______________________________    CARDIAC TESTING: __________________________________________________    : 1950   AGE: 74 y.o.    2025  REASON FOR VISIT/  CC:      WT: ____________ BP: __________L __________R/ HR_______________    ALLERGIES:  Patient has no known allergies.  SMOKING HISTORY  Social History     Tobacco Use    Smoking status: Never    Smokeless tobacco: Never   Vaping Use    Vaping status: Never Used   Substance Use Topics    Alcohol use: Yes     Comment: RARE    Drug use: No          H/O: MI_____   STROKE________   GOUT_____   ANEMIA______     CAROTID________ HIV____ CAD_______ HYPERCHOL _____    H/O: CHF _____   RF____ DM___ HTN_______PVD____THYROID DISEASE_______    PMH: GI ____   HEPATITIS ___ KIDNEY DISEASE ___ LUNG DISEASE _______     SLEEP APNEA ____ BLOOD CLOTS ____ DVT ____ VEIN STRIPPING ___________      STOP BANG _________ (CARDIO ONCOLOGY ONLY)    CANCER _________________________________ CHEMO/ RADIATION__________

## 2025-03-24 ENCOUNTER — HOSPITAL ENCOUNTER (INPATIENT)
Facility: HOSPITAL | Age: 75
LOS: 1 days | Discharge: HOME | End: 2025-03-26
Attending: STUDENT IN AN ORGANIZED HEALTH CARE EDUCATION/TRAINING PROGRAM | Admitting: STUDENT IN AN ORGANIZED HEALTH CARE EDUCATION/TRAINING PROGRAM
Payer: MEDICARE

## 2025-03-24 ENCOUNTER — APPOINTMENT (OUTPATIENT)
Dept: GENERAL RADIOLOGY | Facility: HOSPITAL | Age: 75
End: 2025-03-24
Payer: MEDICARE

## 2025-03-24 ENCOUNTER — PREP FOR SURGERY (OUTPATIENT)
Dept: OTHER | Facility: HOSPITAL | Age: 75
End: 2025-03-24
Payer: MEDICARE

## 2025-03-24 DIAGNOSIS — I13.0 HYPERTENSIVE HEART AND CHRONIC KIDNEY DISEASE WITH HEART FAILURE AND STAGE 1 THROUGH STAGE 4 CHRONIC KIDNEY DISEASE, OR UNSPECIFIED CHRONIC KIDNEY DISEASE: ICD-10-CM

## 2025-03-24 DIAGNOSIS — R79.1 ABNORMAL COAGULATION PROFILE: ICD-10-CM

## 2025-03-24 DIAGNOSIS — T82.311A: ICD-10-CM

## 2025-03-24 DIAGNOSIS — I25.118 CORONARY ARTERY DISEASE OF NATIVE HEART WITH STABLE ANGINA PECTORIS, UNSPECIFIED VESSEL OR LESION TYPE: Primary | ICD-10-CM

## 2025-03-24 DIAGNOSIS — R94.39 ABNORMAL STRESS ECHO: ICD-10-CM

## 2025-03-24 DIAGNOSIS — R79.9 ABNORMAL FINDING OF BLOOD CHEMISTRY, UNSPECIFIED: ICD-10-CM

## 2025-03-24 DIAGNOSIS — I20.89 CHRONIC STABLE ANGINA: ICD-10-CM

## 2025-03-24 DIAGNOSIS — R93.1 ABNORMAL FINDINGS ON DIAGNOSTIC IMAGING OF HEART AND CORONARY CIRCULATION: ICD-10-CM

## 2025-03-24 LAB
ALBUMIN SERPL-MCNC: 4 G/DL (ref 3.5–5.2)
ALBUMIN/GLOB SERPL: 1.5 G/DL
ALP SERPL-CCNC: 50 U/L (ref 39–117)
ALT SERPL W P-5'-P-CCNC: 18 U/L (ref 1–41)
ANION GAP SERPL CALCULATED.3IONS-SCNC: 9 MMOL/L (ref 5–15)
APTT PPP: 26.7 SECONDS (ref 22.7–35.4)
ARTERIAL PATENCY WRIST A: ABNORMAL
AST SERPL-CCNC: 15 U/L (ref 1–40)
ATMOSPHERIC PRESS: 749.4 MMHG
BASE EXCESS BLDA CALC-SCNC: 3.7 MMOL/L (ref 0–2)
BASOPHILS # BLD AUTO: 0.04 10*3/MM3 (ref 0–0.2)
BASOPHILS NFR BLD AUTO: 0.7 % (ref 0–1.5)
BDY SITE: ABNORMAL
BILIRUB SERPL-MCNC: 0.4 MG/DL (ref 0–1.2)
BUN SERPL-MCNC: 20 MG/DL (ref 8–23)
BUN/CREAT SERPL: 20.8 (ref 7–25)
CALCIUM SPEC-SCNC: 9.2 MG/DL (ref 8.6–10.5)
CHLORIDE SERPL-SCNC: 106 MMOL/L (ref 98–107)
CHOLEST SERPL-MCNC: 115 MG/DL (ref 0–200)
CLOSE TME COLL+ADP + EPINEP PNL BLD: 87 % (ref 86–100)
CO2 SERPL-SCNC: 25 MMOL/L (ref 22–29)
CREAT SERPL-MCNC: 0.96 MG/DL (ref 0.76–1.27)
DEPRECATED RDW RBC AUTO: 52.1 FL (ref 37–54)
DEVICE COMMENT: ABNORMAL
EGFRCR SERPLBLD CKD-EPI 2021: 82.4 ML/MIN/1.73
EOSINOPHIL # BLD AUTO: 0.21 10*3/MM3 (ref 0–0.4)
EOSINOPHIL NFR BLD AUTO: 3.6 % (ref 0.3–6.2)
ERYTHROCYTE [DISTWIDTH] IN BLOOD BY AUTOMATED COUNT: 16 % (ref 12.3–15.4)
GLOBULIN UR ELPH-MCNC: 2.7 GM/DL
GLUCOSE BLDC GLUCOMTR-MCNC: 108 MG/DL (ref 70–130)
GLUCOSE BLDC GLUCOMTR-MCNC: 144 MG/DL (ref 70–130)
GLUCOSE BLDC GLUCOMTR-MCNC: 257 MG/DL (ref 70–130)
GLUCOSE SERPL-MCNC: 189 MG/DL (ref 65–99)
HBA1C MFR BLD: 7.2 % (ref 4.8–5.6)
HCO3 BLDA-SCNC: 28.7 MMOL/L (ref 22–28)
HCT VFR BLD AUTO: 31.2 % (ref 37.5–51)
HDLC SERPL-MCNC: 54 MG/DL (ref 40–60)
HEMODILUTION: NO
HGB BLD-MCNC: 10.1 G/DL (ref 13–17.7)
IMM GRANULOCYTES # BLD AUTO: 0.01 10*3/MM3 (ref 0–0.05)
IMM GRANULOCYTES NFR BLD AUTO: 0.2 % (ref 0–0.5)
INR PPP: 1.09 (ref 0.9–1.1)
IRON 24H UR-MRATE: 41 MCG/DL (ref 59–158)
IRON SATN MFR SERPL: 10 % (ref 20–50)
LDLC SERPL CALC-MCNC: 43 MG/DL (ref 0–100)
LDLC/HDLC SERPL: 0.77 {RATIO}
LYMPHOCYTES # BLD AUTO: 1.72 10*3/MM3 (ref 0.7–3.1)
LYMPHOCYTES NFR BLD AUTO: 29.7 % (ref 19.6–45.3)
MAGNESIUM SERPL-MCNC: 2 MG/DL (ref 1.6–2.4)
MCH RBC QN AUTO: 28.8 PG (ref 26.6–33)
MCHC RBC AUTO-ENTMCNC: 32.4 G/DL (ref 31.5–35.7)
MCV RBC AUTO: 88.9 FL (ref 79–97)
MODALITY: ABNORMAL
MONOCYTES # BLD AUTO: 0.48 10*3/MM3 (ref 0.1–0.9)
MONOCYTES NFR BLD AUTO: 8.3 % (ref 5–12)
NEUTROPHILS NFR BLD AUTO: 3.33 10*3/MM3 (ref 1.7–7)
NEUTROPHILS NFR BLD AUTO: 57.5 % (ref 42.7–76)
NRBC BLD AUTO-RTO: 0 /100 WBC (ref 0–0.2)
NT-PROBNP SERPL-MCNC: 287 PG/ML (ref 0–1800)
PCO2 BLDA: 44.4 MM HG (ref 35–45)
PH BLDA: 7.42 PH UNITS (ref 7.35–7.45)
PLATELET # BLD AUTO: 225 10*3/MM3 (ref 140–450)
PMV BLD AUTO: 9.9 FL (ref 6–12)
PO2 BLDA: 77.7 MM HG (ref 80–100)
POTASSIUM SERPL-SCNC: 4.2 MMOL/L (ref 3.5–5.2)
PROT SERPL-MCNC: 6.7 G/DL (ref 6–8.5)
PROTHROMBIN TIME: 14.1 SECONDS (ref 11.7–14.2)
RBC # BLD AUTO: 3.51 10*6/MM3 (ref 4.14–5.8)
SAO2 % BLDCOA: 95.5 % (ref 92–98.5)
SODIUM SERPL-SCNC: 140 MMOL/L (ref 136–145)
TIBC SERPL-MCNC: 392 MCG/DL (ref 298–536)
TOTAL RATE: 16 BREATHS/MINUTE
TRANSFERRIN SERPL-MCNC: 263 MG/DL (ref 200–360)
TRIGL SERPL-MCNC: 97 MG/DL (ref 0–150)
VLDLC SERPL-MCNC: 18 MG/DL (ref 5–40)
WBC NRBC COR # BLD AUTO: 5.79 10*3/MM3 (ref 3.4–10.8)

## 2025-03-24 PROCEDURE — 85025 COMPLETE CBC W/AUTO DIFF WBC: CPT | Performed by: NURSE PRACTITIONER

## 2025-03-24 PROCEDURE — 82803 BLOOD GASES ANY COMBINATION: CPT

## 2025-03-24 PROCEDURE — 80053 COMPREHEN METABOLIC PANEL: CPT | Performed by: NURSE PRACTITIONER

## 2025-03-24 PROCEDURE — C1769 GUIDE WIRE: HCPCS | Performed by: STUDENT IN AN ORGANIZED HEALTH CARE EDUCATION/TRAINING PROGRAM

## 2025-03-24 PROCEDURE — 86900 BLOOD TYPING SEROLOGIC ABO: CPT

## 2025-03-24 PROCEDURE — 80061 LIPID PANEL: CPT | Performed by: NURSE PRACTITIONER

## 2025-03-24 PROCEDURE — B2111ZZ FLUOROSCOPY OF MULTIPLE CORONARY ARTERIES USING LOW OSMOLAR CONTRAST: ICD-10-PCS | Performed by: STUDENT IN AN ORGANIZED HEALTH CARE EDUCATION/TRAINING PROGRAM

## 2025-03-24 PROCEDURE — 93458 L HRT ARTERY/VENTRICLE ANGIO: CPT | Performed by: STUDENT IN AN ORGANIZED HEALTH CARE EDUCATION/TRAINING PROGRAM

## 2025-03-24 PROCEDURE — 85576 BLOOD PLATELET AGGREGATION: CPT | Performed by: NURSE PRACTITIONER

## 2025-03-24 PROCEDURE — 83880 ASSAY OF NATRIURETIC PEPTIDE: CPT | Performed by: NURSE PRACTITIONER

## 2025-03-24 PROCEDURE — 83036 HEMOGLOBIN GLYCOSYLATED A1C: CPT | Performed by: NURSE PRACTITIONER

## 2025-03-24 PROCEDURE — 84466 ASSAY OF TRANSFERRIN: CPT | Performed by: NURSE PRACTITIONER

## 2025-03-24 PROCEDURE — 4A023N7 MEASUREMENT OF CARDIAC SAMPLING AND PRESSURE, LEFT HEART, PERCUTANEOUS APPROACH: ICD-10-PCS | Performed by: STUDENT IN AN ORGANIZED HEALTH CARE EDUCATION/TRAINING PROGRAM

## 2025-03-24 PROCEDURE — 83735 ASSAY OF MAGNESIUM: CPT | Performed by: NURSE PRACTITIONER

## 2025-03-24 PROCEDURE — 99152 MOD SED SAME PHYS/QHP 5/>YRS: CPT | Performed by: STUDENT IN AN ORGANIZED HEALTH CARE EDUCATION/TRAINING PROGRAM

## 2025-03-24 PROCEDURE — 25510000001 IOPAMIDOL PER 1 ML: Performed by: STUDENT IN AN ORGANIZED HEALTH CARE EDUCATION/TRAINING PROGRAM

## 2025-03-24 PROCEDURE — C1894 INTRO/SHEATH, NON-LASER: HCPCS | Performed by: STUDENT IN AN ORGANIZED HEALTH CARE EDUCATION/TRAINING PROGRAM

## 2025-03-24 PROCEDURE — 85610 PROTHROMBIN TIME: CPT | Performed by: NURSE PRACTITIONER

## 2025-03-24 PROCEDURE — 25010000002 HEPARIN (PORCINE) PER 1000 UNITS: Performed by: STUDENT IN AN ORGANIZED HEALTH CARE EDUCATION/TRAINING PROGRAM

## 2025-03-24 PROCEDURE — 25010000002 FENTANYL CITRATE (PF) 50 MCG/ML SOLUTION: Performed by: STUDENT IN AN ORGANIZED HEALTH CARE EDUCATION/TRAINING PROGRAM

## 2025-03-24 PROCEDURE — 86901 BLOOD TYPING SEROLOGIC RH(D): CPT

## 2025-03-24 PROCEDURE — 71046 X-RAY EXAM CHEST 2 VIEWS: CPT

## 2025-03-24 PROCEDURE — 25010000002 LIDOCAINE 2% SOLUTION: Performed by: STUDENT IN AN ORGANIZED HEALTH CARE EDUCATION/TRAINING PROGRAM

## 2025-03-24 PROCEDURE — 83540 ASSAY OF IRON: CPT | Performed by: NURSE PRACTITIONER

## 2025-03-24 PROCEDURE — 82948 REAGENT STRIP/BLOOD GLUCOSE: CPT

## 2025-03-24 PROCEDURE — 63710000001 INSULIN LISPRO (HUMAN) PER 5 UNITS: Performed by: NURSE PRACTITIONER

## 2025-03-24 PROCEDURE — 25010000002 MIDAZOLAM PER 1 MG: Performed by: STUDENT IN AN ORGANIZED HEALTH CARE EDUCATION/TRAINING PROGRAM

## 2025-03-24 PROCEDURE — 63710000001 INSULIN GLARGINE PER 5 UNITS: Performed by: NURSE PRACTITIONER

## 2025-03-24 PROCEDURE — 99222 1ST HOSP IP/OBS MODERATE 55: CPT | Performed by: THORACIC SURGERY (CARDIOTHORACIC VASCULAR SURGERY)

## 2025-03-24 PROCEDURE — 36600 WITHDRAWAL OF ARTERIAL BLOOD: CPT

## 2025-03-24 PROCEDURE — 85730 THROMBOPLASTIN TIME PARTIAL: CPT | Performed by: NURSE PRACTITIONER

## 2025-03-24 RX ORDER — CHLORHEXIDINE GLUCONATE ORAL RINSE 1.2 MG/ML
15 SOLUTION DENTAL EVERY 12 HOURS SCHEDULED
Status: DISCONTINUED | OUTPATIENT
Start: 2025-03-25 | End: 2025-03-25

## 2025-03-24 RX ORDER — ALPRAZOLAM 0.25 MG
0.25 TABLET ORAL EVERY 8 HOURS PRN
Status: DISCONTINUED | OUTPATIENT
Start: 2025-03-24 | End: 2025-03-26 | Stop reason: HOSPADM

## 2025-03-24 RX ORDER — SODIUM CHLORIDE 9 MG/ML
40 INJECTION, SOLUTION INTRAVENOUS AS NEEDED
Status: DISCONTINUED | OUTPATIENT
Start: 2025-03-24 | End: 2025-03-26 | Stop reason: HOSPADM

## 2025-03-24 RX ORDER — TEMAZEPAM 7.5 MG/1
7.5 CAPSULE ORAL NIGHTLY PRN
Status: DISCONTINUED | OUTPATIENT
Start: 2025-03-24 | End: 2025-03-26 | Stop reason: HOSPADM

## 2025-03-24 RX ORDER — ACETAMINOPHEN 325 MG/1
650 TABLET ORAL EVERY 4 HOURS PRN
Status: DISCONTINUED | OUTPATIENT
Start: 2025-03-24 | End: 2025-03-26 | Stop reason: HOSPADM

## 2025-03-24 RX ORDER — ASPIRIN 81 MG/1
81 TABLET, CHEWABLE ORAL DAILY
Status: DISCONTINUED | OUTPATIENT
Start: 2025-03-24 | End: 2025-03-26 | Stop reason: HOSPADM

## 2025-03-24 RX ORDER — SODIUM CHLORIDE 0.9 % (FLUSH) 0.9 %
10 SYRINGE (ML) INJECTION AS NEEDED
Status: CANCELLED | OUTPATIENT
Start: 2025-03-24

## 2025-03-24 RX ORDER — VERAPAMIL HYDROCHLORIDE 2.5 MG/ML
INJECTION, SOLUTION INTRAVENOUS
Status: DISCONTINUED | OUTPATIENT
Start: 2025-03-24 | End: 2025-03-24 | Stop reason: HOSPADM

## 2025-03-24 RX ORDER — IOPAMIDOL 755 MG/ML
INJECTION, SOLUTION INTRAVASCULAR
Status: DISCONTINUED | OUTPATIENT
Start: 2025-03-24 | End: 2025-03-24 | Stop reason: HOSPADM

## 2025-03-24 RX ORDER — SODIUM CHLORIDE 0.9 % (FLUSH) 0.9 %
10 SYRINGE (ML) INJECTION AS NEEDED
Status: DISCONTINUED | OUTPATIENT
Start: 2025-03-24 | End: 2025-03-26 | Stop reason: HOSPADM

## 2025-03-24 RX ORDER — HEPARIN SODIUM 1000 [USP'U]/ML
INJECTION, SOLUTION INTRAVENOUS; SUBCUTANEOUS
Status: DISCONTINUED | OUTPATIENT
Start: 2025-03-24 | End: 2025-03-24 | Stop reason: HOSPADM

## 2025-03-24 RX ORDER — ATORVASTATIN CALCIUM 20 MG/1
40 TABLET, FILM COATED ORAL NIGHTLY
Status: DISCONTINUED | OUTPATIENT
Start: 2025-03-24 | End: 2025-03-26 | Stop reason: HOSPADM

## 2025-03-24 RX ORDER — NICOTINE POLACRILEX 4 MG
15 LOZENGE BUCCAL
Status: DISCONTINUED | OUTPATIENT
Start: 2025-03-24 | End: 2025-03-26 | Stop reason: HOSPADM

## 2025-03-24 RX ORDER — IBUPROFEN 600 MG/1
1 TABLET ORAL
Status: DISCONTINUED | OUTPATIENT
Start: 2025-03-24 | End: 2025-03-26 | Stop reason: HOSPADM

## 2025-03-24 RX ORDER — CARVEDILOL 3.12 MG/1
3.12 TABLET ORAL EVERY 12 HOURS
Status: DISCONTINUED | OUTPATIENT
Start: 2025-03-24 | End: 2025-03-24

## 2025-03-24 RX ORDER — FENTANYL CITRATE 50 UG/ML
INJECTION, SOLUTION INTRAMUSCULAR; INTRAVENOUS
Status: DISCONTINUED | OUTPATIENT
Start: 2025-03-24 | End: 2025-03-24 | Stop reason: HOSPADM

## 2025-03-24 RX ORDER — LIDOCAINE HYDROCHLORIDE 20 MG/ML
INJECTION, SOLUTION INFILTRATION; PERINEURAL
Status: DISCONTINUED | OUTPATIENT
Start: 2025-03-24 | End: 2025-03-24 | Stop reason: HOSPADM

## 2025-03-24 RX ORDER — SODIUM CHLORIDE 0.9 % (FLUSH) 0.9 %
10 SYRINGE (ML) INJECTION EVERY 12 HOURS SCHEDULED
Status: DISCONTINUED | OUTPATIENT
Start: 2025-03-24 | End: 2025-03-26 | Stop reason: HOSPADM

## 2025-03-24 RX ORDER — SODIUM CHLORIDE 9 MG/ML
75 INJECTION, SOLUTION INTRAVENOUS CONTINUOUS
Status: ACTIVE | OUTPATIENT
Start: 2025-03-24 | End: 2025-03-24

## 2025-03-24 RX ORDER — SODIUM CHLORIDE 9 MG/ML
40 INJECTION, SOLUTION INTRAVENOUS AS NEEDED
Status: CANCELLED | OUTPATIENT
Start: 2025-03-24

## 2025-03-24 RX ORDER — CHLORHEXIDINE GLUCONATE ORAL RINSE 1.2 MG/ML
15 SOLUTION DENTAL ONCE
Status: CANCELLED | OUTPATIENT
Start: 2025-03-24 | End: 2025-03-24

## 2025-03-24 RX ORDER — DEXTROSE MONOHYDRATE 25 G/50ML
25 INJECTION, SOLUTION INTRAVENOUS
Status: DISCONTINUED | OUTPATIENT
Start: 2025-03-24 | End: 2025-03-26 | Stop reason: HOSPADM

## 2025-03-24 RX ORDER — METOPROLOL TARTRATE 25 MG/1
12.5 TABLET, FILM COATED ORAL
Status: DISCONTINUED | OUTPATIENT
Start: 2025-03-26 | End: 2025-03-25

## 2025-03-24 RX ORDER — METOPROLOL TARTRATE 25 MG/1
12.5 TABLET, FILM COATED ORAL
Status: CANCELLED | OUTPATIENT
Start: 2025-03-25 | End: 2025-03-26

## 2025-03-24 RX ORDER — CHLORHEXIDINE GLUCONATE 500 MG/1
1 CLOTH TOPICAL EVERY 12 HOURS
Status: DISCONTINUED | OUTPATIENT
Start: 2025-03-25 | End: 2025-03-25

## 2025-03-24 RX ORDER — MIDAZOLAM HYDROCHLORIDE 1 MG/ML
INJECTION, SOLUTION INTRAMUSCULAR; INTRAVENOUS
Status: DISCONTINUED | OUTPATIENT
Start: 2025-03-24 | End: 2025-03-24 | Stop reason: HOSPADM

## 2025-03-24 RX ORDER — CHLORHEXIDINE GLUCONATE ORAL RINSE 1.2 MG/ML
15 SOLUTION DENTAL EVERY 12 HOURS
Status: CANCELLED | OUTPATIENT
Start: 2025-03-24 | End: 2025-03-25

## 2025-03-24 RX ORDER — CARVEDILOL 3.12 MG/1
3.12 TABLET ORAL EVERY 12 HOURS
Status: DISCONTINUED | OUTPATIENT
Start: 2025-03-24 | End: 2025-03-26 | Stop reason: HOSPADM

## 2025-03-24 RX ORDER — CHLORHEXIDINE GLUCONATE 500 MG/1
1 CLOTH TOPICAL EVERY 12 HOURS PRN
Status: CANCELLED | OUTPATIENT
Start: 2025-03-24

## 2025-03-24 RX ORDER — INSULIN LISPRO 100 [IU]/ML
2-9 INJECTION, SOLUTION INTRAVENOUS; SUBCUTANEOUS
Status: DISCONTINUED | OUTPATIENT
Start: 2025-03-24 | End: 2025-03-26 | Stop reason: HOSPADM

## 2025-03-24 RX ORDER — SODIUM CHLORIDE 0.9 % (FLUSH) 0.9 %
10 SYRINGE (ML) INJECTION EVERY 12 HOURS SCHEDULED
Status: CANCELLED | OUTPATIENT
Start: 2025-03-24

## 2025-03-24 RX ADMIN — CARVEDILOL 3.12 MG: 3.12 TABLET, FILM COATED ORAL at 22:33

## 2025-03-24 RX ADMIN — Medication 10 ML: at 22:36

## 2025-03-24 RX ADMIN — ATORVASTATIN CALCIUM 40 MG: 20 TABLET, FILM COATED ORAL at 22:33

## 2025-03-24 RX ADMIN — INSULIN GLARGINE 25 UNITS: 100 INJECTION, SOLUTION SUBCUTANEOUS at 22:33

## 2025-03-24 RX ADMIN — SODIUM CHLORIDE 75 ML/HR: 9 INJECTION, SOLUTION INTRAVENOUS at 08:33

## 2025-03-24 RX ADMIN — INSULIN LISPRO 6 UNITS: 100 INJECTION, SOLUTION INTRAVENOUS; SUBCUTANEOUS at 22:33

## 2025-03-24 NOTE — Clinical Note
Hemostasis started on the right radial artery. R-Band was used in achieving hemostasis. Radial compression device applied to vessel. Hemostasis achieved successfully. Closure device additional comment: 12 ml O2

## 2025-03-24 NOTE — CONSULTS
[facility] Cardiac Surgery      Patient Care Team:  Lavell Dunaway MD as PCP - General (Family Medicine)  Papo Gale MD as Consulting Physician (Hematology and Oncology)  Rachael Reynaga MD as Referring Physician (Hospitalist)    Chief complaint  Chest pain    Subjective     History of Present Illness  Patient is a 74 y.o. male with a past medical history including diabetes type 2, hypertension, hyperlipidemia, hx of pulmonary embolism who was referred to cardiology with complaints of exertional chest pain.  Today he presents for outpatient cardiac catheterization which revealed severe multivessel coronary artery disease, we were asked to see for surgical evaluation.    Review of Systems     Past Medical History:   Diagnosis Date    Arthritis     Diverticulitis     DVT (deep venous thrombosis) 2023    LEFT LEG, LOG ROLLED ONTO ANKLE AND CAUSED BLOOD CLOTS    GERD (gastroesophageal reflux disease)     Hyperlipidemia     Hypertension     Pulmonary embolism 2023    RIGHT    Type 2 diabetes mellitus     Vertigo      Past Surgical History:   Procedure Laterality Date    APPENDECTOMY  1977    COLONOSCOPY      COLONOSCOPY N/A 08/11/2023    Procedure: COLONOSCOPY INTO CECUM;  Surgeon: Jefferson Rouse Jr., MD;  Location: [facility] ENDOSCOPY;  Service: General;  Laterality: N/A;  PRE:  SCREENING /    POST: DIVERTICULOSIS, HEMORRHOIDS    HAND SURGERY Left     KNEE ARTHROSCOPY Right     KNEE CARTILAGE SURGERY Left     ROTATOR CUFF REPAIR Left 2005    TOTAL HIP ARTHROPLASTY Right 2005    TOTAL SHOULDER REVERSE ARTHROPLASTY Left 10/2020     Family History   Problem Relation Age of Onset    Intracerebral hemorrhage Mother     Heart attack Father     Diabetes Father     Diabetes Sister     Diabetes Paternal Grandmother      Social History     Tobacco Use    Smoking status: Never     Passive exposure: Past    Smokeless tobacco: Never   Vaping Use    Vaping status: Never Used   Substance Use Topics    Alcohol use:  Yes     Comment: RARE / 12 beers a yr    Drug use: No     Medications Prior to Admission   Medication Sig Dispense Refill Last Dose/Taking    acetaminophen (TYLENOL) 650 MG 8 hr tablet Take 1 tablet by mouth Every 8 (Eight) Hours As Needed for Mild Pain.   3/23/2025    albuterol sulfate  (90 Base) MCG/ACT inhaler 2 puffs every 6h prn cough/wheezing 6.7 g 0 Past Month    atorvastatin (LIPITOR) 40 MG tablet Take 1 tablet by mouth once daily 90 tablet 0 3/23/2025    BD Pen Needle Meredith 2nd Gen 32G X 4 MM misc 1 each by Other route Daily.   3/23/2025    Cyanocobalamin (VITAMIN B-12 PO) Take 1 tablet by mouth Daily.   3/23/2025    glimepiride (AMARYL) 4 MG tablet Take 1 tablet by mouth 2 (Two) Times a Day.   3/23/2025    hydrochlorothiazide (HYDRODIURIL) 12.5 MG tablet Take 1 tablet by mouth Daily.   3/23/2025    Insulin Glargine (TOUJEO SOLOSTAR SC) Inject 18 Units under the skin into the appropriate area as directed Daily.   3/23/2025    Insulin Lispro (humaLOG) 100 UNIT/ML injection Inject  under the skin into the appropriate area as directed 3 (Three) Times a Day Before Meals.   3/23/2025    irbesartan (AVAPRO) 300 MG tablet Take 1 tablet by mouth once daily 90 tablet 0 3/23/2025    meloxicam (MOBIC) 15 MG tablet Take 1 tablet by mouth once daily 90 tablet 0 3/23/2025    metFORMIN (GLUCOPHAGE) 500 MG tablet Take 2 tablets by mouth 2 (Two) Times a Day.   3/23/2025    Potassium 99 MG tablet Take 99 mg by mouth Daily.   3/23/2025    Prasterone, DHEA, (DHEA PO) Take 1 tablet by mouth Daily.   3/23/2025    Probiotic Product (PROBIOTIC-10 PO) Take 1 tablet by mouth Daily.   3/23/2025    sertraline (ZOLOFT) 25 MG tablet Take 1 tablet by mouth Daily. 90 tablet 2 3/23/2025    Trulicity 0.75 MG/0.5ML solution pen-injector Inject 0.75 mg as directed 1 (One) Time Per Week.   Past Month    TURMERIC PO Take 1 tablet by mouth Daily.   3/23/2025    carbamide peroxide (DEBROX) 6.5 % otic solution Administer 5 drops into the  "left ear 3 (Three) Times a Week. 22 mL 0     esomeprazole (nexIUM) 40 MG capsule TAKE 1 CAPSULE BY MOUTH ONCE DAILY IN THE MORNING BEFORE BREAKFAST 90 capsule 0     fluticasone (FLONASE) 50 MCG/ACT nasal spray 2 sprays to each nostril daily 9.9 mL 0     lidocaine (LIDODERM) 5 % Place 1 patch on the skin as directed by provider Daily. Remove & Discard patch within 12 hours or as directed by MD 30 each 0     meclizine (ANTIVERT) 25 MG tablet Take 1 tablet by mouth 3 (Three) Times a Day As Needed for Dizziness. 30 tablet 0     methocarbamol (ROBAXIN) 750 MG tablet Take 1 tablet by mouth 3 (Three) Times a Day As Needed for Muscle Spasms. 30 tablet 0     terbinafine (lamiSIL) 250 MG tablet Take 1 tablet by mouth Daily.        sodium chloride, 10 mL, Intravenous, Q12H      Allergies:  Patient has no known allergies.    Objective      Vital Signs  Temp:  [97.4 °F (36.3 °C)] 97.4 °F (36.3 °C)  Heart Rate:  [65] 65  Resp:  [15-23] 15  BP: (180)/(69) 180/69    Flowsheet Rows      Flowsheet Row First Filed Value   Admission Height 185.4 cm (73\") Documented at 03/24/2025 0830   Admission Weight 89.4 kg (197 lb) Documented at 03/24/2025 0830          185.4 cm (73\")    Physical Exam  Constitutional:       Appearance: Normal appearance. He is normal weight. He is not ill-appearing.   HENT:      Head: Normocephalic and atraumatic.      Mouth/Throat:      Mouth: Mucous membranes are moist.      Pharynx: No posterior oropharyngeal erythema.   Eyes:      General: No scleral icterus.     Extraocular Movements: Extraocular movements intact.      Pupils: Pupils are equal, round, and reactive to light.   Cardiovascular:      Rate and Rhythm: Normal rate and regular rhythm.      Pulses: Normal pulses.      Heart sounds: Normal heart sounds.   Pulmonary:      Effort: Pulmonary effort is normal. No respiratory distress.      Breath sounds: Normal breath sounds.   Skin:     General: Skin is warm.      Coloration: Skin is not pale. "   Neurological:      Mental Status: He is alert.         Results Review:   Lab Results (last 24 hours)       Procedure Component Value Units Date/Time    POC Glucose Once [[id number]]  (Abnormal) Collected: 03/24/25 0839    Specimen: Blood Updated: 03/24/25 0840     Glucose 144 mg/dL                 Assessment & Plan       * No active hospital problems. *      Assessment & Plan    -Severe multivessel CAD  -Diabetes type 2  -Hypertension  -Hyperlipidemia  -Anemia     Dr. Grissom reviewed and recommends surgical revascularization.  He would like to come back next week. He plans on contacting our office to schedule.    Thank you for allowing us to participate in the care of this patient.      BELKYS Miranda  03/24/25  10:40 EDT

## 2025-03-24 NOTE — DISCHARGE INSTRUCTIONS
[facility]  [address] [place] [address]    Coronary Angiogram (Radial/Ulnar Approach) After Care    Refer to this sheet in the next few weeks. These instructions provide you with information on caring for yourself after your procedure. Your caregiver may also give you more specific instructions. Your treatment has been planned according to current medical practices, but problems sometimes occur. Call your caregiver if you have any problems or questions after your procedure.    Home Care Instructions:  You may shower the day after the procedure. Remove the bandage (dressing) and gently wash the site with plain soap and water. Gently pat the site dry. You may apply a band aid daily for 2 days if desired.    Do not apply powder or lotion to the site.  Do not submerge the affected site in water for 3 to 5 days or until the site is completely healed.   Do not lift, push or pull anything over 5 pounds for 5 days after your procedure or as directed by your physician.  As a reference, a gallon of milk weighs 8 pounds.   Inspect the site at least twice daily. You may notice some bruising at the site and it may be tender for 1 to 2 weeks.     Increase your fluid intake for the next 2 days.    Keep arm elevated for 24 hours. For the remainder of the day, keep your arm in “Pledge of Allegiance” position when up and about.     You may drive 24 hours after the procedure unless otherwise instructed by your caregiver.  Do not operate machinery or power tools for 24 hours.  A responsible adult should be with you for the first 24 hours after you arrive home. Do not make any important legal decisions or sign legal papers for 24 hours.  Do not drink alcohol for 24 hours.    Metformin or any medications containing Metformin should not be taken for 48 hours after your procedure.      Call Your Doctor if:   You have unusual pain at the radial/ulnar (wrist) site.  You have redness, warmth, swelling, or pain at the  radial/ulnar (wrist) site.  You have drainage (other than a small amount of blood on the dressing).  `You have chills or a fever > 101.  Your arm becomes pale or dark, cool, tingly, or numb.  You develop chest pain, shortness of breath, feel faint or pass out.    You have heavy bleeding from the site, hold pressure on the site for 20 minutes.  If the bleeding stops, apply a fresh bandage and call your cardiologist.  However, if you        continue to have bleeding, call 911 and continue to apply pressure to the site.   You have any symptoms of a stroke.  Remember BE FAST  B-balance. Sudden trouble walking or loss of balance.  E-eyes.  Sudden changes in how you see or a sudden onset of a very bad headache.   F-face. Sudden weakness or loss of feeling of the face or facial droop on one side.   A-arms Sudden weakness or numbness in one arm.  One arm drifts down if they are both held out in front of you. This happens suddenly and usually on one side of the body.   S-speech.  Sudden trouble speaking, slurred speech or trouble understanding what are saying.   T-time  Time to call emergency services.  Write down the symptoms and the time they started.

## 2025-03-24 NOTE — CONSULTS
Met with patient's wife to discuss the benefits of cardiac rehab. Patient gone for testing. Wife reports that he is schedule for surgery on Wednesday. Provided phase II information along with the contact information for cardiac rehab here at [facility]. Explained if receiving home health would not be able to attend cardiac rehab until finished with home health. Will follow up with patient after his surgery to explain program.

## 2025-03-24 NOTE — PLAN OF CARE
Goal Outcome Evaluation:              Outcome Evaluation: Pt A&Ox4, vss, no c/o pain. Right radial site w gauze & tegaderm C/D/I and soft. Open heart pre-op in progress. Education given to patient. Safety maintained. Will continue with current POC and update as needed.

## 2025-03-25 ENCOUNTER — APPOINTMENT (OUTPATIENT)
Dept: CARDIOLOGY | Facility: HOSPITAL | Age: 75
End: 2025-03-25
Payer: MEDICARE

## 2025-03-25 PROBLEM — I25.10 CAD (CORONARY ARTERY DISEASE): Status: ACTIVE | Noted: 2025-03-25

## 2025-03-25 LAB
ABO GROUP BLD: NORMAL
ANION GAP SERPL CALCULATED.3IONS-SCNC: 6.5 MMOL/L (ref 5–15)
BH CV VAS PRELIMINARY FINDINGS SCRIPTING: 1
BH CV XLRA MEAS - DIST GSV CALF DIST LEFT: 0.09 CM
BH CV XLRA MEAS - DIST GSV CALF DIST RIGHT: 0.08 CM
BH CV XLRA MEAS - DIST GSV THIGH DIST LEFT: 0.07 CM
BH CV XLRA MEAS - DIST GSV THIGH DIST RIGHT: 0.19 CM
BH CV XLRA MEAS - DIST LSV CALF DIST LEFT: 0.06 CM
BH CV XLRA MEAS - DIST LSV CALF DIST RIGHT: 0.09 CM
BH CV XLRA MEAS - GSV ANKLE DIST LEFT: 0.14 CM
BH CV XLRA MEAS - GSV ANKLE DIST RIGHT: 0.1 CM
BH CV XLRA MEAS - GSV KNEE DIST LEFT: 0.11 CM
BH CV XLRA MEAS - GSV KNEE DIST RIGHT: 0.16 CM
BH CV XLRA MEAS - GSV ORIGIN DIST LEFT: 0.91 CM
BH CV XLRA MEAS - MID GSV CALF LEFT: 0.13 CM
BH CV XLRA MEAS - MID GSV CALF RIGHT: 0.09 CM
BH CV XLRA MEAS - MID GSV THIGH  LEFT: 0.23 CM
BH CV XLRA MEAS - MID LSV CALF DIST RIGHT: 0.09 CM
BH CV XLRA MEAS - PROX GSV CALF DIST LEFT: 0.15 CM
BH CV XLRA MEAS - PROX GSV CALF DIST RIGHT: 0.14 CM
BH CV XLRA MEAS - PROX GSV THIGH  LEFT: 0.29 CM
BH CV XLRA MEAS LEFT DIST CCA EDV: -24.8 CM/SEC
BH CV XLRA MEAS LEFT DIST CCA PSV: -110 CM/SEC
BH CV XLRA MEAS LEFT DIST ICA EDV: -35.3 CM/SEC
BH CV XLRA MEAS LEFT DIST ICA PSV: -112.1 CM/SEC
BH CV XLRA MEAS LEFT ICA/CCA RATIO: 1.18
BH CV XLRA MEAS LEFT MID ICA EDV: -35.8 CM/SEC
BH CV XLRA MEAS LEFT MID ICA PSV: -126.9 CM/SEC
BH CV XLRA MEAS LEFT PROX CCA EDV: 21.1 CM/SEC
BH CV XLRA MEAS LEFT PROX CCA PSV: 95.7 CM/SEC
BH CV XLRA MEAS LEFT PROX ECA EDV: -24.4 CM/SEC
BH CV XLRA MEAS LEFT PROX ECA PSV: -242.7 CM/SEC
BH CV XLRA MEAS LEFT PROX ICA EDV: -29.3 CM/SEC
BH CV XLRA MEAS LEFT PROX ICA PSV: -129.3 CM/SEC
BH CV XLRA MEAS LEFT PROX SCLA PSV: 87.6 CM/SEC
BH CV XLRA MEAS LEFT VERTEBRAL A EDV: 12.8 CM/SEC
BH CV XLRA MEAS LEFT VERTEBRAL A PSV: 54.5 CM/SEC
BH CV XLRA MEAS RIGHT DIST CCA EDV: 16.5 CM/SEC
BH CV XLRA MEAS RIGHT DIST CCA PSV: 69.1 CM/SEC
BH CV XLRA MEAS RIGHT DIST ICA EDV: -14.3 CM/SEC
BH CV XLRA MEAS RIGHT DIST ICA PSV: -44.2 CM/SEC
BH CV XLRA MEAS RIGHT ICA/CCA RATIO: 7.35
BH CV XLRA MEAS RIGHT MID ICA EDV: -22.4 CM/SEC
BH CV XLRA MEAS RIGHT MID ICA PSV: -64.6 CM/SEC
BH CV XLRA MEAS RIGHT PROX CCA EDV: 14.9 CM/SEC
BH CV XLRA MEAS RIGHT PROX CCA PSV: 88.8 CM/SEC
BH CV XLRA MEAS RIGHT PROX ECA EDV: 59.3 CM/SEC
BH CV XLRA MEAS RIGHT PROX ECA PSV: 467.6 CM/SEC
BH CV XLRA MEAS RIGHT PROX ICA EDV: 93.3 CM/SEC
BH CV XLRA MEAS RIGHT PROX ICA PSV: 508 CM/SEC
BH CV XLRA MEAS RIGHT PROX SCLA PSV: 59.5 CM/SEC
BH CV XLRA MEAS RIGHT VERTEBRAL A EDV: 19.2 CM/SEC
BH CV XLRA MEAS RIGHT VERTEBRAL A PSV: 60.9 CM/SEC
BLD GP AB SCN SERPL QL: NEGATIVE
BUN SERPL-MCNC: 17 MG/DL (ref 8–23)
BUN/CREAT SERPL: 15.3 (ref 7–25)
CALCIUM SPEC-SCNC: 9.6 MG/DL (ref 8.6–10.5)
CHLORIDE SERPL-SCNC: 108 MMOL/L (ref 98–107)
CO2 SERPL-SCNC: 27.5 MMOL/L (ref 22–29)
CREAT SERPL-MCNC: 1.11 MG/DL (ref 0.76–1.27)
DEPRECATED RDW RBC AUTO: 53.7 FL (ref 37–54)
EGFRCR SERPLBLD CKD-EPI 2021: 69.2 ML/MIN/1.73
ERYTHROCYTE [DISTWIDTH] IN BLOOD BY AUTOMATED COUNT: 16.3 % (ref 12.3–15.4)
GLUCOSE BLDC GLUCOMTR-MCNC: 115 MG/DL (ref 70–130)
GLUCOSE BLDC GLUCOMTR-MCNC: 117 MG/DL (ref 70–130)
GLUCOSE BLDC GLUCOMTR-MCNC: 217 MG/DL (ref 70–130)
GLUCOSE BLDC GLUCOMTR-MCNC: 315 MG/DL (ref 70–130)
GLUCOSE SERPL-MCNC: 105 MG/DL (ref 65–99)
HCT VFR BLD AUTO: 32.2 % (ref 37.5–51)
HGB BLD-MCNC: 9.9 G/DL (ref 13–17.7)
MCH RBC QN AUTO: 27.7 PG (ref 26.6–33)
MCHC RBC AUTO-ENTMCNC: 30.7 G/DL (ref 31.5–35.7)
MCV RBC AUTO: 89.9 FL (ref 79–97)
PLATELET # BLD AUTO: 234 10*3/MM3 (ref 140–450)
PMV BLD AUTO: 9.6 FL (ref 6–12)
POTASSIUM SERPL-SCNC: 4.6 MMOL/L (ref 3.5–5.2)
QT INTERVAL: 402 MS
QTC INTERVAL: 415 MS
RBC # BLD AUTO: 3.58 10*6/MM3 (ref 4.14–5.8)
RH BLD: POSITIVE
SODIUM SERPL-SCNC: 142 MMOL/L (ref 136–145)
T&S EXPIRATION DATE: NORMAL
WBC NRBC COR # BLD AUTO: 5.46 10*3/MM3 (ref 3.4–10.8)

## 2025-03-25 PROCEDURE — 80048 BASIC METABOLIC PNL TOTAL CA: CPT | Performed by: NURSE PRACTITIONER

## 2025-03-25 PROCEDURE — 93010 ELECTROCARDIOGRAM REPORT: CPT | Performed by: INTERNAL MEDICINE

## 2025-03-25 PROCEDURE — 85027 COMPLETE CBC AUTOMATED: CPT | Performed by: NURSE PRACTITIONER

## 2025-03-25 PROCEDURE — 63710000001 INSULIN GLARGINE PER 5 UNITS: Performed by: NURSE PRACTITIONER

## 2025-03-25 PROCEDURE — 93880 EXTRACRANIAL BILAT STUDY: CPT

## 2025-03-25 PROCEDURE — 86850 RBC ANTIBODY SCREEN: CPT | Performed by: NURSE PRACTITIONER

## 2025-03-25 PROCEDURE — 93970 EXTREMITY STUDY: CPT | Performed by: SURGERY

## 2025-03-25 PROCEDURE — 86901 BLOOD TYPING SEROLOGIC RH(D): CPT | Performed by: NURSE PRACTITIONER

## 2025-03-25 PROCEDURE — 86923 COMPATIBILITY TEST ELECTRIC: CPT

## 2025-03-25 PROCEDURE — 82948 REAGENT STRIP/BLOOD GLUCOSE: CPT

## 2025-03-25 PROCEDURE — 63710000001 INSULIN LISPRO (HUMAN) PER 5 UNITS: Performed by: NURSE PRACTITIONER

## 2025-03-25 PROCEDURE — 93880 EXTRACRANIAL BILAT STUDY: CPT | Performed by: SURGERY

## 2025-03-25 PROCEDURE — 86900 BLOOD TYPING SEROLOGIC ABO: CPT | Performed by: NURSE PRACTITIONER

## 2025-03-25 PROCEDURE — 93005 ELECTROCARDIOGRAM TRACING: CPT | Performed by: THORACIC SURGERY (CARDIOTHORACIC VASCULAR SURGERY)

## 2025-03-25 PROCEDURE — 99232 SBSQ HOSP IP/OBS MODERATE 35: CPT | Performed by: STUDENT IN AN ORGANIZED HEALTH CARE EDUCATION/TRAINING PROGRAM

## 2025-03-25 PROCEDURE — 93970 EXTREMITY STUDY: CPT

## 2025-03-25 RX ORDER — PANTOPRAZOLE SODIUM 40 MG/1
40 TABLET, DELAYED RELEASE ORAL DAILY
Status: DISCONTINUED | OUTPATIENT
Start: 2025-03-25 | End: 2025-03-26 | Stop reason: HOSPADM

## 2025-03-25 RX ADMIN — CARVEDILOL 3.12 MG: 3.12 TABLET, FILM COATED ORAL at 22:31

## 2025-03-25 RX ADMIN — PANTOPRAZOLE SODIUM 40 MG: 40 TABLET, DELAYED RELEASE ORAL at 12:18

## 2025-03-25 RX ADMIN — CARVEDILOL 3.12 MG: 3.12 TABLET, FILM COATED ORAL at 08:19

## 2025-03-25 RX ADMIN — INSULIN LISPRO 4 UNITS: 100 INJECTION, SOLUTION INTRAVENOUS; SUBCUTANEOUS at 22:31

## 2025-03-25 RX ADMIN — Medication 10 ML: at 22:32

## 2025-03-25 RX ADMIN — INSULIN GLARGINE 25 UNITS: 100 INJECTION, SOLUTION SUBCUTANEOUS at 22:32

## 2025-03-25 RX ADMIN — ATORVASTATIN CALCIUM 40 MG: 20 TABLET, FILM COATED ORAL at 22:31

## 2025-03-25 RX ADMIN — Medication 10 ML: at 09:00

## 2025-03-25 RX ADMIN — ASPIRIN 81 MG CHEWABLE TABLET 81 MG: 81 TABLET CHEWABLE at 08:19

## 2025-03-25 RX ADMIN — TEMAZEPAM 7.5 MG: 7.5 CAPSULE ORAL at 22:37

## 2025-03-25 RX ADMIN — APIXABAN 10 MG: 5 TABLET, FILM COATED ORAL at 22:31

## 2025-03-25 RX ADMIN — INSULIN LISPRO 7 UNITS: 100 INJECTION, SOLUTION INTRAVENOUS; SUBCUTANEOUS at 12:17

## 2025-03-25 NOTE — PROGRESS NOTES
" LOS: 1 day   Patient Care Team:  Lavell Dunaway MD as PCP - General (Family Medicine)  Papo Gale MD as Consulting Physician (Hematology and Oncology)  Rachael Reynaga MD as Referring Physician (Hospitalist)    Chief Complaint: post op follow-up    Subjective  Feeling okay. A little nervous about surgery. No current chest pain.  Vital Signs  Temp:  [97.7 °F (36.5 °C)-98 °F (36.7 °C)] 98 °F (36.7 °C)  Heart Rate:  [60-68] 68  Resp:  [15-23] 16  BP: (130-167)/(57-80) 141/70  Body mass index is 26.6 kg/m².    Intake/Output Summary (Last 24 hours) at 3/25/2025 0932  Last data filed at 3/25/2025 0821  Gross per 24 hour   Intake 960 ml   Output --   Net 960 ml     I/O this shift:  In: 240 [P.O.:240]  Out: -              03/24/25  0830 03/25/25  0500   Weight: 89.4 kg (197 lb) 91.4 kg (201 lb 9.6 oz)         Objective:  Vital signs: (most recent): Blood pressure 141/70, pulse 68, temperature 98 °F (36.7 °C), temperature source Oral, resp. rate 16, height 185.4 cm (73\"), weight 91.4 kg (201 lb 9.6 oz), SpO2 96%.                  Results Review:        WBC WBC   Date Value Ref Range Status   03/25/2025 5.46 3.40 - 10.80 10*3/mm3 Final   03/24/2025 5.79 3.40 - 10.80 10*3/mm3 Final      HGB Hemoglobin   Date Value Ref Range Status   03/25/2025 9.9 (L) 13.0 - 17.7 g/dL Final   03/24/2025 10.1 (L) 13.0 - 17.7 g/dL Final      HCT Hematocrit   Date Value Ref Range Status   03/25/2025 32.2 (L) 37.5 - 51.0 % Final   03/24/2025 31.2 (L) 37.5 - 51.0 % Final      Platelets Platelets   Date Value Ref Range Status   03/25/2025 234 140 - 450 10*3/mm3 Final   03/24/2025 225 140 - 450 10*3/mm3 Final        PT/INR:    Protime   Date Value Ref Range Status   03/24/2025 14.1 11.7 - 14.2 Seconds Final   /  INR   Date Value Ref Range Status   03/24/2025 1.09 0.90 - 1.10 Final       Sodium Sodium   Date Value Ref Range Status   03/25/2025 142 136 - 145 mmol/L Final   03/24/2025 140 136 - 145 mmol/L Final      Potassium Potassium "   Date Value Ref Range Status   03/25/2025 4.6 3.5 - 5.2 mmol/L Final   03/24/2025 4.2 3.5 - 5.2 mmol/L Final      Chloride Chloride   Date Value Ref Range Status   03/25/2025 108 (H) 98 - 107 mmol/L Final   03/24/2025 106 98 - 107 mmol/L Final      Bicarbonate CO2   Date Value Ref Range Status   03/25/2025 27.5 22.0 - 29.0 mmol/L Final   03/24/2025 25.0 22.0 - 29.0 mmol/L Final      BUN BUN   Date Value Ref Range Status   03/25/2025 17 8 - 23 mg/dL Final   03/24/2025 20 8 - 23 mg/dL Final      Creatinine Creatinine   Date Value Ref Range Status   03/25/2025 1.11 0.76 - 1.27 mg/dL Final   03/24/2025 0.96 0.76 - 1.27 mg/dL Final      Calcium Calcium   Date Value Ref Range Status   03/25/2025 9.6 8.6 - 10.5 mg/dL Final   03/24/2025 9.2 8.6 - 10.5 mg/dL Final      Magnesium Magnesium   Date Value Ref Range Status   03/24/2025 2.0 1.6 - 2.4 mg/dL Final          aspirin, 81 mg, Oral, Daily  atorvastatin, 40 mg, Oral, Nightly  carvedilol, 3.125 mg, Oral, Q12H  [START ON 3/26/2025] ceFAZolin, 2,000 mg, Intravenous, On Call to OR  chlorhexidine, 15 mL, Mouth/Throat, Q12H  Chlorhexidine Gluconate Cloth, 1 Application, Topical, Q12H  insulin glargine, 25 Units, Subcutaneous, Nightly  insulin lispro, 2-9 Units, Subcutaneous, 4x Daily AC & at Bedtime  [START ON 3/26/2025] metoprolol tartrate, 12.5 mg, Oral, On Call to OR  mupirocin, 1 Application, Each Nare, Q12H  sodium chloride, 10 mL, Intravenous, Q12H                 * No active hospital problems. *      Assessment & Plan    -Severe multivessel CAD  -Diabetes type 2  -Hypertension  -Hyperlipidemia  -Anemia     Denies any chest pain.  Questions answered and verbalized understanding.  Preoperative work up on going.   Hgb 9 this morning, iron profile is a little low--will discuss with Dr. Grissom.  Plan for OR tomorrow with Dr. Praveena Jeter, BELKYS  03/25/25  09:32 EDT

## 2025-03-25 NOTE — PROGRESS NOTES
On his vein mapping he was noted to have a right acute thrombus in his right greater saphenous vein from the thigh to the saphenofemoral femoral junction.  He has a history of pulmonary thromboembolism in the past.  He just traveled to [place] and I suspect this is secondary to the trip and the plane flight.  I spoke with Dr. Cline, we will cancel surgery and place him on Eliquis.  With the recurrent episodes he should have hematology see.  He is also anemic.  I spoke with Dr. Fisher also.

## 2025-03-25 NOTE — PROGRESS NOTES
"    Patient Name: Marysol Bennett  :1950  74 y.o.      Patient Care Team:  Lavell Dunaway MD as PCP - General (Family Medicine)  Papo Gale MD as Consulting Physician (Hematology and Oncology)  Rachael Reynaga MD as Referring Physician (Hospitalist)    Chief Complaint:   Multivessel CAD    Interval History:   NAEO, feels well.     Objective   Vital Signs  Temp:  [97.7 °F (36.5 °C)-98 °F (36.7 °C)] 98 °F (36.7 °C)  Heart Rate:  [60-68] 68  Resp:  [15-23] 16  BP: (130-167)/(57-80) 141/70    Intake/Output Summary (Last 24 hours) at 3/25/2025 0849  Last data filed at 3/25/2025 0821  Gross per 24 hour   Intake 960 ml   Output --   Net 960 ml     Flowsheet Rows      Flowsheet Row First Filed Value   Admission Height 185.4 cm (73\") Documented at 2025 0830   Admission Weight 89.4 kg (197 lb) Documented at 2025 0830            GEN: no distress, alert and oriented  HEENT: NACT, EOMI, moist mucous membranes  Lungs: CTAB, no wheezes, rales or rhonchi  CV: normal rate, regular rhythm, normal S1, S2, no murmurs, RRA access site cdi, no hematoma, pulse 2+  Abdomen: soft, nontender, nondistended, NABS  Extremities: no edema  Skin: no rash, warm, dry  Heme/Lymph: no bruising  Psych: organized thought, normal behavior and affect    Results Review:    Results from last 7 days   Lab Units 25  0302   SODIUM mmol/L 142   POTASSIUM mmol/L 4.6   CHLORIDE mmol/L 108*   CO2 mmol/L 27.5   BUN mg/dL 17   CREATININE mg/dL 1.11   GLUCOSE mg/dL 105*   CALCIUM mg/dL 9.6         Results from last 7 days   Lab Units 25  0302   WBC 10*3/mm3 5.46   HEMOGLOBIN g/dL 9.9*   HEMATOCRIT % 32.2*   PLATELETS 10*3/mm3 234     Results from last 7 days   Lab Units 25  1900   INR  1.09   APTT seconds 26.7     Results from last 7 days   Lab Units 25  1900   MAGNESIUM mg/dL 2.0     Results from last 7 days   Lab Units 25  1900   CHOLESTEROL mg/dL 115   TRIGLYCERIDES mg/dL 97   HDL CHOL mg/dL 54 "   LDL CHOL mg/dL 43               Medication Review:   aspirin, 81 mg, Oral, Daily  atorvastatin, 40 mg, Oral, Nightly  carvedilol, 3.125 mg, Oral, Q12H  [START ON 3/26/2025] ceFAZolin, 2,000 mg, Intravenous, On Call to OR  chlorhexidine, 15 mL, Mouth/Throat, Q12H  Chlorhexidine Gluconate Cloth, 1 Application, Topical, Q12H  insulin glargine, 25 Units, Subcutaneous, Nightly  insulin lispro, 2-9 Units, Subcutaneous, 4x Daily AC & at Bedtime  [START ON 3/26/2025] metoprolol tartrate, 12.5 mg, Oral, On Call to OR  mupirocin, 1 Application, Each Nare, Q12H  sodium chloride, 10 mL, Intravenous, Q12H              Assessment & Plan   #Multivessel CAD  #Hypertension  #Hyperlipidemia  #Diabetes    74-year-old man with hypertension, hyperlipidemia, diabetes, history of provoked PE who presented with exertional left arm and jaw discomfort.  Stress echo revealed apical and anterolateral wall ischemia despite submaximal heart rate as well as abnormal EKG. He was found to have severe multivessel CAD including mid LAD, OM1, and RCA.  Plan is for CABG tomorrow.    - continue atorvastatin 40mg daily, carvedilol 3.125 mg BID    Plan for OR tomorrow.    Jai Fisher MD, FACC, [facility]  03/25/25  08:49 EDT

## 2025-03-26 ENCOUNTER — READMISSION MANAGEMENT (OUTPATIENT)
Dept: CALL CENTER | Facility: HOSPITAL | Age: 75
End: 2025-03-26
Payer: MEDICARE

## 2025-03-26 VITALS
SYSTOLIC BLOOD PRESSURE: 161 MMHG | DIASTOLIC BLOOD PRESSURE: 72 MMHG | HEART RATE: 61 BPM | HEIGHT: 73 IN | TEMPERATURE: 98 F | BODY MASS INDEX: 26.72 KG/M2 | RESPIRATION RATE: 16 BRPM | OXYGEN SATURATION: 96 % | WEIGHT: 201.6 LBS

## 2025-03-26 PROBLEM — I80.01 THROMBOPHLEBITIS OF SUPERFICIAL VEINS OF RIGHT LOWER EXTREMITY: Status: ACTIVE | Noted: 2025-03-26

## 2025-03-26 LAB
ANION GAP SERPL CALCULATED.3IONS-SCNC: 9.4 MMOL/L (ref 5–15)
BASOPHILS # BLD AUTO: 0.04 10*3/MM3 (ref 0–0.2)
BASOPHILS NFR BLD AUTO: 0.6 % (ref 0–1.5)
BUN SERPL-MCNC: 22 MG/DL (ref 8–23)
BUN/CREAT SERPL: 21.6 (ref 7–25)
CALCIUM SPEC-SCNC: 9.4 MG/DL (ref 8.6–10.5)
CHLORIDE SERPL-SCNC: 102 MMOL/L (ref 98–107)
CO2 SERPL-SCNC: 24.6 MMOL/L (ref 22–29)
CREAT SERPL-MCNC: 1.02 MG/DL (ref 0.76–1.27)
DEPRECATED RDW RBC AUTO: 51.3 FL (ref 37–54)
EGFRCR SERPLBLD CKD-EPI 2021: 76.6 ML/MIN/1.73
EOSINOPHIL # BLD AUTO: 0.19 10*3/MM3 (ref 0–0.4)
EOSINOPHIL NFR BLD AUTO: 3 % (ref 0.3–6.2)
ERYTHROCYTE [DISTWIDTH] IN BLOOD BY AUTOMATED COUNT: 15.8 % (ref 12.3–15.4)
GLUCOSE BLDC GLUCOMTR-MCNC: 188 MG/DL (ref 70–130)
GLUCOSE BLDC GLUCOMTR-MCNC: 268 MG/DL (ref 70–130)
GLUCOSE SERPL-MCNC: 275 MG/DL (ref 65–99)
HCT VFR BLD AUTO: 31.9 % (ref 37.5–51)
HGB BLD-MCNC: 10 G/DL (ref 13–17.7)
IMM GRANULOCYTES # BLD AUTO: 0.02 10*3/MM3 (ref 0–0.05)
IMM GRANULOCYTES NFR BLD AUTO: 0.3 % (ref 0–0.5)
LYMPHOCYTES # BLD AUTO: 1.11 10*3/MM3 (ref 0.7–3.1)
LYMPHOCYTES NFR BLD AUTO: 17.7 % (ref 19.6–45.3)
MCH RBC QN AUTO: 28 PG (ref 26.6–33)
MCHC RBC AUTO-ENTMCNC: 31.3 G/DL (ref 31.5–35.7)
MCV RBC AUTO: 89.4 FL (ref 79–97)
MONOCYTES # BLD AUTO: 0.5 10*3/MM3 (ref 0.1–0.9)
MONOCYTES NFR BLD AUTO: 8 % (ref 5–12)
NEUTROPHILS NFR BLD AUTO: 4.4 10*3/MM3 (ref 1.7–7)
NEUTROPHILS NFR BLD AUTO: 70.4 % (ref 42.7–76)
NRBC BLD AUTO-RTO: 0 /100 WBC (ref 0–0.2)
PLATELET # BLD AUTO: 252 10*3/MM3 (ref 140–450)
PMV BLD AUTO: 9.9 FL (ref 6–12)
POTASSIUM SERPL-SCNC: 4.9 MMOL/L (ref 3.5–5.2)
RBC # BLD AUTO: 3.57 10*6/MM3 (ref 4.14–5.8)
SODIUM SERPL-SCNC: 136 MMOL/L (ref 136–145)
WBC NRBC COR # BLD AUTO: 6.26 10*3/MM3 (ref 3.4–10.8)

## 2025-03-26 PROCEDURE — 80048 BASIC METABOLIC PNL TOTAL CA: CPT | Performed by: STUDENT IN AN ORGANIZED HEALTH CARE EDUCATION/TRAINING PROGRAM

## 2025-03-26 PROCEDURE — 85670 THROMBIN TIME PLASMA: CPT | Performed by: INTERNAL MEDICINE

## 2025-03-26 PROCEDURE — 86147 CARDIOLIPIN ANTIBODY EA IG: CPT | Performed by: INTERNAL MEDICINE

## 2025-03-26 PROCEDURE — 63710000001 INSULIN LISPRO (HUMAN) PER 5 UNITS: Performed by: NURSE PRACTITIONER

## 2025-03-26 PROCEDURE — 85303 CLOT INHIBIT PROT C ACTIVITY: CPT | Performed by: INTERNAL MEDICINE

## 2025-03-26 PROCEDURE — 81241 F5 GENE: CPT | Performed by: INTERNAL MEDICINE

## 2025-03-26 PROCEDURE — 85613 RUSSELL VIPER VENOM DILUTED: CPT | Performed by: INTERNAL MEDICINE

## 2025-03-26 PROCEDURE — 86146 BETA-2 GLYCOPROTEIN ANTIBODY: CPT | Performed by: INTERNAL MEDICINE

## 2025-03-26 PROCEDURE — 85732 THROMBOPLASTIN TIME PARTIAL: CPT | Performed by: INTERNAL MEDICINE

## 2025-03-26 PROCEDURE — 82948 REAGENT STRIP/BLOOD GLUCOSE: CPT

## 2025-03-26 PROCEDURE — 85705 THROMBOPLASTIN INHIBITION: CPT | Performed by: INTERNAL MEDICINE

## 2025-03-26 PROCEDURE — 85305 CLOT INHIBIT PROT S TOTAL: CPT | Performed by: INTERNAL MEDICINE

## 2025-03-26 PROCEDURE — 99223 1ST HOSP IP/OBS HIGH 75: CPT | Performed by: INTERNAL MEDICINE

## 2025-03-26 PROCEDURE — 99222 1ST HOSP IP/OBS MODERATE 55: CPT | Performed by: SURGERY

## 2025-03-26 PROCEDURE — 85300 ANTITHROMBIN III ACTIVITY: CPT | Performed by: INTERNAL MEDICINE

## 2025-03-26 PROCEDURE — 85025 COMPLETE CBC W/AUTO DIFF WBC: CPT | Performed by: STUDENT IN AN ORGANIZED HEALTH CARE EDUCATION/TRAINING PROGRAM

## 2025-03-26 PROCEDURE — 99239 HOSP IP/OBS DSCHRG MGMT >30: CPT | Performed by: STUDENT IN AN ORGANIZED HEALTH CARE EDUCATION/TRAINING PROGRAM

## 2025-03-26 PROCEDURE — 81240 F2 GENE: CPT | Performed by: INTERNAL MEDICINE

## 2025-03-26 PROCEDURE — 85306 CLOT INHIBIT PROT S FREE: CPT | Performed by: INTERNAL MEDICINE

## 2025-03-26 RX ADMIN — INSULIN LISPRO 2 UNITS: 100 INJECTION, SOLUTION INTRAVENOUS; SUBCUTANEOUS at 07:08

## 2025-03-26 RX ADMIN — ASPIRIN 81 MG CHEWABLE TABLET 81 MG: 81 TABLET CHEWABLE at 07:52

## 2025-03-26 RX ADMIN — PANTOPRAZOLE SODIUM 40 MG: 40 TABLET, DELAYED RELEASE ORAL at 07:53

## 2025-03-26 RX ADMIN — APIXABAN 10 MG: 5 TABLET, FILM COATED ORAL at 07:52

## 2025-03-26 RX ADMIN — Medication 10 ML: at 07:55

## 2025-03-26 RX ADMIN — INSULIN LISPRO 6 UNITS: 100 INJECTION, SOLUTION INTRAVENOUS; SUBCUTANEOUS at 11:27

## 2025-03-26 RX ADMIN — CARVEDILOL 3.12 MG: 3.12 TABLET, FILM COATED ORAL at 07:52

## 2025-03-26 NOTE — CONSULTS
Subjective     REASON FOR CONSULTATION:    Evaluation and management for right lower extremity superficial thrombophlebitis                             REQUESTING PHYSICIAN:  MD Regulo    RECORDS OBTAINED:  Records of the patients history including those obtained from the referring provider were reviewed and summarized in detail.    HISTORY OF PRESENT ILLNESS:  The patient is a 74 y.o. year old male with medical history significant for CAD, DM2, history of LLE DVT/PE (2023), hypertension and dyslipidemia who was admitted after cardiac catheterization revealing severe multivessel coronary artery disease.  He was then seen by Dr. Grissom, cardiothoracic surgery for possible CABG procedure.      A US Doppler of his legs was performed on 3/25/2025 for vein mapping.  The Doppler noted acute superficial vein thrombosis involving the right great saphenous vein from mid thigh to the saphenofemoral junction.  There was chronic superficial vein thrombosis involving the right and left small saphenous veins.     Patient has a history of LLE DVT and PE diagnosed in June 2023 after having an injury to his left foot.  He was seen by Dr. Gale in our practice and received anticoagulation with Eliquis x 6 months.    Patient has been seen by Dr. Cline, vascular surgery and started on therapeutic anticoagulation with Eliquis due to high risk for DVT propagation.    Given the second episode of thrombosis-hematology has been consulted for further evaluation and management.      Patient reports of having two recent flights trips ([place], each 2-3 hours long) in last 1 month.  No injuries to his leg.  States he maintains active lifestyle.  No history of tobacco or alcohol abuse.  No family history of DVT or PEs or malignancy.  Had a colonoscopy in 2023 which was negative.      Past Medical History:   Diagnosis Date    Arthritis     Diverticulitis     DVT (deep venous thrombosis) 2023    LEFT LEG, LOG ROLLED  ONTO ANKLE AND CAUSED BLOOD CLOTS    GERD (gastroesophageal reflux disease)     Hyperlipidemia     Hypertension     Pulmonary embolism 2023    RIGHT    Type 2 diabetes mellitus     Vertigo         Past Surgical History:   Procedure Laterality Date    APPENDECTOMY  1977    CARDIAC CATHETERIZATION N/A 3/24/2025    Procedure: Coronary angiography;  Surgeon: Jai Black MD;  Location:  [facility];  Service: Cardiovascular;  Laterality: N/A;    CARDIAC CATHETERIZATION N/A 3/24/2025    Procedure: Left heart cath;  Surgeon: Jai Black MD;  Location:  [facility];  Service: Cardiovascular;  Laterality: N/A;    CARDIAC CATHETERIZATION N/A 3/24/2025    Procedure: Resting Full Cycle Ratio-Procedure not performed;  Surgeon: Jai Black MD;  Location:  [facility];  Service: Cardiovascular;  Laterality: N/A;    COLONOSCOPY      COLONOSCOPY N/A 08/11/2023    Procedure: COLONOSCOPY INTO CECUM;  Surgeon: Jefferson Rouse Jr., MD;  Location: [facility] ENDOSCOPY;  Service: General;  Laterality: N/A;  PRE:  SCREENING /    POST: DIVERTICULOSIS, HEMORRHOIDS    HAND SURGERY Left     KNEE ARTHROSCOPY Right     KNEE CARTILAGE SURGERY Left     ROTATOR CUFF REPAIR Left 2005    TOTAL HIP ARTHROPLASTY Right 2005    TOTAL SHOULDER REVERSE ARTHROPLASTY Left 10/2020        No current facility-administered medications on file prior to encounter.     Current Outpatient Medications on File Prior to Encounter   Medication Sig Dispense Refill    acetaminophen (TYLENOL) 650 MG 8 hr tablet Take 1 tablet by mouth Every 8 (Eight) Hours As Needed for Mild Pain.      albuterol sulfate  (90 Base) MCG/ACT inhaler 2 puffs every 6h prn cough/wheezing 6.7 g 0    atorvastatin (LIPITOR) 40 MG tablet Take 1 tablet by mouth once daily 90 tablet 0    BD Pen Needle Meredith 2nd Gen 32G X 4 MM misc 1 each by Other route Daily.      Cyanocobalamin (VITAMIN B-12 PO) Take 1 tablet by mouth Daily.      glimepiride  (AMARYL) 4 MG tablet Take 1 tablet by mouth 2 (Two) Times a Day.      hydrochlorothiazide (HYDRODIURIL) 12.5 MG tablet Take 1 tablet by mouth Daily.      Insulin Glargine (TOUJEO SOLOSTAR SC) Inject 18 Units under the skin into the appropriate area as directed Daily.      Insulin Lispro (humaLOG) 100 UNIT/ML injection Inject  under the skin into the appropriate area as directed 3 (Three) Times a Day Before Meals.      irbesartan (AVAPRO) 300 MG tablet Take 1 tablet by mouth once daily 90 tablet 0    meloxicam (MOBIC) 15 MG tablet Take 1 tablet by mouth once daily 90 tablet 0    Potassium 99 MG tablet Take 99 mg by mouth Daily.      Prasterone, DHEA, (DHEA PO) Take 1 tablet by mouth Daily.      Probiotic Product (PROBIOTIC-10 PO) Take 1 tablet by mouth Daily.      sertraline (ZOLOFT) 25 MG tablet Take 1 tablet by mouth Daily. 90 tablet 2    Trulicity 0.75 MG/0.5ML solution pen-injector Inject 0.75 mg as directed 1 (One) Time Per Week.      TURMERIC PO Take 1 tablet by mouth Daily.      carbamide peroxide (DEBROX) 6.5 % otic solution Administer 5 drops into the left ear 3 (Three) Times a Week. 22 mL 0    esomeprazole (nexIUM) 40 MG capsule TAKE 1 CAPSULE BY MOUTH ONCE DAILY IN THE MORNING BEFORE BREAKFAST 90 capsule 0    fluticasone (FLONASE) 50 MCG/ACT nasal spray 2 sprays to each nostril daily 9.9 mL 0    lidocaine (LIDODERM) 5 % Place 1 patch on the skin as directed by provider Daily. Remove & Discard patch within 12 hours or as directed by MD 30 each 0    meclizine (ANTIVERT) 25 MG tablet Take 1 tablet by mouth 3 (Three) Times a Day As Needed for Dizziness. 30 tablet 0    methocarbamol (ROBAXIN) 750 MG tablet Take 1 tablet by mouth 3 (Three) Times a Day As Needed for Muscle Spasms. 30 tablet 0    terbinafine (lamiSIL) 250 MG tablet Take 1 tablet by mouth Daily.          ALLERGIES:  No Known Allergies     Social History     Socioeconomic History    Marital status:      Spouse name: Nivia    Number of  children: 2   Tobacco Use    Smoking status: Never     Passive exposure: Past    Smokeless tobacco: Never   Vaping Use    Vaping status: Never Used   Substance and Sexual Activity    Alcohol use: Yes     Comment: RARE / 12 beers a yr    Drug use: No    Sexual activity: Defer        Family History   Problem Relation Age of Onset    Intracerebral hemorrhage Mother     Heart attack Father     Diabetes Father     Diabetes Sister     Diabetes Paternal Grandmother         Review of Systems   As per HPI    Objective     Vitals:    03/25/25 1425 03/25/25 1956 03/26/25 0359 03/26/25 0750   BP: 144/69 126/71 133/68 146/70   BP Location: Right arm Right arm Right arm Left arm   Patient Position: Sitting Sitting Lying Lying   Pulse: 59 73 60 65   Resp: 16 16 16 16   Temp: 98.1 °F (36.7 °C) 97.7 °F (36.5 °C) 97.9 °F (36.6 °C) 97.8 °F (36.6 °C)   TempSrc: Oral Oral Oral Oral   SpO2:       Weight:       Height:             12/11/2023    12:56 PM   Current Status   ECOG score 0       Physical Exam    CONSTITUTIONAL:  Vital signs reviewed.  No distress, looks comfortable.  EYES:  Conjunctivae and lids unremarkable.    EARS,NOSE,MOUTH,THROAT:  Ears and nose appear unremarkable.    RESPIRATORY:  Normal respiratory effort.  Lungs clear to auscultation bilaterally.  CARDIOVASCULAR:  Normal S1, S2.  No murmurs rubs or gallops.  No significant lower extremity edema.  GASTROINTESTINAL: Abdomen appears unremarkable.  Nondistended   LYMPHATIC:  No cervical, supraclavicular lymphadenopathy.  SKIN:  Warm.  No rashes.  PSYCHIATRIC:  Normal judgment and insight.  Normal mood and affect.  NEURO: AAOx3, no obvious focal deficits.      RECENT LABS:  Hematology WBC   Date Value Ref Range Status   03/26/2025 6.26 3.40 - 10.80 10*3/mm3 Final     RBC   Date Value Ref Range Status   03/26/2025 3.57 (L) 4.14 - 5.80 10*6/mm3 Final     Hemoglobin   Date Value Ref Range Status   03/26/2025 10.0 (L) 13.0 - 17.7 g/dL Final     Hematocrit   Date Value Ref  Range Status   03/26/2025 31.9 (L) 37.5 - 51.0 % Final     Platelets   Date Value Ref Range Status   03/26/2025 252 140 - 450 10*3/mm3 Final          Assessment & Plan   Patient is a 74-year-old male with:    # Acute RLE superficial thrombophlebitis of the great saphenous vein:  Noted on the Doppler legs performed for vein mapping prior to the CABG procedure on 3/25/2025.   Patient is asymptomatic.   The Doppler noted acute superficial vein thrombosis involving the right great saphenous vein from mid thigh to the saphenofemoral junction.  There was chronic superficial vein thrombosis involving the right and left small saphenous veins.   Patient has a history of LLE DVT and PE diagnosed in June 2023 after having an injury to his left foot.  He was seen by Dr. Gale in our practice and received anticoagulation with Eliquis x 6 months.   Patient has been seen by Dr. Cline, vascular surgery this admission and started on therapeutic anticoagulation with Eliquis due to high risk for DVT propagation.   This appears to be provoked thrombosis as well as patient had two recent flights trips ([place], each [place]-3 hours long) with in last 1 month.  No injuries to his leg.  States he maintains active lifestyle.  No history of tobacco or alcohol abuse.  No family history of DVT or PEs or malignancy.  Had a colonoscopy in 2023 which was negative.  However, given second episode-will perform hypercoagulable workup.  Check factor V Leiden, prothrombin gene mutation, Antithrombin III, protein C, S/S levels and antiphospholipid panel today.  Continue therapeutic anticoagulation for at least 6 weeks.  Will plan to follow-up as outpatient.    # CAD: Management per cardiology and cardiothoracic surgery    # DM 2, hypertension and dyslipidemia    Recommendations:  -Appear to be provoked superficial thrombosis  -Agree with therapeutic anticoagulation with Eliquis per vascular surgery  -Perform hypercoagulable  workup  -Will follow-up as outpatient      Please call us with any questions        I spent 82 minutes on this encounter, before, during & after the visit evaluating the patient, reviewing records and writing orders.

## 2025-03-26 NOTE — PROGRESS NOTES
"    Patient Name: Marysol Bennett  :1950  74 y.o.      Patient Care Team:  Lavell Dunaway MD as PCP - General (Family Medicine)  Papo Gale MD as Consulting Physician (Hematology and Oncology)  Rachael Reynaga MD as Referring Physician (Hospitalist)    Chief Complaint:   Multivessel CAD    Interval History:   NAEO, feels well.     Objective   Vital Signs  Temp:  [97.7 °F (36.5 °C)-98.1 °F (36.7 °C)] 97.8 °F (36.6 °C)  Heart Rate:  [59-73] 65  Resp:  [16] 16  BP: (126-146)/(68-71) 146/70    Intake/Output Summary (Last 24 hours) at 3/26/2025 0849  Last data filed at 3/25/2025 1807  Gross per 24 hour   Intake 240 ml   Output --   Net 240 ml     Flowsheet Rows      Flowsheet Row First Filed Value   Admission Height 185.4 cm (73\") Documented at 2025 0830   Admission Weight 89.4 kg (197 lb) Documented at 2025 0830            GEN: no distress, alert and oriented  HEENT: NACT, EOMI, moist mucous membranes  Lungs: CTAB, no wheezes, rales or rhonchi  CV: normal rate, regular rhythm, normal S1, S2, no murmurs, RRA access site cdi, no hematoma, pulse 2+  Abdomen: soft, nontender, nondistended, NABS  Extremities: no edema  Skin: no rash, warm, dry  Heme/Lymph: no bruising  Psych: organized thought, normal behavior and affect    Results Review:    Results from last 7 days   Lab Units 25  0302   SODIUM mmol/L 142   POTASSIUM mmol/L 4.6   CHLORIDE mmol/L 108*   CO2 mmol/L 27.5   BUN mg/dL 17   CREATININE mg/dL 1.11   GLUCOSE mg/dL 105*   CALCIUM mg/dL 9.6         Results from last 7 days   Lab Units 25  0302   WBC 10*3/mm3 5.46   HEMOGLOBIN g/dL 9.9*   HEMATOCRIT % 32.2*   PLATELETS 10*3/mm3 234     Results from last 7 days   Lab Units 25  1900   INR  1.09   APTT seconds 26.7     Results from last 7 days   Lab Units 25  1900   MAGNESIUM mg/dL 2.0     Results from last 7 days   Lab Units 25  1900   CHOLESTEROL mg/dL 115   TRIGLYCERIDES mg/dL 97   HDL CHOL mg/dL 54 "   LDL CHOL mg/dL 43               Medication Review:   apixaban, 10 mg, Oral, Q12H   Followed by  [START ON 4/1/2025] apixaban, 5 mg, Oral, Q12H  aspirin, 81 mg, Oral, Daily  atorvastatin, 40 mg, Oral, Nightly  carvedilol, 3.125 mg, Oral, Q12H  ceFAZolin, 2,000 mg, Intravenous, On Call to OR  insulin glargine, 25 Units, Subcutaneous, Nightly  insulin lispro, 2-9 Units, Subcutaneous, 4x Daily AC & at Bedtime  pantoprazole, 40 mg, Oral, Daily  sodium chloride, 10 mL, Intravenous, Q12H              Assessment & Plan   #Multivessel CAD  #Hypertension  #Hyperlipidemia  #Diabetes    74-year-old man with hypertension, hyperlipidemia, diabetes, history of provoked PE who presented with exertional left arm and jaw discomfort.  Stress echo revealed apical and anterolateral wall ischemia despite submaximal heart rate as well as abnormal EKG. He was found to have severe multivessel CAD including mid LAD, OM1, and RCA.    Plan was initially for CABG today however he was found to have and acute superficial vein thrombosis involving the right great saphenous vein from mid thigh to the saphenofemoral junction.  Also with chronic superficial vein thrombosis and wall thickening along the right and left small saphenous veins.    - continue eliquis 5 mg BID  - continue atorvastatin 40mg daily, carvedilol 3.125 mg BID      Jai Fisher MD, FACC, [facility]  03/26/25  08:49 EDT

## 2025-03-26 NOTE — PROGRESS NOTES
" LOS: 2 days   Patient Care Team:  Lavell Dunaway MD as PCP - General (Family Medicine)  Papo Gale MD as Consulting Physician (Hematology and Oncology)  Rachael Reynaga MD as Referring Physician (Hospitalist)    Chief Complaint: post op follow-up    Subjective      Vital Signs  Temp:  [97.7 °F (36.5 °C)-98.1 °F (36.7 °C)] 97.8 °F (36.6 °C)  Heart Rate:  [59-73] 65  Resp:  [16] 16  BP: (126-146)/(68-71) 146/70  Body mass index is 26.6 kg/m².    Intake/Output Summary (Last 24 hours) at 3/26/2025 1001  Last data filed at 3/25/2025 1807  Gross per 24 hour   Intake 240 ml   Output --   Net 240 ml     No intake/output data recorded.             03/24/25  0830 03/25/25  0500   Weight: 89.4 kg (197 lb) 91.4 kg (201 lb 9.6 oz)         Objective:  Vital signs: (most recent): Blood pressure 146/70, pulse 65, temperature 97.8 °F (36.6 °C), temperature source Oral, resp. rate 16, height 185.4 cm (73\"), weight 91.4 kg (201 lb 9.6 oz), SpO2 96%.                  Results Review:        WBC WBC   Date Value Ref Range Status   03/25/2025 5.46 3.40 - 10.80 10*3/mm3 Final   03/24/2025 5.79 3.40 - 10.80 10*3/mm3 Final      HGB Hemoglobin   Date Value Ref Range Status   03/25/2025 9.9 (L) 13.0 - 17.7 g/dL Final   03/24/2025 10.1 (L) 13.0 - 17.7 g/dL Final      HCT Hematocrit   Date Value Ref Range Status   03/25/2025 32.2 (L) 37.5 - 51.0 % Final   03/24/2025 31.2 (L) 37.5 - 51.0 % Final      Platelets Platelets   Date Value Ref Range Status   03/25/2025 234 140 - 450 10*3/mm3 Final   03/24/2025 225 140 - 450 10*3/mm3 Final        PT/INR:    Protime   Date Value Ref Range Status   03/24/2025 14.1 11.7 - 14.2 Seconds Final   /  INR   Date Value Ref Range Status   03/24/2025 1.09 0.90 - 1.10 Final       Sodium Sodium   Date Value Ref Range Status   03/25/2025 142 136 - 145 mmol/L Final   03/24/2025 140 136 - 145 mmol/L Final      Potassium Potassium   Date Value Ref Range Status   03/25/2025 4.6 3.5 - 5.2 mmol/L Final "   03/24/2025 4.2 3.5 - 5.2 mmol/L Final      Chloride Chloride   Date Value Ref Range Status   03/25/2025 108 (H) 98 - 107 mmol/L Final   03/24/2025 106 98 - 107 mmol/L Final      Bicarbonate CO2   Date Value Ref Range Status   03/25/2025 27.5 22.0 - 29.0 mmol/L Final   03/24/2025 25.0 22.0 - 29.0 mmol/L Final      BUN BUN   Date Value Ref Range Status   03/25/2025 17 8 - 23 mg/dL Final   03/24/2025 20 8 - 23 mg/dL Final      Creatinine Creatinine   Date Value Ref Range Status   03/25/2025 1.11 0.76 - 1.27 mg/dL Final   03/24/2025 0.96 0.76 - 1.27 mg/dL Final      Calcium Calcium   Date Value Ref Range Status   03/25/2025 9.6 8.6 - 10.5 mg/dL Final   03/24/2025 9.2 8.6 - 10.5 mg/dL Final      Magnesium Magnesium   Date Value Ref Range Status   03/24/2025 2.0 1.6 - 2.4 mg/dL Final          apixaban, 10 mg, Oral, Q12H   Followed by  [START ON 4/1/2025] apixaban, 5 mg, Oral, Q12H  aspirin, 81 mg, Oral, Daily  atorvastatin, 40 mg, Oral, Nightly  carvedilol, 3.125 mg, Oral, Q12H  ceFAZolin, 2,000 mg, Intravenous, On Call to OR  insulin glargine, 25 Units, Subcutaneous, Nightly  insulin lispro, 2-9 Units, Subcutaneous, 4x Daily AC & at Bedtime  pantoprazole, 40 mg, Oral, Daily  sodium chloride, 10 mL, Intravenous, Q12H                 CAD (coronary artery disease)    Thrombophlebitis of superficial veins of right lower extremity      Assessment & Plan    -Severe multivessel CAD  -Diabetes type 2  -Hypertension  -Hyperlipidemia  -Anemia     Acute DVT right SVG from mid thigh to saphenofemoral junction- eliquis started, vascular surgery and hematology consulted. Case has been postponed.  Dr. Grissom and myself discussed with the patient this morning.     BELKYS Miranda  03/26/25  10:01 EDT

## 2025-03-26 NOTE — CONSULTS
Name: Marysol Bennett ADMIT: 3/24/2025   : 1950  PCP: Lavell Dunaway MD    MRN: [id number] LOS: 2 days   AGE/SEX: 74 y.o. male  ROOM: [facility] 0/     Inpatient Vascular Surgery Consult  Consult performed by: Devon Cline MD  Consult ordered by: Jr Kg Grissom MD        CC: Fatigue  Subjective     History of Present Illness  74 y.o. 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      Review of Systems    History Review Reviewed Comments   Past Medical History:  [x]  Coronary artery disease, previous DVT and PE in .  Type 2 diabetes   Past Surgical History: [x]  Cardiac catheterization and hip replacement as well as left shoulder   Family History: [x]  Denies family history of hypercoagulable states   Social History: [x]  Non-smoker     Scheduled Meds:     apixaban, 10 mg, Oral, Q12H   Followed by  [START ON 2025] apixaban, 5 mg, Oral, Q12H  aspirin, 81 mg, Oral, Daily  atorvastatin, 40 mg, Oral, Nightly  carvedilol, 3.125 mg, Oral, Q12H  ceFAZolin, 2,000 mg, Intravenous, On Call to OR  insulin glargine, 25 Units, Subcutaneous, Nightly  insulin lispro, 2-9 Units, Subcutaneous, 4x Daily AC & at Bedtime  pantoprazole, 40 mg, Oral, Daily  sodium chloride, 10 mL, Intravenous, Q12H      IV Meds:        Allergies: Patient has no known allergies.    Objective   Temp:  [97.7 °F (36.5 °C)-98.1 °F (36.7 °C)] 97.8 °F (36.6 °C)  Heart Rate:  [59-73] 65  Resp:  [16] 16  BP: (126-146)/(68-71) 146/70    No intake/output data recorded.    Physical Exam  Vitals reviewed.   Constitutional:       Appearance: He is well-developed.   Eyes:      Conjunctiva/sclera: Conjunctivae normal.   Cardiovascular:      Rate and Rhythm: Normal rate.   Pulmonary:      Effort: Pulmonary effort is normal.   Abdominal:      Palpations: Abdomen is soft.      Tenderness: There is no abdominal tenderness.    Neurological:      Mental Status: He is alert and oriented to person, place, and time.     Vascular: No tenderness.  No palpable cords.  No varicosities.  No hemosiderin staining.      Data Reviewed:  CBC    Results from last 7 days   Lab Units 25  0302 25  1900 25  1011   WBC 10*3/mm3 5.46 5.79 4.69   HEMOGLOBIN g/dL 9.9* 10.1* 10.0*   PLATELETS 10*3/mm3 234 225 249     BMP   Results from last 7 days   Lab Units 25  0302 25  1900 25  1011   SODIUM mmol/L 142 140 140   POTASSIUM mmol/L 4.6 4.2 4.7   CHLORIDE mmol/L 108* 106 106   CO2 mmol/L 27.5 25.0 24.5   BUN mg/dL 17 20 23   CREATININE mg/dL 1.11 0.96 1.10   GLUCOSE mg/dL 105* 189* 187*   MAGNESIUM mg/dL  --  2.0  --      Radiology(recent) Cardiac Catheterization/Vascular Study  Addendum Date: 3/25/2025  [facility] CARDIAC CATHETERIZATION PROCEDURE REPORT Patient: Marysol Bennett : 1950 MRN: [id number] Procedure Date: 25 Referring Physician: Thomas Yoo MD Interventional Cardiologist: Jai Fisher MD, [facility], [facility] Indication: Abnormal stress test Clinical Presentation: 74-year-old man with hypertension, hyperlipidemia, diabetes, history of provoked PE who presented with exertional left arm and jaw discomfort.  Stress echo revealed apical and anterolateral wall ischemia despite submaximal heart rate as well as abnormal EKG. Procedure performed: Coronary angiography Left heart catheterization Access Sites: right radial artery Findings: 1. Coronary Artery Anatomy: Dominance: right dominant Left Main: Large-caliber vessel that bifurcates into a large caliber LAD and large caliber circumflex.  This vessel is free of disease. Left Anterior Descending: Large-caliber vessel with a tortuous proximal segment.  There is a long area of 60 to 70% stenosis in the mid LAD.  Distally the vessel tapers and wraps around the apex and has mild diffuse disease. Circumflex Artery: Large-caliber vessel that leads to a  large caliber OM1.  There is a 90% stenosis that extends from the mid circumflex into the ostium of the OM1. Right Coronary Artery:  Large-caliber vessel with a 50% stenosis in its proximal segment.  There are sequential 90% stenoses in its mid and mid to distal segment.  The distal RCA is medium caliber, free of disease and leads to a medium caliber RPL and small caliber RPDA.  The ostium of the RPDA has a 50% stenosis. 2. Hemodynamics: Left Ventricle: 121/10 mmHg Aorta: 123/56/83 mmHg Conclusions: Severe multivessel CAD. Normal left sided filling pressures. Recommendations: CT surgery evaluation for CABG. Continue aggressive risk factor modification. Procedure Status: Elective Moderate Sedation: Start time: 10:12 End time: 10:24 Procedure Details: Informed consent was obtained with an explanation of the risk and benefits of the procedure. The patient was brought to the Cardiac Catheterization Laboratory and was prepped and draped in a standard sterile fashion. Moderate sedation with Fentanyl and Versed was administered. I was present with the patient for the duration of the moderate sedation and directly supervised the circulating nurse, an independent trained observer, who administered the Fentanyl and Versed and monitored the patient hemodynamically throughout the procedure.  Lidocaine 2% was used to anesthetize the right radial artery and a 6 Fr slender sheath was placed.  A Tinybeansterity Ultra catheter was then advanced over a 0.035 guidewire into the ascending aorta.  This catheter was used to cross the aortic valve to measure left ventricular pressures and pullback was performed across the aortic valve to measure the pressure gradient. The catheter was then used to engage the right and left coronary arteries with diagnostic angiography obtained. The catheter was then removed over a guidewire. The radial artery sheath was removed without difficulty and a Vasc-Band was placed over the access site with  excellent hemostasis. Patient tolerated the procedure well without any complications, and transferred to the post procedure area for recovery in a stable condition. Complications: None. Estimated Blood Loss: Minimal. Jai Fisher MD, [facility], [facility] 03/24/25 14:22 EDT     XR Chest PA & Lateral  Result Date: 3/24/2025   Negative chest radiograph, no acute abnormality.  This report was finalized on 3/24/2025 5:39 PM by Dr. Son Gray M.D on Workstation: [id number]        Labs significant for: Hemoglobin 9.9.  Platelet count 234.  Creatinine 1.11.    Imaging Studies:  Duplex Venous Lower Extremity - Bilateral CAR (06/15/2023 15:57)     Chronic right lower extremity superficial thrombophlebitis noted in the small saphenous.    Acute left lower extremity deep vein thrombosis noted in the mid femoral.    Chronic left lower extremity deep vein thrombosis noted in the posterial tibial.    Chronic left lower extremity superficial thrombophlebitis noted in the small saphenous.    All other veins appeared normal bilaterally.     Duplex Venous Lower Extremity - Bilateral CAR (12/04/2023 13:13)     Chronic right lower extremity superficial thrombophlebitis noted in the small saphenous.    Chronic left lower extremity deep vein thrombosis noted in the mid femoral.    Chronic left lower extremity superficial thrombophlebitis noted in the small saphenous.    All other veins appeared normal bilaterally.     Duplex Vein Mapping Lower Extremity - Bilateral CAR (03/25/2025 16:16)     Acute superficial vein thrombosis involving the right great saphenous vein from mid thigh to the saphenofemoral junction.  The great saphenous vein in the distal thigh and calf is inadequate.    The left great saphenous vein is patent and of adequate size in the proximal to mid thigh, but is inadequate at distal thigh and in the calf..    Chronic superficial vein thrombosis and wall thickening involving the right and left  small saphenous veins, both of which are inadequate.    Active Hospital Problems    Diagnosis  POA    **CAD (coronary artery disease) [I25.10]  Yes    Thrombophlebitis of superficial veins of right lower extremity [I80.01]  Yes      Resolved Hospital Problems    Diagnosis Date Resolved POA    Chronic stable angina [I20.89] 03/21/2025 Unknown    Abnormal stress echo [R94.39] 03/21/2025 Unknown       Data Points:  During this visit the following were done:  Labs Reviewed [x]  []  []  Labs Ordered []  []  []  Radiology Reports Reviewed [x]    Radiology Ordered []    EKG, echo, and/or stress test reviewed []    Vascular lab results reviewed  [x]    Vascular lab images reviewed and interpreted per myself   []    Referring Provider Records Reviewed []    ER Records Reviewed []    Hospital Records Reviewed/Summarized [x]    History Obtained From Family []    Radiological images view and Interpreted per myself []    Case Discussed with referring provider [x]   Dr. Grissom  Decision to obtain and request outside records  []      Risk: The patient has been started on therapeutic anticoagulation which has increased risk of bleeding.  Discussed with the patient.  Has tolerated this well in the past though.  Also delaying cardiac intervention has some risk but in discussion with cardiothoracic surgery seems acceptable.    Active Hospital Problems:   Active Hospital Problems    Diagnosis  POA    **CAD (coronary artery disease) [I25.10]  Yes    Thrombophlebitis of superficial veins of right lower extremity [I80.01]  Yes      Resolved Hospital Problems    Diagnosis Date Resolved POA    Chronic stable angina [I20.89] 03/21/2025 Unknown    Abnormal stress echo [R94.39] 03/21/2025 Unknown      Assessment & Plan   Assessment / Plan     74 y.o. male who was admitted for multivessel coronary artery disease that primarily presents as fatigued with no unstable angina pattern.  As part of his preop workup he had carotid and lower extremity  venous duplex scans done for vein mapping that demonstrated acute superficial thrombophlebitis in the right great saphenous vein.  For this reason we were asked to see him.  Patient's medical records were reviewed.  In summary, he had a trauma to the lower extremity in 2023 and this was complicated by a DVT in the femoral vein as well as pulmonary embolism.  He was seen by Dr. Gale of hematology at that time who recommended 6 months of anticoagulation and was seen in December 2023 with a change to chronic findings only and in the setting of a first-time provoked DVT recommended discontinuation of anticoagulation.  There is no other personal or family history of DVT, PE, heart or hypercoagulable state.  The patient currently has no complaints with his lower extremities.  Denies any palpable cords or tenderness in the thigh medially.    On exam, appears a bit younger than stated age.  No acute distress.  Resting comfortably on room air.  Lower extremities are tanned with palpable pedal pulses and no palpable cords or evidence of hemosiderin staining.  No dependent rubor.  No ulcers.    I reviewed the venous duplex studies starting with the one on 6/15/2023 that shows acute DVT in the mid femoral vein on the left as well as some evidence of chronic STP in the small saphenous on the right and left.  However, on his scan yesterday he has acute superficial thrombophlebitis of the right great saphenous vein that extends up to the saphenofemoral junction.    Acute STP of the GSV extending to the SFJ does pose some significant risk including propagation into the deep venous system as well as pulmonary embolism.  The Society for vascular surgery on the American venous forearm would recommend full anticoagulation for minimum of 6 weeks with patient's with STP extending within 3 cm of the SFJ which this patient has.  Given these guidelines, it is advisable to treat the patient with anticoagulation for this time.  Before  proceeding with coronary artery bypass graft surgery unless the delay would cause significant risk.  Given that this is his second episode and this was only provoked potentially by 2 subsequent two hour plane trips I agree with Dr. Grissom that it would not be unreasonable for hematology to see as well.  Follow-up imaging in 6 weeks would also be appropriate.  We can arrange at their office or could be done at the hospital as part of his preoperative evaluation.    I discussed the patients findings and my recommendations with patient and consulting provider.  Please call my office with any question: [phone]

## 2025-03-26 NOTE — CASE MANAGEMENT/SOCIAL WORK
Discharge Planning Assessment  [facility]     Patient Name: Marysol Bennett  MRN: [id number]  Today's Date: 3/26/2025    Admit Date: 3/24/2025    Plan: Home; Denies needs.  Will return in few weeks for OHS.   Discharge Needs Assessment       Row Name 03/26/25 1342       Living Environment    People in Home spouse    Name(s) of People in Home Nivia Elder/spouse    Current Living Arrangements home    Potentially Unsafe Housing Conditions none    In the past 12 months has the electric, gas, oil, or water company threatened to shut off services in your home? No    Primary Care Provided by self    Provides Primary Care For no one    Family Caregiver if Needed spouse    Family Caregiver Names Spouse/wife    Quality of Family Relationships helpful;involved;supportive    Able to Return to Prior Arrangements yes       Resource/Environmental Concerns    Resource/Environmental Concerns none    Transportation Concerns none       Transportation Needs    In the past 12 months, has lack of transportation kept you from medical appointments or from getting medications? no    In the past 12 months, has lack of transportation kept you from meetings, work, or from getting things needed for daily living? No       Food Insecurity    Within the past 12 months, you worried that your food would run out before you got the money to buy more. Never true    Within the past 12 months, the food you bought just didn't last and you didn't have money to get more. Never true       Transition Planning    Patient/Family Anticipates Transition to home with family    Patient/Family Anticipated Services at Transition none    Transportation Anticipated family or friend will provide       Discharge Needs Assessment    Readmission Within the Last 30 Days no previous admission in last 30 days    Equipment Currently Used at Home scales;pulse ox;bath bench;glucometer  CASSIE 3 glucometer    Concerns to be Addressed no discharge needs identified;denies  needs/concerns at this time    Do you want help finding or keeping work or a job? I do not need or want help    Do you want help with school or training? For example, starting or completing job training or getting a high school diploma, GED or equivalent No    Anticipated Changes Related to Illness none    Equipment Needed After Discharge none    Discharge Facility/Level of Care Needs home with home health    Provided Post Acute Provider List? N/A    Provided Post Acute Provider Quality & Resource List? N/A                   Discharge Plan       Row Name 03/26/25 6767       Plan    Plan Home; Denies needs.  Will return in few weeks for OHS.    Plan Comments CCP spoke with pleasant patient at bedside. Introduced self and role.  Discharge planning discussed.  Information on face sheet verified.  Patient stated he is IADL's and drives.  Patient lives with spouse/Nivia Elder in a two-story home with seven entrance stair steps.  Patient reports he is active and lives in [place] a few months out of the year.  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  Patients PCP confirmed as, Lavell Dunaway and home pharmacy is [facility].  Patient has the following DME- [employer] 3 glucometer, scale, pulse oximeter and a teakwood bath bench.  Patient reports he has used past home health in 2020 after rotator cuff surgery but cannot recall the agency name.  Patient denies past sub-acute rehab.  Patient plans to return home at discharge and spouse will transport.  Patient denies current discharge needs.  CCP will continue to follow….…Bre TOUSSAINT /ALLAN.                    Expected Discharge Date and Time       Expected Discharge Date Expected Discharge Time    Mar 26, 2025            Demographic Summary       Row Name 03/26/25 1330       General Information    Admission Type inpatient    Arrived From home    Required Notices Provided Important Message from Medicare    Referral  Source admission list    Reason for Consult discharge planning    Preferred Language English       Contact Information    Permission Granted to Share Info With                    Functional Status       Row Name 03/26/25 6008       Functional Status    Usual Activity Tolerance good    Current Activity Tolerance good       Physical Activity    On average, how many days per week do you engage in moderate to strenuous exercise (like a brisk walk)? 5 days    On average, how many minutes do you engage in exercise at this level? 30 min    Number of minutes of exercise per week 150       Assessment of Health Literacy    How often do you have someone help you read hospital materials? Never    How often do you have problems learning about your medical condition because of difficulty understanding written information? Never    How often do you have a problem understanding what is told to you about your medical condition? Never    How confident are you filling out medical forms by yourself? Quite a bit    Health Literacy Good       Functional Status, IADL    Medications independent    Meal Preparation independent    Housekeeping independent    Laundry independent    Shopping independent    If for any reason you need help with day-to-day activities such as bathing, preparing meals, shopping, managing finances, etc., do you get the help you need? I don't need any help       Mental Status    General Appearance WDL WDL       Mental Status Summary    Recent Changes in Mental Status/Cognitive Functioning no changes       Employment/    Employment Status retired                   Psychosocial    No documentation.                  Abuse/Neglect    No documentation.                  Legal    No documentation.                  Substance Abuse    No documentation.                  Patient Forms    No documentation.                     Bre Menendez RN

## 2025-03-26 NOTE — OUTREACH NOTE
Prep Survey      Flowsheet Row Responses   [facility] patient discharged from? [place]   Is LACE score < 7 ? No   Eligibility Readm Mgmt   Discharge diagnosis Acute RLE superficial thrombophlebitis of the great saphenous vein:   Does the patient have one of the following disease processes/diagnoses(primary or secondary)? Other   Does the patient have Home health ordered? No   Is there a DME ordered? No   Prep survey completed? Yes            FAHAD WALTERS - Registered Nurse          
none

## 2025-03-26 NOTE — PLAN OF CARE
Goal Outcome Evaluation:  Plan of Care Reviewed With: patient        Progress: improving  Outcome Evaluation: pt aox4, VSS, no c/o pain. R radial site guaze and tegaderm clean/dry/intact/soft. Vascular/hem&onc consults called to see this AM. Eliquis given per mar. Continue plan of care.

## 2025-03-26 NOTE — PROGRESS NOTES
Enter Query Response Below      Query Response: Superficial thrombophlebitis of the right great saphenous vein, present on admission              If applicable, please update the problem list.

## 2025-03-27 LAB
B2 GLYCOPROT1 IGA SER-ACNC: <9 GPI IGA UNITS (ref 0–25)
B2 GLYCOPROT1 IGG SER-ACNC: <9 GPI IGG UNITS (ref 0–20)
B2 GLYCOPROT1 IGM SER-ACNC: <9 GPI IGM UNITS (ref 0–32)
BH BB BLOOD EXPIRATION DATE: NORMAL
BH BB BLOOD EXPIRATION DATE: NORMAL
BH BB BLOOD TYPE BARCODE: 5100
BH BB BLOOD TYPE BARCODE: 5100
BH BB DISPENSE STATUS: NORMAL
BH BB DISPENSE STATUS: NORMAL
BH BB PRODUCT CODE: NORMAL
BH BB PRODUCT CODE: NORMAL
BH BB UNIT NUMBER: NORMAL
BH BB UNIT NUMBER: NORMAL
CARDIOLIPIN IGG SER IA-ACNC: <9 GPL U/ML (ref 0–14)
CARDIOLIPIN IGM SER IA-ACNC: 18 MPL U/ML (ref 0–12)
CROSSMATCH INTERPRETATION: NORMAL
CROSSMATCH INTERPRETATION: NORMAL
UNIT  ABO: NORMAL
UNIT  ABO: NORMAL
UNIT  RH: NORMAL
UNIT  RH: NORMAL

## 2025-03-27 NOTE — CASE MANAGEMENT/SOCIAL WORK
Case Management Discharge Note      Final Note: Home    Provided Post Acute Provider List?: N/A  Provided Post Acute Provider Quality & Resource List?: N/A    Selected Continued Care - Discharged on 3/26/2025 Admission date: 3/24/2025 - Discharge disposition: Home or Self Care      Destination    No services have been selected for the patient.                Durable Medical Equipment    No services have been selected for the patient.                Dialysis/Infusion    No services have been selected for the patient.                Home Medical Care    No services have been selected for the patient.                Therapy    No services have been selected for the patient.                Community Resources    No services have been selected for the patient.                Community & DME    No services have been selected for the patient.                         Final Discharge Disposition Code: 01 - home or self-care

## 2025-03-28 ENCOUNTER — TELEPHONE (OUTPATIENT)
Dept: CARDIAC SURGERY | Facility: CLINIC | Age: 75
End: 2025-03-28
Payer: MEDICARE

## 2025-03-28 LAB
APTT SCREEN TO CONFIRM RATIO: 0.98 RATIO (ref 0–1.34)
AT III PPP CHRO-ACNC: 100 % (ref 90–134)
CONFIRM APTT/NORMAL: 41.9 SEC (ref 0–47.6)
DRVVT SCREEN TO CONFIRM RATIO: 0.9 RATIO (ref 0.8–1.2)
F5 GENE MUT ANL BLD/T: NORMAL
FACTOR II, DNA ANALYSIS: NORMAL
LA 2 SCREEN W REFLEX-IMP: ABNORMAL
MIXING DRVVT: 48.9 SEC (ref 0–40.4)
PROT C ACT/NOR PPP: 152 % (ref 86–163)
PROT S AG ACT/NOR PPP IA: 86 % (ref 60–150)
PROT S FREE AG ACT/NOR PPP IA: 107 % (ref 61–136)
SCREEN APTT: 34.8 SEC (ref 0–43.5)
SCREEN DRVVT: 56.5 SEC (ref 0–47)
THROMBIN TIME: 17 SEC (ref 0–23)

## 2025-03-28 NOTE — DISCHARGE SUMMARY
[facility] Discharge Summary      Patient Name: Marysol Bennett  Age/Sex: 74 y.o. male  : 1950  MRN: [id number]    Encounter Provider: Jai Fisher MD, [facility]  Referring Provider: Jai Black MD  Place of Service: [facility] CARDIOLOGY  Patient Care Team:  Lavell Dunaway MD as PCP - General (Family Medicine)  Papo Gale MD as Consulting Physician (Hematology and Oncology)  Rachael Reynaga MD as Referring Physician (Hospitalist)         Date of Discharge:  3/28/2025     Date of Admit: 3/24/2025      Discharge Diagnosis:   CAD (coronary artery disease)    Thrombophlebitis of superficial veins of right lower extremity        Hospital Course: 74-year-old man with hypertension, hyperlipidemia, diabetes, history of provoked PE who presented with exertional left arm and jaw discomfort.  Stress echo revealed apical and anterolateral wall ischemia despite submaximal heart rate as well as abnormal EKG. He was found to have severe multivessel CAD including mid LAD, OM1, and RCA. Plan was initially for CABG however he was found to have an acute superficial vein thrombosis involving the right great saphenous vein from mid thigh to the saphenofemoral junction.  Also found to have chronic superficial vein thrombosis and wall thickening along the right and left small saphenous veins. He was started on DOAC with plans of 6 week of therapy prior to consideration of CABG.    Vitals:    25 1205   BP: 161/72   Pulse: 61   Resp: 16   Temp: 98 °F (36.7 °C)   SpO2:      Physical Exam:  GEN: no distress, alert and oriented  HEENT: NACT, EOMI, moist mucous membranes  Lungs: CTAB, no wheezes, rales or rhonchi  CV: normal rate, regular rhythm, normal S1, S2, no murmurs, +2 carotid and radial pulses b/l, no carotid bruit  Abdomen: soft, nontender, nondistended, NABS  Extremities: no edema  Skin: no rash, warm, dry  Heme/Lymph: no bruising  Psych: organized thought,  normal behavior and affect     Procedures Performed:  Procedure(s):  Coronary angiography  Left heart cath  Resting Full Cycle Ratio-Procedure not performed         Consults:  Consults       Date and Time Order Name Status Description    3/25/2025  5:54 PM Hematology & Oncology Inpatient Consult Completed     3/25/2025  5:46 PM Inpatient Vascular Surgery Consult Completed             Pertinent Test Results:    Results from last 7 days   Lab Units 03/26/25  0949 03/25/25  0302 03/24/25  1900   SODIUM mmol/L 136 142 140   POTASSIUM mmol/L 4.9 4.6 4.2   CHLORIDE mmol/L 102 108* 106   CO2 mmol/L 24.6 27.5 25.0   BUN mg/dL 22 17 20   CREATININE mg/dL 1.02 1.11 0.96   GLUCOSE mg/dL 275* 105* 189*   CALCIUM mg/dL 9.4 9.6 9.2           Results from last 7 days   Lab Units 03/26/25  0949 03/25/25  0302 03/24/25  1900   WBC 10*3/mm3 6.26 5.46 5.79   HEMOGLOBIN g/dL 10.0* 9.9* 10.1*   HEMATOCRIT % 31.9* 32.2* 31.2*   PLATELETS 10*3/mm3 252 234 225     Results from last 7 days   Lab Units 03/24/25  1900   INR  1.09   APTT seconds 26.7     Results from last 7 days   Lab Units 03/24/25  1900   MAGNESIUM mg/dL 2.0     Results from last 7 days   Lab Units 03/24/25  1900   CHOLESTEROL mg/dL 115   TRIGLYCERIDES mg/dL 97   HDL CHOL mg/dL 54     Results from last 7 days   Lab Units 03/24/25  1900   PROBNP pg/mL 287.0               Discharge Medications     Your medication list        START taking these medications        Instructions Last Dose Given Next Dose Due   apixaban 5 MG tablet tablet  Commonly known as: ELIQUIS  Start taking on: March 26, 2025      Take 2 tablets by mouth Every 12 (Twelve) Hours for 6 days, THEN 1 tablet Every 12 (Twelve) Hours for 42 days. Indications: DVT/PE (active thrombosis)              CONTINUE taking these medications        Instructions Last Dose Given Next Dose Due   acetaminophen 650 MG 8 hr tablet  Commonly known as: TYLENOL      Take 1 tablet by mouth Every 8 (Eight) Hours As Needed for Mild  Pain.       albuterol sulfate  (90 Base) MCG/ACT inhaler  Commonly known as: PROVENTIL HFA;VENTOLIN HFA;PROAIR HFA      2 puffs every 6h prn cough/wheezing       atorvastatin 40 MG tablet  Commonly known as: LIPITOR      Take 1 tablet by mouth once daily       BD Pen Needle Meredith 2nd Gen 32G X 4 MM misc  Generic drug: Insulin Pen Needle      1 each by Other route Daily.       carbamide peroxide 6.5 % otic solution  Commonly known as: DEBROX      Administer 5 drops into the left ear 3 (Three) Times a Week.       DHEA PO      Take 1 tablet by mouth Daily.       esomeprazole 40 MG capsule  Commonly known as: nexIUM      TAKE 1 CAPSULE BY MOUTH ONCE DAILY IN THE MORNING BEFORE BREAKFAST       fluticasone 50 MCG/ACT nasal spray  Commonly known as: FLONASE      2 sprays to each nostril daily       glimepiride 4 MG tablet  Commonly known as: AMARYL      Take 1 tablet by mouth 2 (Two) Times a Day.       hydroCHLOROthiazide 12.5 MG tablet      Take 1 tablet by mouth Daily.       Insulin Lispro 100 UNIT/ML injection  Commonly known as: humaLOG      Inject  under the skin into the appropriate area as directed 3 (Three) Times a Day Before Meals.       irbesartan 300 MG tablet  Commonly known as: AVAPRO      Take 1 tablet by mouth once daily       lidocaine 5 %  Commonly known as: LIDODERM      Place 1 patch on the skin as directed by provider Daily. Remove & Discard patch within 12 hours or as directed by MD       meclizine 25 MG tablet  Commonly known as: ANTIVERT      Take 1 tablet by mouth 3 (Three) Times a Day As Needed for Dizziness.       meloxicam 15 MG tablet  Commonly known as: MOBIC      Take 1 tablet by mouth once daily       metFORMIN 500 MG tablet  Commonly known as: GLUCOPHAGE      Take 2 tablets by mouth 2 (Two) Times a Day.       methocarbamol 750 MG tablet  Commonly known as: ROBAXIN      Take 1 tablet by mouth 3 (Three) Times a Day As Needed for Muscle Spasms.       Potassium 99 MG tablet      Take 99 mg  by mouth Daily.       PROBIOTIC-10 PO      Take 1 tablet by mouth Daily.       sertraline 25 MG tablet  Commonly known as: ZOLOFT      Take 1 tablet by mouth Daily.       terbinafine 250 MG tablet  Commonly known as: lamiSIL      Take 1 tablet by mouth Daily.       TOUJEO SOLOSTAR SC      Inject 18 Units under the skin into the appropriate area as directed Daily.       Trulicity 0.75 MG/0.5ML solution pen-injector  Generic drug: Dulaglutide      Inject 0.75 mg as directed 1 (One) Time Per Week.       TURMERIC PO      Take 1 tablet by mouth Daily.       VITAMIN B-12 PO      Take 1 tablet by mouth Daily.                 Where to Get Your Medications        These medications were sent to [address], [facility] [address]      Hours: Monday to Friday 7 AM to 6 PM, Saturday & Sunday 8 AM to 4:30 PM (Closed 12 PM to 12:30 PM) Phone: [phone]   apixaban 5 MG tablet tablet       Information about where to get these medications is not yet available    Ask your nurse or doctor about these medications  metFORMIN 500 MG tablet           Discharge Diet:         Discharge disposition: Home    Discharge condition: Stable      Follow-up Appointments  Future Appointments   Date Time Provider Department Center   4/18/2025  1:00 PM VITALS ONLY [facility]   4/18/2025  1:20 PM Papo Gale MD K [facility]   7/7/2025 10:30 AM Lavell Dunaway MD [facility]      Follow-up Information       Lavell Dunaway MD .    Specialty: Family Medicine  Contact information:  [address] [facility]  [address]  [facility] [id number] [phone]               Jai Black MD Follow up in 1 month(s).    Specialty: Cardiology  Why: please call to make an appointment  Contact information:  [address] [facility]  [address]  [facility] [id number] [phone]                               Test Results Pending at Discharge         Time:   I spent  > 30  minutes on this discharge activity which  included: face-to-face encounter with the patient, reviewing the data in the system, coordination of the care with the nursing staff as well as consultants, documentation, and entering orders.        Jai Fisher MD, FACC, [facility]  03/28/25  12:18 EDT

## 2025-03-31 ENCOUNTER — READMISSION MANAGEMENT (OUTPATIENT)
Dept: CALL CENTER | Facility: HOSPITAL | Age: 75
End: 2025-03-31
Payer: MEDICARE

## 2025-03-31 NOTE — OUTREACH NOTE
Medical Week 1 Survey      Flowsheet Row Responses   [facility] patient discharged from? [place]   Does the patient have one of the following disease processes/diagnoses(primary or secondary)? Other   Week 1 attempt successful? Yes   Call start time 1154   Call end time 1157   Discharge diagnosis Acute RLE superficial thrombophlebitis of the great saphenous vein:   Person spoke with today (if not patient) and relationship Nivia, wife   Meds reviewed with patient/caregiver? Yes   Is the patient having any side effects they believe may be caused by any medication additions or changes? No   Does the patient have all medications ordered at discharge? Yes   Is the patient taking all medications as directed (includes completed medication regime)? Yes   Does the patient have a primary care provider?  Yes   Does the patient have an appointment with their PCP within 7 days of discharge? Yes   Has the patient kept scheduled appointments due by today? N/A   Has home health visited the patient within 72 hours of discharge? N/A   Psychosocial issues? No   Did the patient receive a copy of their discharge instructions? Yes   Nursing interventions Reviewed instructions with patient   What is the patient's perception of their health status since discharge? Improving   Is the patient/caregiver able to teach back signs and symptoms related to disease process for when to call PCP? Yes   Is the patient/caregiver able to teach back signs and symptoms related to disease process for when to call 911? Yes   Is the patient/caregiver able to teach back the hierarchy of who to call/visit for symptoms/problems? PCP, Specialist, Home health nurse, Urgent Care, ED, 911 Yes   If the patient is a current smoker, are they able to teach back resources for cessation? Not a smoker   Week 1 call completed? Yes   Graduated Yes   Would this patient benefit from a Referral to [facility] Social Work? No   Is the patient interested in additional calls  from an ambulatory ? No   Graduated/Revoked comments pt at baseline, no more calls necessary   Call end time [id number]            Awilda JUAREZ - Registered Nurse

## 2025-04-03 ENCOUNTER — TELEPHONE (OUTPATIENT)
Dept: INTERNAL MEDICINE | Facility: CLINIC | Age: 75
End: 2025-04-03
Payer: MEDICARE

## 2025-04-03 DIAGNOSIS — E78.2 MIXED HYPERLIPIDEMIA: ICD-10-CM

## 2025-04-03 RX ORDER — ATORVASTATIN CALCIUM 40 MG/1
40 TABLET, FILM COATED ORAL DAILY
Qty: 90 TABLET | Refills: 3 | Status: SHIPPED | OUTPATIENT
Start: 2025-04-03

## 2025-04-03 NOTE — TELEPHONE ENCOUNTER
Caller: PRINCESS WALKER    Relationship to patient: Emergency Contact    Best call back number: [phone]     New or established patient?  [] New  [x] Established    Date of discharge: 03/28/25    Facility discharged from: [facility]     Diagnosis/Symptoms: CAD     Length of stay (If applicable): 4 DAYS     Specialty Only: Did you see a Christian health provider?    [] Yes  [] No  If so, who?     Additional Details:  PATIENTS SPOUSE WOULD LIKE TO GET HIM LIKE TO GET HIM WORKED IN WHEN SHE COMES IN FOR HER APPOINTMENT ON 04/07/25

## 2025-04-07 RX ORDER — ESOMEPRAZOLE MAGNESIUM 40 MG/1
40 CAPSULE, DELAYED RELEASE ORAL
Qty: 90 CAPSULE | Refills: 0 | Status: SHIPPED | OUTPATIENT
Start: 2025-04-07

## 2025-04-09 ENCOUNTER — OFFICE VISIT (OUTPATIENT)
Dept: INTERNAL MEDICINE | Facility: CLINIC | Age: 75
End: 2025-04-09
Payer: MEDICARE

## 2025-04-09 VITALS
HEART RATE: 62 BPM | BODY MASS INDEX: 27.7 KG/M2 | DIASTOLIC BLOOD PRESSURE: 68 MMHG | WEIGHT: 209 LBS | OXYGEN SATURATION: 97 % | HEIGHT: 73 IN | SYSTOLIC BLOOD PRESSURE: 128 MMHG | RESPIRATION RATE: 16 BRPM

## 2025-04-09 DIAGNOSIS — I25.118 CORONARY ARTERY DISEASE OF NATIVE ARTERY OF NATIVE HEART WITH STABLE ANGINA PECTORIS: Primary | ICD-10-CM

## 2025-04-09 DIAGNOSIS — E78.2 MIXED HYPERLIPIDEMIA: ICD-10-CM

## 2025-04-09 RX ORDER — INSULIN GLARGINE 300 U/ML
INJECTION, SOLUTION SUBCUTANEOUS
COMMUNITY
Start: 2025-04-02

## 2025-04-09 RX ORDER — INSULIN LISPRO 100 [IU]/ML
INJECTION, SOLUTION INTRAVENOUS; SUBCUTANEOUS
COMMUNITY
Start: 2025-03-24

## 2025-04-09 NOTE — PROGRESS NOTES
"Chief Complaint  Hospital f/u     Subjective        Marysol Bennett presents to [facility]  History of Present Illness  Patient follows up after recent hospitalization to determine extent of cardiovascular disease planning to undergo CABG May 2025 however patient presently treating DVTs with Eliquis  Has no worsening shortness of breath no chest pain no jaw pain  He is taking it fairly easy not exerting since he has decreased endurance    Objective   Vital Signs:  /68   Pulse 62   Resp 16   Ht 185.4 cm (72.99\")   Wt 94.8 kg (209 lb)   SpO2 97%   BMI 27.58 kg/m²   Estimated body mass index is 27.58 kg/m² as calculated from the following:    Height as of this encounter: 185.4 cm (72.99\").    Weight as of this encounter: 94.8 kg (209 lb).            Physical Exam  Vitals and nursing note reviewed.   Constitutional:       Appearance: Normal appearance.   Cardiovascular:      Rate and Rhythm: Normal rate and regular rhythm.      Pulses: Normal pulses.      Heart sounds: Normal heart sounds.   Pulmonary:      Effort: Pulmonary effort is normal.      Breath sounds: Normal breath sounds.   Musculoskeletal:      Right lower leg: No edema.      Left lower leg: No edema.   Neurological:      Mental Status: He is alert.   Psychiatric:         Mood and Affect: Mood normal.         Behavior: Behavior normal.         Thought Content: Thought content normal.         Judgment: Judgment normal.        Result Review :    Common labs          3/24/2025    19:00 3/25/2025    03:02 3/26/2025    09:49   Common Labs   Glucose 189  105  275    BUN 20  17  22    Creatinine 0.96  1.11  1.02    Sodium 140  142  136    Potassium 4.2  4.6  4.9    Chloride 106  108  102    Calcium 9.2  9.6  9.4    Albumin 4.0      Total Bilirubin 0.4      Alkaline Phosphatase 50      AST (SGOT) 15      ALT (SGPT) 18      WBC 5.79  5.46  6.26    Hemoglobin 10.1  9.9  10.0    Hematocrit 31.2  32.2  31.9    Platelets 225  " "234  252    Total Cholesterol 115      Triglycerides 97      HDL Cholesterol 54      LDL Cholesterol  43      Hemoglobin A1C 7.20                  Assessment and Plan   Diagnoses and all orders for this visit:    1. Coronary artery disease of native artery of native heart with stable angina pectoris (Primary)    2. Mixed hyperlipidemia    Coronary artery disease continue Eliquis continue atorvastatin  Beatties management through endocrinologist     This patient has a PCP that is the continuing focal point for all health care services, and the patient sees this physician to be evaluated for artery disease. The inherent complexity that this code () captures is not in the clinical condition itself-- Blaze artery disease --but rather the cognitition of the continued responsibility of being the focal point for all needed services for this patient.\"     Follow Up   Return in about 2 months (around 6/9/2025), or if symptoms worsen or fail to improve, for Recheck.  Patient was given instructions and counseling regarding his condition or for health maintenance advice. Please see specific information pulled into the AVS if appropriate.             "

## 2025-04-14 ENCOUNTER — TELEPHONE (OUTPATIENT)
Dept: CARDIAC SURGERY | Facility: CLINIC | Age: 75
End: 2025-04-14
Payer: MEDICARE

## 2025-04-18 ENCOUNTER — OFFICE VISIT (OUTPATIENT)
Dept: ONCOLOGY | Facility: CLINIC | Age: 75
End: 2025-04-18
Payer: MEDICARE

## 2025-04-18 VITALS
BODY MASS INDEX: 27.2 KG/M2 | OXYGEN SATURATION: 95 % | HEIGHT: 73 IN | WEIGHT: 205.2 LBS | DIASTOLIC BLOOD PRESSURE: 62 MMHG | TEMPERATURE: 98.3 F | HEART RATE: 84 BPM | SYSTOLIC BLOOD PRESSURE: 106 MMHG

## 2025-04-18 DIAGNOSIS — M79.89 PAIN AND SWELLING OF RIGHT LOWER EXTREMITY: Primary | ICD-10-CM

## 2025-04-18 DIAGNOSIS — M79.604 PAIN AND SWELLING OF RIGHT LOWER EXTREMITY: Primary | ICD-10-CM

## 2025-04-18 NOTE — PROGRESS NOTES
Subjective     REASON FOR CONSULTATION:  DVT/PE  Provide an opinion on any further workup or treatment                               History of Present Illness  This is a very pleasant 73-year-old man with diabetes and hyperlipidemia but otherwise very good health who was walking through a store when he experienced acute onset of pain between the shoulder blades but not much in the way of shortness of breath.  The pain persisted and he was brought to the ER by his wife and evaluation showed an elevated D-dimer and CT angiogram of the chest 6/14/23 showed nonocclusive pulmonary thromboemboli distal right main pulmonary artery into the right upper lobe and right lower lobe with a normal RV: LV ratio of 1.0 small groundglass opacities in the right lower lobe likely sequelae of pulmonary emboli.   Lower extremity duplex on 6/15/2023 showed chronic superficial phlebitis in the small saphenous vein, acute DVT in the mid femoral vein, chronic thrombosis posterior tibial vein.  The patient was placed on Eliquis at discharge.     The patient has no prior history of venous or arterial thrombosis.  He has no family history of venous or arterial thrombosis.  He has no recent hospitalizations, surgeries, long distance travel or illnesses such as COVID-19.  He did have an injury to his left foot and lower extremity 3/26/2023 after a log he was working on rolled onto the foot.  After that he had some ankle swelling but nothing high up in the calf or thigh.  He is due for a colonoscopy soon.  He is up-to-date on prostate screening.    The patient  completed 6 months of anticoagulation.    The patient was admitted to [facility] in March 2025 with symptomatic coronary artery disease and deemed candidate for four-vessel bypass.  He underwent vein mapping in preparation and was found to have acute superficial vein thrombosis in the right greater saphenous vein from the mid thigh to the saphenofemoral junction, chronic superficial vein  thrombosis and wall thickening of the right and left small saphenous veins.  He was seen by Dr. Cline vascular surgery who recommended 6 weeks of anticoagulation and the patient is on week 5 of Eliquis.  He never had symptoms related to the superficial thrombosis and continues to have no pain or swelling of the right lower extremity.    Past Medical History:   Diagnosis Date    Arthritis     Diverticulitis     DVT (deep venous thrombosis) 2023    LEFT LEG, LOG ROLLED ONTO ANKLE AND CAUSED BLOOD CLOTS    GERD (gastroesophageal reflux disease)     Hyperlipidemia     Hypertension     Pulmonary embolism 2023    RIGHT    Type 2 diabetes mellitus     Vertigo         Past Surgical History:   Procedure Laterality Date    APPENDECTOMY  1977    CARDIAC CATHETERIZATION N/A 3/24/2025    Procedure: Coronary angiography;  Surgeon: Jai Black MD;  Location:  [facility] CATH INVASIVE LOCATION;  Service: Cardiovascular;  Laterality: N/A;    CARDIAC CATHETERIZATION N/A 3/24/2025    Procedure: Left heart cath;  Surgeon: Jai Black MD;  Location:  [facility]VASIVE [facility];  Service: Cardiovascular;  Laterality: N/A;    CARDIAC CATHETERIZATION N/A 3/24/2025    Procedure: Resting Full Cycle Ratio-Procedure not performed;  Surgeon: Jai Black MD;  [facility];  Service: Cardiovascular;  Laterality: N/A;    COLONOSCOPY      COLONOSCOPY N/A 08/11/2023    Procedure: COLONOSCOPY INTO CECUM;  Surgeon: Jefferson Rouse Jr., MD;  Location: [facility] ENDOSCOPY;  Service: General;  Laterality: N/A;  PRE:  SCREENING /    POST: DIVERTICULOSIS, HEMORRHOIDS    HAND SURGERY Left     KNEE ARTHROSCOPY Right     KNEE CARTILAGE SURGERY Left     ROTATOR CUFF REPAIR Left 2005    TOTAL HIP ARTHROPLASTY Right 2005    TOTAL SHOULDER REVERSE ARTHROPLASTY Left 10/2020        Current Outpatient Medications on File Prior to Visit   Medication Sig Dispense Refill    acetaminophen (TYLENOL) 650 MG 8 hr tablet Take 1 tablet by mouth Every 8  (Eight) Hours As Needed for Mild Pain.      Apixaban Starter Pack tablet therapy pack Take 2 tablets by mouth Every 12 (Twelve) Hours for 6 days, THEN 1 tablet Every 12 (Twelve) Hours for 42 days. Indications: DVT/PE (active thrombosis) 74 tablet 0    atorvastatin (LIPITOR) 40 MG tablet Take 1 tablet by mouth once daily 90 tablet 3    BD Pen Needle Meredith 2nd Gen 32G X 4 MM misc 1 each by Other route Daily.      Cyanocobalamin (VITAMIN B-12 PO) Take 1 tablet by mouth Daily.      esomeprazole (nexIUM) 40 MG capsule TAKE 1 CAPSULE BY MOUTH ONCE DAILY IN THE MORNING BEFORE BREAKFAST 90 capsule 0    glimepiride (AMARYL) 4 MG tablet Take 1 tablet by mouth 2 (Two) Times a Day.      HumaLOG KwikPen 100 UNIT/ML solution pen-injector USE UP TO 25 UNITS SUBCUTANEOUSLY DAILY      hydrochlorothiazide (HYDRODIURIL) 12.5 MG tablet Take 1 tablet by mouth Daily.      Insulin Glargine (TOUJEO SOLOSTAR SC) Inject 18 Units under the skin into the appropriate area as directed Daily.      Insulin Lispro (humaLOG) 100 UNIT/ML injection Inject  under the skin into the appropriate area as directed 3 (Three) Times a Day Before Meals.      irbesartan (AVAPRO) 300 MG tablet Take 1 tablet by mouth once daily 90 tablet 0    meloxicam (MOBIC) 15 MG tablet Take 1 tablet by mouth once daily 90 tablet 0    metFORMIN (GLUCOPHAGE) 500 MG tablet Take 2 tablets by mouth 2 (Two) Times a Day.      metFORMIN (GLUCOPHAGE) 500 MG tablet Take 2 tablets by mouth 2 (Two) Times a Day.      methocarbamol (ROBAXIN) 750 MG tablet Take 1 tablet by mouth 3 (Three) Times a Day As Needed for Muscle Spasms. 30 tablet 0    Potassium 99 MG tablet Take 99 mg by mouth Daily.      sertraline (ZOLOFT) 25 MG tablet Take 1 tablet by mouth Daily. 90 tablet 2    terbinafine (lamiSIL) 250 MG tablet Take 1 tablet by mouth Daily.      Toujeo SoloStar 300 UNIT/ML solution pen-injector injection INJECT 26 UNITS SUBCUTANEOUSLY ONCE DAILY      Trulicity 0.75 MG/0.5ML solution  pen-injector Inject 0.75 mg as directed 1 (One) Time Per Week.      TURMERIC PO Take 1 tablet by mouth Daily.      albuterol sulfate  (90 Base) MCG/ACT inhaler 2 puffs every 6h prn cough/wheezing (Patient not taking: Reported on 4/18/2025) 6.7 g 0    carbamide peroxide (DEBROX) 6.5 % otic solution Administer 5 drops into the left ear 3 (Three) Times a Week. (Patient not taking: Reported on 4/18/2025) 22 mL 0    fluticasone (FLONASE) 50 MCG/ACT nasal spray 2 sprays to each nostril daily (Patient not taking: Reported on 4/18/2025) 9.9 mL 0    lidocaine (LIDODERM) 5 % Place 1 patch on the skin as directed by provider Daily. Remove & Discard patch within 12 hours or as directed by MD (Patient not taking: Reported on 4/18/2025) 30 each 0    meclizine (ANTIVERT) 25 MG tablet Take 1 tablet by mouth 3 (Three) Times a Day As Needed for Dizziness. (Patient not taking: Reported on 4/18/2025) 30 tablet 0    Prasterone, DHEA, (DHEA PO) Take 1 tablet by mouth Daily.      Probiotic Product (PROBIOTIC-10 PO) Take 1 tablet by mouth Daily. (Patient not taking: Reported on 4/18/2025)       No current facility-administered medications on file prior to visit.        ALLERGIES:  No Known Allergies     Social History     Socioeconomic History    Marital status:      Spouse name: Nivia    Number of children: 2   Tobacco Use    Smoking status: Never     Passive exposure: Past    Smokeless tobacco: Never   Vaping Use    Vaping status: Never Used   Substance and Sexual Activity    Alcohol use: Yes     Comment: RARE / 12 beers a yr    Drug use: No    Sexual activity: Defer        Family History   Problem Relation Age of Onset    Intracerebral hemorrhage Mother     Heart attack Father     Diabetes Father     Diabetes Sister     Diabetes Paternal Grandmother         Review of Systems   Constitutional: Negative.    HENT: Negative.     Respiratory: Negative.     Cardiovascular: Negative.    Gastrointestinal: Negative.   "  Genitourinary: Negative.    Neurological: Negative.    Hematological: Negative.    Psychiatric/Behavioral: Negative.          Objective     Vitals:    04/18/25 1322   BP: 106/62   Pulse: 84   Temp: 98.3 °F (36.8 °C)   TempSrc: Oral   SpO2: 95%   Weight: 93.1 kg (205 lb 3.2 oz)   Height: 185.4 cm (72.99\")   PainSc: 0-No pain         4/18/2025     1:23 PM   Current Status   ECOG score 0       Physical Exam    CONSTITUTIONAL: pleasant well-developed adult man  HEENT: no icterus, no thrush, moist membranes  LYMPH: no cervical or supraclavicular lad  CV: RRR, S1S2, no murmur  RESP: cta bilat, no wheezing, no rales  GI: soft, non-tender, no splenomegaly, +bs  MUSC: no edema, normal gait  NEURO: alert and oriented x3, normal strength  PSYCH: normal mood and affect  Exam is unchanged-4/18/2025    RECENT LABS:  Hematology WBC   Date Value Ref Range Status   03/26/2025 6.26 3.40 - 10.80 10*3/mm3 Final   03/11/2024 4.5 3.4 - 10.8 x10E3/uL Final   12/22/2020 6.20 4.5 - 11.0 10*3/uL Final     RBC   Date Value Ref Range Status   03/26/2025 3.57 (L) 4.14 - 5.80 10*6/mm3 Final   03/11/2024 3.74 (L) 4.14 - 5.80 x10E6/uL Final   12/22/2020 4.34 (L) 4.5 - 5.9 10*6/uL Final     Hemoglobin   Date Value Ref Range Status   03/26/2025 10.0 (L) 13.0 - 17.7 g/dL Final   12/22/2020 13.4 (L) 13.5 - 17.5 g/dL Final     Hematocrit   Date Value Ref Range Status   03/26/2025 31.9 (L) 37.5 - 51.0 % Final   12/22/2020 41.3 41.0 - 53.0 % Final     Platelets   Date Value Ref Range Status   03/26/2025 252 140 - 450 10*3/mm3 Final   12/22/2020 199 140 - 440 10*3/uL Final        Lab Results   Component Value Date    GLUCOSE 275 (H) 03/26/2025    BUN 22 03/26/2025    CREATININE 1.02 03/26/2025    EGFR 76.6 03/26/2025    BCR 21.6 03/26/2025    K 4.9 03/26/2025    CO2 24.6 03/26/2025    CALCIUM 9.4 03/26/2025    ALBUMIN 4.0 03/24/2025    BILITOT 0.4 03/24/2025    AST 15 03/24/2025    ALT 18 03/24/2025       Assessment & Plan   *Nonocclusive pulmonary " thromboemboli distal right main pulmonary artery/right upper/lower interlobar arteries  Patient had an injury to the left foot and ankle 3/26/2023 after a log rolled onto the foot and ankle but no fracture  No other preceding hospitalizations, surgeries, long distance travel, infections  No family history of thrombosis  CT abdomen/pelvis no convincing evidence of malignancy, narrowing at the descending colon possibly physiologic but will need a follow-up colonoscopy in a few months once PE stabilized  Doppler ultrasound lower extremities showed chronic right lower extremity superficial thrombophlebitis, acute left mid femoral thrombosis, chronic left posterior tibial thrombosis, chronic left lower extremity superficial thrombophlebitis small saphenous-suspect he had initial thrombosis after previous ankle injury  The patient has completed 6 months of anticoagulation    *Right superficial vein thrombosis right greater saphenous incidentally noted/asymptomatic during vein mapping for CABG-week 5/6 of anticoagulation with Eliquis under recommendation of Dr. Cline vascular surgery  Hypercoagulable profile performed negative for factor V Leiden, prothrombin gene mutation, deficiencies of protein C, protein S or Antithrombin  Negative lupus anticoagulant, negative beta-2 glycoprotein 1, negative anticardiolipin except minimally elevated IgM of 18 which is likely not of clinical significance     *Comorbidities diabetes and hyperlipidemia     Hematology plan/recommendations:  Continue Eliquis 5 mg every 12 hours to complete 6 weeks of anticoagulation for SVT of the right greater saphenous vein  Repeat venous duplex of the right lower extremity next week  Hypercoagulable profile reviewed with the patient and his wife with no significant abnormalities identified  The patient will need DVT prophylaxis until fully ambulatory after his upcoming CABG procedure

## 2025-04-23 ENCOUNTER — HOSPITAL ENCOUNTER (OUTPATIENT)
Dept: CARDIOLOGY | Facility: HOSPITAL | Age: 75
Discharge: HOME | End: 2025-04-23
Admitting: INTERNAL MEDICINE
Payer: MEDICARE

## 2025-04-23 DIAGNOSIS — M79.604 PAIN AND SWELLING OF RIGHT LOWER EXTREMITY: ICD-10-CM

## 2025-04-23 DIAGNOSIS — M79.89 PAIN AND SWELLING OF RIGHT LOWER EXTREMITY: ICD-10-CM

## 2025-04-23 LAB
BH CV LOW VAS RIGHT GREATER SAPH AK VESSEL: 1
BH CV LOW VAS RIGHT LESSER SAPH VESSEL: 1
BH CV LOWER VASCULAR LEFT COMMON FEMORAL AUGMENT: NORMAL
BH CV LOWER VASCULAR LEFT COMMON FEMORAL COMPETENT: NORMAL
BH CV LOWER VASCULAR LEFT COMMON FEMORAL COMPRESS: NORMAL
BH CV LOWER VASCULAR LEFT COMMON FEMORAL PHASIC: NORMAL
BH CV LOWER VASCULAR LEFT COMMON FEMORAL SPONT: NORMAL
BH CV LOWER VASCULAR RIGHT COMMON FEMORAL AUGMENT: NORMAL
BH CV LOWER VASCULAR RIGHT COMMON FEMORAL COMPETENT: NORMAL
BH CV LOWER VASCULAR RIGHT COMMON FEMORAL COMPRESS: NORMAL
BH CV LOWER VASCULAR RIGHT COMMON FEMORAL PHASIC: NORMAL
BH CV LOWER VASCULAR RIGHT COMMON FEMORAL SPONT: NORMAL
BH CV LOWER VASCULAR RIGHT DISTAL FEMORAL COMPRESS: NORMAL
BH CV LOWER VASCULAR RIGHT GASTRONEMIUS COMPRESS: NORMAL
BH CV LOWER VASCULAR RIGHT GREATER SAPH AK COMPRESS: NORMAL
BH CV LOWER VASCULAR RIGHT GREATER SAPH AK THROMBUS: NORMAL
BH CV LOWER VASCULAR RIGHT GREATER SAPH BK COMPRESS: NORMAL
BH CV LOWER VASCULAR RIGHT LESSER SAPH COMPRESS: NORMAL
BH CV LOWER VASCULAR RIGHT LESSER SAPH THROMBUS: NORMAL
BH CV LOWER VASCULAR RIGHT MID FEMORAL AUGMENT: NORMAL
BH CV LOWER VASCULAR RIGHT MID FEMORAL COMPETENT: NORMAL
BH CV LOWER VASCULAR RIGHT MID FEMORAL COMPRESS: NORMAL
BH CV LOWER VASCULAR RIGHT MID FEMORAL PHASIC: NORMAL
BH CV LOWER VASCULAR RIGHT MID FEMORAL SPONT: NORMAL
BH CV LOWER VASCULAR RIGHT PERONEAL COMPRESS: NORMAL
BH CV LOWER VASCULAR RIGHT POPLITEAL AUGMENT: NORMAL
BH CV LOWER VASCULAR RIGHT POPLITEAL COMPETENT: NORMAL
BH CV LOWER VASCULAR RIGHT POPLITEAL COMPRESS: NORMAL
BH CV LOWER VASCULAR RIGHT POPLITEAL PHASIC: NORMAL
BH CV LOWER VASCULAR RIGHT POPLITEAL SPONT: NORMAL
BH CV LOWER VASCULAR RIGHT POSTERIOR TIBIAL COMPRESS: NORMAL
BH CV LOWER VASCULAR RIGHT PROFUNDA FEMORAL COMPRESS: NORMAL
BH CV LOWER VASCULAR RIGHT PROXIMAL FEMORAL COMPRESS: NORMAL
BH CV LOWER VASCULAR RIGHT SAPHENOFEMORAL JUNCTION COMPRESS: NORMAL

## 2025-04-23 PROCEDURE — 93971 EXTREMITY STUDY: CPT

## 2025-04-24 ENCOUNTER — RESULTS FOLLOW-UP (OUTPATIENT)
Dept: ONCOLOGY | Facility: CLINIC | Age: 75
End: 2025-04-24
Payer: MEDICARE

## 2025-04-25 ENCOUNTER — OFFICE VISIT (OUTPATIENT)
Age: 75
End: 2025-04-25
Payer: MEDICARE

## 2025-04-25 VITALS
BODY MASS INDEX: 27.22 KG/M2 | DIASTOLIC BLOOD PRESSURE: 70 MMHG | OXYGEN SATURATION: 97 % | HEIGHT: 72 IN | SYSTOLIC BLOOD PRESSURE: 122 MMHG | HEART RATE: 62 BPM | WEIGHT: 201 LBS

## 2025-04-25 DIAGNOSIS — I25.118 CORONARY ARTERY DISEASE OF NATIVE ARTERY OF NATIVE HEART WITH STABLE ANGINA PECTORIS: Primary | ICD-10-CM

## 2025-04-25 DIAGNOSIS — I10 ESSENTIAL HYPERTENSION: ICD-10-CM

## 2025-04-25 DIAGNOSIS — E78.2 MIXED HYPERLIPIDEMIA: ICD-10-CM

## 2025-04-25 DIAGNOSIS — I80.01 THROMBOPHLEBITIS OF SUPERFICIAL VEINS OF RIGHT LOWER EXTREMITY: ICD-10-CM

## 2025-04-25 NOTE — PROGRESS NOTES
Subjective:     Encounter Date:04/25/2025      Patient ID: Marysol Bennett is a 74 y.o. male.    Chief Complaint:  CAD    HPI:   74 y.o. male, seen by Dr Yoo, with hypertension, hyperlipidemia, diabetes, history of provoked PE, recently found multivessel CAD, superficial vein thrombosis of the right greater saphenous vein who presents for follow-up.  Patient was seen by Dr. Yoo as an urgent visit for exertional left arm and jaw discomfort.  Stress echo revealed apical and anterolateral wall ischemia despite submaximal heart rate as well as abnormal EKG. He was found to have severe multivessel CAD including mid LAD, OM1, and RCA.  He was admitted and plan was initially for CABG however he was found to have an acute superficial vein thrombosis involving the right great saphenous vein from mid thigh to the saphenofemoral junction.  Also found to have chronic superficial vein thrombosis and wall thickening along the right and left small saphenous veins. He was started on DOAC with plans of 6 week of therapy prior to consideration of CABG.  He presents today for follow-up and has been doing well.  He continues to have easy fatigue with exertion but denies chest pain or shortness of breath.  He had a follow-up duplex 2 days ago and this revealed chronic superficial thrombophlebitis of the right greater saphenous vein. CABG is scheduled for May 7.    The following portions of the patient's history were reviewed and updated as appropriate: allergies, current medications, past family history, past medical history, past social history, past surgical history and problem list.     REVIEW OF SYSTEMS:   All systems reviewed.  Pertinent positives identified in HPI.  All other systems are negative.    Past Medical History:   Diagnosis Date    Arthritis     Diverticulitis     DVT (deep venous thrombosis) 2023    LEFT LEG, LOG ROLLED ONTO ANKLE AND CAUSED BLOOD CLOTS    GERD (gastroesophageal reflux disease)     Hyperlipidemia      Hypertension     Pulmonary embolism 2023    RIGHT    Type 2 diabetes mellitus     Vertigo        Family History   Problem Relation Age of Onset    Intracerebral hemorrhage Mother     Heart attack Father     Diabetes Father     Diabetes Sister     Diabetes Paternal Grandmother        Social History     Socioeconomic History    Marital status:      Spouse name: Nivia    Number of children: 2   Tobacco Use    Smoking status: Never     Passive exposure: Past    Smokeless tobacco: Never   Vaping Use    Vaping status: Never Used   Substance and Sexual Activity    Alcohol use: Yes     Comment: RARE / 12 beers a yr    Drug use: No    Sexual activity: Defer       No Known Allergies    Past Surgical History:   Procedure Laterality Date    APPENDECTOMY  1977    CARDIAC CATHETERIZATION N/A 3/24/2025    Procedure: Coronary angiography;  Surgeon: Jai Black MD;  Location:  [facility] CATH INVASIVE LOC[facility];  Service: Cardiovascular;  Laterality: N/A;    CARDIAC CATHETERIZATION N/A 3/24/2025    Procedure: Left heart cath;  Surgeon: Jai Black MD;  Location:  [facility] C[facility] INVASIVE [facility];  Service: Cardiovascular;  Laterality: N/A;    CARDIAC CATHETERIZATION N/A 3/24/2025    Procedure: Resting Full Cycle Ratio-Procedure not performed;  Surgeon: Jai Black MD;  Location:  [facility] CATH INVASIVE LOC[facility];  Service: Cardiovascular;  Laterality: N/A;    COLONOSCOPY      COLONOSCOPY N/A 08/11/2023    Procedure: COLONOSCOPY INTO CECUM;  Surgeon: Jefferson Rouse Jr., MD;  Location: [facility] ENDOSCOPY;  Service: General;  Laterality: N/A;  PRE:  SCREENING /    POST: DIVERTICULOSIS, HEMORRHOIDS    HAND SURGERY Left     KNEE ARTHROSCOPY Right     KNEE CARTILAGE SURGERY Left     ROTATOR CUFF REPAIR Left 2005    TOTAL HIP ARTHROPLASTY Right 2005    TOTAL SHOULDER REVERSE ARTHROPLASTY Left 10/2020       Procedures       Objective:         Vitals:    04/25/25 1244   BP: 122/70   Pulse: 62   SpO2: 97%       PHYSICAL EXAM:  GEN: well  appearing, in NAD   HEENT: NCAT, EOMI, moist mucus membranes   Respiratory: CTAB, no wheezes, rales or rhonchi  CV: normal rate, regular rhythm, normal S1, S2, no murmurs, rubs, gallops, +2 radial pulses b/l  GI: soft, nontender, nondistended  MSK: no edema  Skin: no rash, warm, dry  Heme/Lymph: no bruising or bleeding  Neuro: Alert and Oriented x 3, grossly normal motor function        Assessment:         (I25.118) Coronary artery disease of native artery of native heart with stable angina pectoris    (I10) Essential hypertension    (E78.2) Mixed hyperlipidemia    (I80.01) Thrombophlebitis of superficial veins of right lower extremity    74 y.o. male, seen by Dr Yoo, with hypertension, hyperlipidemia, diabetes, history of provoked PE, recently found multivessel CAD, superficial vein thrombosis of the right greater saphenous vein who presents for follow-up.        Plan:       #CAD  Multivessel CAD including LAD, OM1, RCA.  Scheduled for CABG on May 7.  This was delayed due to saphenous vein thrombophlebitis.  Repeat duplex 2 days ago with chronic superficial thrombophlebitis of the right greater saphenous vein but with normal left sided veins.  - Continue atorvastatin 40 mg daily  - CABG scheduled for May 7    #Hypertension  Currently well-controlled.  - Continue irbesartan 300 mg daily, HCTZ 12.5 mg daily    #Superficial thrombophlebitis  On a 6-week course of apixaban.    Lavell Dunaway MD, thank you very much for referring this kind patient to me. Please call me with any questions or concerns. I will see the patient again in the office post CABG.         Jai Fisher MD, [facility], [facility]  04/25/25  [facility]    Outpatient Encounter Medications as of 4/25/2025   Medication Sig Dispense Refill    acetaminophen (TYLENOL) 650 MG 8 hr tablet Take 1 tablet by mouth Every 8 (Eight) Hours As Needed for Mild Pain.      apixaban (ELIQUIS) 5 MG tablet tablet Take 1 tablet by mouth 2 (Two) Times a Day.       atorvastatin (LIPITOR) 40 MG tablet Take 1 tablet by mouth once daily 90 tablet 3    BD Pen Needle Meredith 2nd Gen 32G X 4 MM misc 1 each by Other route Daily.      Cyanocobalamin (VITAMIN B-12 PO) Take 1 tablet by mouth Daily.      esomeprazole (nexIUM) 40 MG capsule TAKE 1 CAPSULE BY MOUTH ONCE DAILY IN THE MORNING BEFORE BREAKFAST 90 capsule 0    glimepiride (AMARYL) 4 MG tablet Take 1 tablet by mouth 2 (Two) Times a Day.      HumaLOG KwikPen 100 UNIT/ML solution pen-injector USE UP TO 25 UNITS SUBCUTANEOUSLY DAILY      hydrochlorothiazide (HYDRODIURIL) 12.5 MG tablet Take 1 tablet by mouth Daily.      Insulin Glargine (TOUJEO SOLOSTAR SC) Inject 18 Units under the skin into the appropriate area as directed Daily.      Insulin Lispro (humaLOG) 100 UNIT/ML injection Inject  under the skin into the appropriate area as directed 3 (Three) Times a Day Before Meals.      irbesartan (AVAPRO) 300 MG tablet Take 1 tablet by mouth once daily 90 tablet 0    meloxicam (MOBIC) 15 MG tablet Take 1 tablet by mouth once daily 90 tablet 0    metFORMIN (GLUCOPHAGE) 500 MG tablet Take 2 tablets by mouth 2 (Two) Times a Day.      metFORMIN (GLUCOPHAGE) 500 MG tablet Take 2 tablets by mouth 2 (Two) Times a Day.      methocarbamol (ROBAXIN) 750 MG tablet Take 1 tablet by mouth 3 (Three) Times a Day As Needed for Muscle Spasms. 30 tablet 0    Potassium 99 MG tablet Take 99 mg by mouth Daily.      Prasterone, DHEA, (DHEA PO) Take 1 tablet by mouth Daily.      sertraline (ZOLOFT) 25 MG tablet Take 1 tablet by mouth Daily. 90 tablet 2    terbinafine (lamiSIL) 250 MG tablet Take 1 tablet by mouth Daily.      Toujeo SoloStar 300 UNIT/ML solution pen-injector injection INJECT 26 UNITS SUBCUTANEOUSLY ONCE DAILY      Trulicity 0.75 MG/0.5ML solution pen-injector Inject 0.75 mg as directed 1 (One) Time Per Week.      TURMERIC PO Take 1 tablet by mouth Daily.      Apixaban Starter Pack tablet therapy pack Take 2 tablets by mouth Every 12 (Twelve)  Hours for 6 days, THEN 1 tablet Every 12 (Twelve) Hours for 42 days. Indications: DVT/PE (active thrombosis) 74 tablet 0     No facility-administered encounter medications on file as of 4/25/2025.

## 2025-04-29 ENCOUNTER — PREP FOR SURGERY (OUTPATIENT)
Dept: OTHER | Facility: HOSPITAL | Age: 75
End: 2025-04-29
Payer: MEDICARE

## 2025-04-29 DIAGNOSIS — I25.118 CORONARY ARTERY DISEASE OF NATIVE ARTERY OF NATIVE HEART WITH STABLE ANGINA PECTORIS: Primary | ICD-10-CM

## 2025-04-29 RX ORDER — CHLORHEXIDINE GLUCONATE ORAL RINSE 1.2 MG/ML
15 SOLUTION DENTAL ONCE
OUTPATIENT
Start: 2025-04-29 | End: 2025-04-29

## 2025-04-29 RX ORDER — CHLORHEXIDINE GLUCONATE ORAL RINSE 1.2 MG/ML
15 SOLUTION DENTAL EVERY 12 HOURS
Status: CANCELLED | OUTPATIENT
Start: 2025-04-29 | End: 2025-04-30

## 2025-05-05 ENCOUNTER — HOSPITAL ENCOUNTER (OUTPATIENT)
Dept: GENERAL RADIOLOGY | Facility: HOSPITAL | Age: 75
Discharge: HOME | End: 2025-05-05
Payer: MEDICARE

## 2025-05-05 ENCOUNTER — ANESTHESIA EVENT (OUTPATIENT)
Dept: PERIOP | Facility: HOSPITAL | Age: 75
End: 2025-05-05
Payer: MEDICARE

## 2025-05-05 ENCOUNTER — PRE-ADMISSION TESTING (OUTPATIENT)
Dept: PREADMISSION TESTING | Facility: HOSPITAL | Age: 75
DRG: 236 | End: 2025-05-05
Payer: MEDICARE

## 2025-05-05 VITALS
TEMPERATURE: 97.6 F | HEART RATE: 83 BPM | OXYGEN SATURATION: 98 % | WEIGHT: 203.6 LBS | BODY MASS INDEX: 27.58 KG/M2 | HEIGHT: 72 IN | RESPIRATION RATE: 18 BRPM | DIASTOLIC BLOOD PRESSURE: 66 MMHG | SYSTOLIC BLOOD PRESSURE: 146 MMHG

## 2025-05-05 DIAGNOSIS — I25.118 CORONARY ARTERY DISEASE OF NATIVE ARTERY OF NATIVE HEART WITH STABLE ANGINA PECTORIS: ICD-10-CM

## 2025-05-05 DIAGNOSIS — R79.1 ABNORMAL COAGULATION PROFILE: ICD-10-CM

## 2025-05-05 DIAGNOSIS — I25.118 CORONARY ARTERY DISEASE OF NATIVE HEART WITH STABLE ANGINA PECTORIS, UNSPECIFIED VESSEL OR LESION TYPE: ICD-10-CM

## 2025-05-05 DIAGNOSIS — R79.9 ABNORMAL FINDING OF BLOOD CHEMISTRY, UNSPECIFIED: ICD-10-CM

## 2025-05-05 DIAGNOSIS — I13.0 HYPERTENSIVE HEART AND CHRONIC KIDNEY DISEASE WITH HEART FAILURE AND STAGE 1 THROUGH STAGE 4 CHRONIC KIDNEY DISEASE, OR UNSPECIFIED CHRONIC KIDNEY DISEASE: ICD-10-CM

## 2025-05-05 LAB
ABO GROUP BLD: NORMAL
ALBUMIN SERPL-MCNC: 4.4 G/DL (ref 3.5–5.2)
ALBUMIN/GLOB SERPL: 1.5 G/DL
ALP SERPL-CCNC: 56 U/L (ref 39–117)
ALT SERPL W P-5'-P-CCNC: 16 U/L (ref 1–41)
ANION GAP SERPL CALCULATED.3IONS-SCNC: 13.8 MMOL/L (ref 5–15)
APTT PPP: 27.5 SECONDS (ref 22.7–35.4)
ARTERIAL PATENCY WRIST A: POSITIVE
AST SERPL-CCNC: 17 U/L (ref 1–40)
ATMOSPHERIC PRESS: 748.3 MMHG
BASE EXCESS BLDA CALC-SCNC: 1.6 MMOL/L (ref 0–2)
BASOPHILS # BLD AUTO: 0.06 10*3/MM3 (ref 0–0.2)
BASOPHILS NFR BLD AUTO: 0.9 % (ref 0–1.5)
BDY SITE: ABNORMAL
BILIRUB SERPL-MCNC: 0.3 MG/DL (ref 0–1.2)
BILIRUB UR QL STRIP: NEGATIVE
BLD GP AB SCN SERPL QL: NEGATIVE
BUN SERPL-MCNC: 30 MG/DL (ref 8–23)
BUN/CREAT SERPL: 22.4 (ref 7–25)
CALCIUM SPEC-SCNC: 9.9 MG/DL (ref 8.6–10.5)
CHLORIDE SERPL-SCNC: 102 MMOL/L (ref 98–107)
CHOLEST SERPL-MCNC: 126 MG/DL (ref 0–200)
CLARITY UR: CLEAR
CLOSE TME COLL+ADP + EPINEP PNL BLD: 92 % (ref 86–100)
CO2 SERPL-SCNC: 26.2 MMOL/L (ref 22–29)
COLOR UR: YELLOW
CREAT SERPL-MCNC: 1.34 MG/DL (ref 0.76–1.27)
DEPRECATED RDW RBC AUTO: 47.4 FL (ref 37–54)
DEVICE COMMENT: ABNORMAL
EGFRCR SERPLBLD CKD-EPI 2021: 55.2 ML/MIN/1.73
EOSINOPHIL # BLD AUTO: 0.3 10*3/MM3 (ref 0–0.4)
EOSINOPHIL NFR BLD AUTO: 4.6 % (ref 0.3–6.2)
ERYTHROCYTE [DISTWIDTH] IN BLOOD BY AUTOMATED COUNT: 15.3 % (ref 12.3–15.4)
GLOBULIN UR ELPH-MCNC: 3 GM/DL
GLUCOSE SERPL-MCNC: 169 MG/DL (ref 65–99)
GLUCOSE UR STRIP-MCNC: ABNORMAL MG/DL
HBA1C MFR BLD: 7.7 % (ref 4.8–5.6)
HCO3 BLDA-SCNC: 26.4 MMOL/L (ref 22–28)
HCT VFR BLD AUTO: 29.4 % (ref 37.5–51)
HDLC SERPL-MCNC: 57 MG/DL (ref 40–60)
HEMODILUTION: NO
HGB BLD-MCNC: 9 G/DL (ref 13–17.7)
HGB UR QL STRIP.AUTO: NEGATIVE
IMM GRANULOCYTES # BLD AUTO: 0.02 10*3/MM3 (ref 0–0.05)
IMM GRANULOCYTES NFR BLD AUTO: 0.3 % (ref 0–0.5)
INR PPP: 1.04 (ref 0.9–1.1)
KETONES UR QL STRIP: NEGATIVE
LDLC SERPL CALC-MCNC: 50 MG/DL (ref 0–100)
LDLC/HDLC SERPL: 0.86 {RATIO}
LEUKOCYTE ESTERASE UR QL STRIP.AUTO: NEGATIVE
LYMPHOCYTES # BLD AUTO: 1.82 10*3/MM3 (ref 0.7–3.1)
LYMPHOCYTES NFR BLD AUTO: 27.8 % (ref 19.6–45.3)
MAGNESIUM SERPL-MCNC: 2.1 MG/DL (ref 1.6–2.4)
MCH RBC QN AUTO: 26 PG (ref 26.6–33)
MCHC RBC AUTO-ENTMCNC: 30.6 G/DL (ref 31.5–35.7)
MCV RBC AUTO: 85 FL (ref 79–97)
MODALITY: ABNORMAL
MONOCYTES # BLD AUTO: 0.53 10*3/MM3 (ref 0.1–0.9)
MONOCYTES NFR BLD AUTO: 8.1 % (ref 5–12)
NEUTROPHILS NFR BLD AUTO: 3.81 10*3/MM3 (ref 1.7–7)
NEUTROPHILS NFR BLD AUTO: 58.3 % (ref 42.7–76)
NITRITE UR QL STRIP: NEGATIVE
NRBC BLD AUTO-RTO: 0 /100 WBC (ref 0–0.2)
NT-PROBNP SERPL-MCNC: 177 PG/ML (ref 0–1800)
PCO2 BLDA: 41.6 MM HG (ref 35–45)
PH BLDA: 7.41 PH UNITS (ref 7.35–7.45)
PH UR STRIP.AUTO: 5.5 [PH] (ref 5–8)
PLATELET # BLD AUTO: 315 10*3/MM3 (ref 140–450)
PMV BLD AUTO: 9.2 FL (ref 6–12)
PO2 BLDA: 71.7 MM HG (ref 80–100)
POTASSIUM SERPL-SCNC: 4.9 MMOL/L (ref 3.5–5.2)
PROT SERPL-MCNC: 7.4 G/DL (ref 6–8.5)
PROT UR QL STRIP: NEGATIVE
PROTHROMBIN TIME: 13.5 SECONDS (ref 11.7–14.2)
QT INTERVAL: 394 MS
QTC INTERVAL: 410 MS
RBC # BLD AUTO: 3.46 10*6/MM3 (ref 4.14–5.8)
RH BLD: POSITIVE
SAO2 % BLDCOA: 94.3 % (ref 92–98.5)
SODIUM SERPL-SCNC: 142 MMOL/L (ref 136–145)
SP GR UR STRIP: 1.01 (ref 1–1.03)
T&S EXPIRATION DATE: NORMAL
TOTAL RATE: 16 BREATHS/MINUTE
TRIGL SERPL-MCNC: 100 MG/DL (ref 0–150)
UROBILINOGEN UR QL STRIP: ABNORMAL
VLDLC SERPL-MCNC: 19 MG/DL (ref 5–40)
WBC NRBC COR # BLD AUTO: 6.54 10*3/MM3 (ref 3.4–10.8)

## 2025-05-05 PROCEDURE — 85025 COMPLETE CBC W/AUTO DIFF WBC: CPT

## 2025-05-05 PROCEDURE — 93005 ELECTROCARDIOGRAM TRACING: CPT

## 2025-05-05 PROCEDURE — 85610 PROTHROMBIN TIME: CPT

## 2025-05-05 PROCEDURE — 86901 BLOOD TYPING SEROLOGIC RH(D): CPT

## 2025-05-05 PROCEDURE — 36600 WITHDRAWAL OF ARTERIAL BLOOD: CPT | Performed by: THORACIC SURGERY (CARDIOTHORACIC VASCULAR SURGERY)

## 2025-05-05 PROCEDURE — 83036 HEMOGLOBIN GLYCOSYLATED A1C: CPT

## 2025-05-05 PROCEDURE — 36415 COLL VENOUS BLD VENIPUNCTURE: CPT

## 2025-05-05 PROCEDURE — 93010 ELECTROCARDIOGRAM REPORT: CPT | Performed by: INTERNAL MEDICINE

## 2025-05-05 PROCEDURE — 80053 COMPREHEN METABOLIC PANEL: CPT

## 2025-05-05 PROCEDURE — 85576 BLOOD PLATELET AGGREGATION: CPT

## 2025-05-05 PROCEDURE — 71046 X-RAY EXAM CHEST 2 VIEWS: CPT

## 2025-05-05 PROCEDURE — 82803 BLOOD GASES ANY COMBINATION: CPT | Performed by: THORACIC SURGERY (CARDIOTHORACIC VASCULAR SURGERY)

## 2025-05-05 PROCEDURE — 80061 LIPID PANEL: CPT

## 2025-05-05 PROCEDURE — 83880 ASSAY OF NATRIURETIC PEPTIDE: CPT

## 2025-05-05 PROCEDURE — 81003 URINALYSIS AUTO W/O SCOPE: CPT

## 2025-05-05 PROCEDURE — 86850 RBC ANTIBODY SCREEN: CPT

## 2025-05-05 PROCEDURE — 86923 COMPATIBILITY TEST ELECTRIC: CPT

## 2025-05-05 PROCEDURE — 86900 BLOOD TYPING SEROLOGIC ABO: CPT

## 2025-05-05 PROCEDURE — 85730 THROMBOPLASTIN TIME PARTIAL: CPT

## 2025-05-05 PROCEDURE — 83735 ASSAY OF MAGNESIUM: CPT

## 2025-05-05 RX ORDER — CHLORHEXIDINE GLUCONATE ORAL RINSE 1.2 MG/ML
15 SOLUTION DENTAL 2 TIMES DAILY
COMMUNITY
End: 2025-05-12 | Stop reason: HOSPADM

## 2025-05-05 RX ORDER — CHLORHEXIDINE GLUCONATE ORAL RINSE 1.2 MG/ML
15 SOLUTION DENTAL EVERY 12 HOURS
Status: DISPENSED | OUTPATIENT
Start: 2025-05-05 | End: 2025-05-06

## 2025-05-05 NOTE — ANESTHESIA PREPROCEDURE EVALUATION
Anesthesia Evaluation     no history of anesthetic complications:                Airway   Mallampati: II  Neck ROM: full  no difficulty expected  Dental - normal exam     Pulmonary - normal exam   (+) pulmonary embolism,shortness of breath  (-) COPD, asthma, sleep apnea, not a smoker    PE comment: nonlabored  Cardiovascular - normal exam  Exercise tolerance: good (4-7 METS)    Rhythm: regular  Rate: normal    (+) hypertension 2 medications or greater, CAD, angina, DVT resolved, hyperlipidemia  (-) valvular problems/murmurs, past MI, dysrhythmias    ROS comment: Heart Cath 3/24/25:  Findings:  1. Coronary Artery Anatomy:  Dominance: right dominant  Left Main: Large-caliber vessel that bifurcates into a large caliber LAD and large caliber circumflex.  This vessel is free of disease.  Left Anterior Descending: Large-caliber vessel with a tortuous proximal segment.  There is a long area of 60 to 70% stenosis in the mid LAD.  Distally the vessel tapers and wraps around the apex and has mild diffuse disease.  Circumflex Artery: Large-caliber vessel that leads to a large caliber OM1.  There is a 90% stenosis that extends from the mid circumflex into the ostium of the OM1.  Right Coronary Artery:  Large-caliber vessel with a 50% stenosis in its proximal segment.  There are sequential 90% stenoses in its mid and mid to distal segment.  The distal RCA is medium caliber, free of disease and leads to a medium caliber RPL and small caliber RPDA.  The ostium of the RPDA has a 50% stenosis.     2. Hemodynamics:  Left Ventricle: 121/10 mmHg  Aorta: 123/56/83 mmHg        Conclusions:  1. Severe multivessel CAD.  2. Normal left sided filling pressures.         Neuro/Psych  (+) dizziness/light headedness (vertigo), syncope, psychiatric history Anxiety  (-) seizures, TIA, CVA  GI/Hepatic/Renal/Endo    (+) GERD, diabetes mellitus type 2  (-) liver disease, no renal disease, no thyroid disorder    Musculoskeletal     (+) arthralgias   Abdominal    Substance History      OB/GYN          Other   arthritis,                 Anesthesia Plan    ASA 4     general, [place] and CVL     ([facility]; +/- [place])  intravenous induction   Postoperative Plan: Expected vent after surgery  Anesthetic plan, risks, benefits, and alternatives have been provided, discussed and informed consent has been obtained with: patient and spouse/significant other.    CODE STATUS:

## 2025-05-05 NOTE — DISCHARGE INSTRUCTIONS
Take the following medications the morning of surgery with a small sip of water: AS INSTRUCTED PER CARDIAC NURSE      If you are on prescription narcotic pain medication to control your pain you may also take that medication the morning of surgery.    General Instructions:  Do not eat or drink anything after midnight the night before surgery.  Infants may have breast milk up to four hours before surgery.  Infants drinking formula may drink formula up to six hours before surgery.   Patients who avoid smoking, chewing tobacco and alcohol for 4 weeks prior to surgery have a reduced risk of post-operative complications.  Quit smoking as many days before surgery as you can.  Do not smoke, use chewing tobacco or drink alcohol the day of surgery.   If applicable bring your C-PAP/ BI-PAP machine in with you to preop day of surgery.  Bring any papers given to you in the doctor’s office.  Wear clean comfortable clothes.  Do not wear contact lenses, false eyelashes or make-up.  Bring a case for your glasses.   Bring crutches or walker if applicable.  Remove all piercings.  Leave jewelry and any other valuables at home.  Hair extensions with metal clips must be removed prior to surgery.  The Pre-Admission Testing nurse will instruct you to bring medications if unable to obtain an accurate list in Pre-Admission Testing.    Day of surgery you will need to let the preoperative nurse know the last time you took each of your medications.  To ensure a safe environment for patients and staff, we kindly ask that children under the age of 16 not accompany patients.  If you must bring a dependent child or dependent adult please ensure a responsible adult, other than yourself, is present to supervise them.      If you were given a blood bank ID arm band remember to bring it with you the day of surgery.    Preventing a Surgical Site Infection:  For 2 to 3 days before surgery, avoid shaving with a razor because the razor can irritate skin  and make it easier to develop an infection.    Any areas of open skin can increase the risk of a post-operative wound infection by allowing bacteria to enter and travel throughout the body.  Notify your surgeon if you have any skin wounds / rashes even if it is not near the expected surgical site.  The area will need assessed to determine if surgery should be delayed until it is healed.  The night prior to surgery shower using a fresh bar of anti-bacterial soap (such as Dial) and clean washcloth.  Sleep in a clean bed with clean clothing.  Do not allow pets to sleep with you.  Shower on the morning of surgery using a fresh bar of anti-bacterial soap (such as Dial) and clean washcloth.  Dry with a clean towel and dress in clean clothing.  Ask your surgeon if you will be receiving antibiotics prior to surgery.  Make sure you, your family, and all healthcare providers clean their hands with soap and water or an alcohol based hand  before caring for you or your wound.    Day of surgery:  Your arrival time is approximately two hours before your scheduled surgery time.  Please note if you have an early arrival time the surgery doors do not open before 5:00 AM.  Upon arrival, a Pre-op nurse and Anesthesiologist will review your health history, obtain vital signs, and answer questions you may have.  The only belongings needed at this time will be your home medications and if applicable your C-PAP/BI-PAP machine.  A Pre-op nurse will start an IV and you may receive medication in preparation for surgery, including something to help you relax.      Please be aware that surgery does come with discomfort.  We want to make every effort to control your discomfort so please discuss any uncontrolled symptoms with your nurse.   Your doctor will most likely have prescribed pain medications.      If you are going home after surgery you will receive individualized written care instructions before being discharged.  A  responsible adult must drive you to and from the hospital on the day of your surgery and ideally stay with you through the night.  Discharge prescriptions can be filled by the hospital pharmacy during regular pharmacy hours.  If you are having surgery late in the day/evening your prescription may be e-prescribed to your pharmacy.  Please verify your pharmacy hours or chose a 24 hour pharmacy to avoid not having access to your prescription because your pharmacy has closed for the day.    If you are staying overnight following surgery, you will be transported to your hospital room following the recovery period.  [facility] has all private rooms.    If you have any questions please call Pre-Admission Testing at [phone].  Deductibles and co-payments are collected on the day of service. Please be prepared to pay the required co-pay, deductible or deposit on the day of service as defined by your plan.    Call your surgeon immediately if you experience any of the following symptoms:  Sore Throat  Shortness of Breath or difficulty breathing  Cough  Chills  Body soreness or muscle pain  Headache  Fever  New loss of taste or smell  Do not arrive for your surgery ill.  Your procedure will need to be rescheduled to another time.  You will need to call your physician before the day of surgery to avoid any unnecessary exposure to hospital staff as well as other patients.    CHLORHEXIDINE CLOTH INSTRUCTIONS  The morning of surgery follow these instructions using the Chlorhexidine cloths you've been given.  These steps reduce bacteria on the body.  Do not use the cloths near your eyes, ears mouth, genitalia or on open wounds.  Throw the cloths away after use but do not try to flush them down a toilet.      Open and remove one cloth at a time from the package.    Leave the cloth unfolded and begin the bathing.  Massage the skin with the cloths using gentle pressure to remove bacteria.  Do not scrub harshly.    Follow the steps below with one 2% CHG cloth per area (6 total cloths).  One cloth for neck, shoulders and chest.  One cloth for both arms, hands, fingers and underarms (do underarms last).  One cloth for the abdomen followed by groin.  One cloth for right leg and foot including between the toes.  One cloth for left leg and foot including between the toes.  The last cloth is to be used for the back of the neck, back and buttocks.    Allow the CHG to air dry 3 minutes on the skin which will give it time to work and decrease the chance of irritation.  The skin may feel sticky until it is dry.  Do not rinse with water or any other liquid or you will lose the beneficial effects of the CHG.  If mild skin irritation occurs, do rinse the skin to remove the CHG.  Report this to the nurse at time of admission.  Do not apply lotions, creams, ointments, deodorants or perfumes after using the clothes. Dress in clean clothes before coming to the hospital.    BACTROBAN NASAL OINTMENT  There are many germs normally in your nose. Bactroban is an ointment that will help reduce these germs. Please follow these instructions for Bactroban use:      ____The day before surgery in the evening              Date________    ____The day of surgery in the morning    Date________    **Squirt ½ package of Bactroban Ointment onto a cotton applicator and apply to inside of 1st nostril.  Squirt the remaining Bactroban and apply to the inside of the other nostril.    PERIDEX- ORAL:  Use only if your surgeon has ordered  Use the night before and morning of surgery - Swish, gargle, and spit - do not swallow.

## 2025-05-05 NOTE — H&P
Name: Marysol Bennett ADMIT: (Not on file)   : 1950  PCP: Lavell Dunaway MD    MRN: [id number] LOS: 0 days   AGE/SEX: 74 y.o. male  ROOM: Room/bed info not found     Chief Complaint: Multivessel coronary artery disease    Subjective   History of Present Illness    Patient is a 74 y.o. male with a past medical history including CAD, hx DVT with PE, DM II, HTN, HLD who was seen in consultation on 3/24 by Dr. Grissom. Patient had elective cardiac catheterization which showed patient to have severe MV-CAD. Dr. Grissom recommended elective surgical revascularization. Patient had echo completed 3/21 which showed normal valvular function and normal ventricular function. Patient has had exertional chest pain which relieved at rest. Patient does not smoke and rarely drinks alcohol.    Past Medical History:   Diagnosis Date    Arthritis     Coronary artery disease     Diverticulitis     DVT (deep venous thrombosis)     LEFT LEG, LOG ROLLED ONTO ANKLE AND CAUSED BLOOD CLOTS    GERD (gastroesophageal reflux disease)     Hyperlipidemia     Hypertension     Pulmonary embolism     RIGHT    SOB (shortness of breath)     Type 2 diabetes mellitus     Vertigo      Past Surgical History:   Procedure Laterality Date    APPENDECTOMY      CARDIAC CATHETERIZATION N/A 3/24/2025    Procedure: Coronary angiography;  Surgeon: aJi Black MD;  Location:  [facility];  Service: Cardiovascular;  Laterality: N/A;    CARDIAC CATHETERIZATION N/A 3/24/2025    Procedure: Left heart cath;  Surgeon: Jai Black MD;  Location:  [facility];  Service: Cardiovascular;  Laterality: N/A;    CARDIAC CATHETERIZATION N/A 3/24/2025    Procedure: Resting Full Cycle Ratio-Procedure not performed;  Surgeon: Jai Black MD;  Location:  [facility];  Service: Cardiovascular;  Laterality: N/A;    COLONOSCOPY      COLONOSCOPY N/A 2023    Procedure: COLONOSCOPY INTO CECUM;  Surgeon:  Jefferson Rouse Jr., MD;  Location: [facility] ENDOSCOPY;  Service: General;  Laterality: N/A;  PRE:  SCREENING /    POST: DIVERTICULOSIS, HEMORRHOIDS    HAND SURGERY Left     KNEE ARTHROSCOPY Right     KNEE CARTILAGE SURGERY Left     ROTATOR CUFF REPAIR Left 2005    TOTAL HIP ARTHROPLASTY Right 2005    TOTAL SHOULDER REVERSE ARTHROPLASTY Left 10/2020     Family History   Problem Relation Age of Onset    Intracerebral hemorrhage Mother     Heart attack Father     Diabetes Father     Diabetes Sister     Diabetes Paternal Grandmother     Malig Hyperthermia Neg Hx      Social History     Tobacco Use    Smoking status: Never     Passive exposure: Past    Smokeless tobacco: Never   Vaping Use    Vaping status: Never Used   Substance Use Topics    Alcohol use: Yes     Comment: RARE / 12 beers a yr    Drug use: No     No medications prior to admission.     Allergies:  Patient has no known allergies.    Review of Systems   Constitutional:  Positive for fatigue.   Respiratory:  Positive for shortness of breath.    All other systems reviewed and are negative.       Objective    Vital Signs  Temp:  [97.6 °F (36.4 °C)] 97.6 °F (36.4 °C)  Heart Rate:  [83] 83  Resp:  [18] 18  BP: (146)/(66) 146/66  SpO2:  [98 %] 98 %  on   ;   Device (Oxygen Therapy): room air  There is no height or weight on file to calculate BMI.    Physical Exam  Vitals and nursing note reviewed.   Constitutional:       Appearance: Normal appearance. He is normal weight.   HENT:      Head: Normocephalic and atraumatic.      Right Ear: External ear normal.      Left Ear: External ear normal.      Nose: Nose normal.      Mouth/Throat:      Mouth: Mucous membranes are moist.      Pharynx: Oropharynx is clear.   Eyes:      Extraocular Movements: Extraocular movements intact.      Pupils: Pupils are equal, round, and reactive to light.   Cardiovascular:      Rate and Rhythm: Normal rate and regular rhythm.      Pulses: Normal pulses.      Heart sounds: Normal heart  sounds.   Pulmonary:      Effort: Pulmonary effort is normal.      Breath sounds: Normal breath sounds.   Abdominal:      General: Abdomen is flat. Bowel sounds are normal.   Musculoskeletal:         General: Normal range of motion.      Cervical back: Normal range of motion and neck supple.   Skin:     General: Skin is warm and dry.      Capillary Refill: Capillary refill takes less than 2 seconds.   Neurological:      General: No focal deficit present.      Mental Status: He is alert and oriented to person, place, and time.   Psychiatric:         Mood and Affect: Mood normal.         Behavior: Behavior normal.         Results Review:   I reviewed the patient's new clinical results.    WBC WBC   Date Value Ref Range Status   05/05/2025 6.54 3.40 - 10.80 10*3/mm3 Final      HGB Hemoglobin   Date Value Ref Range Status   05/05/2025 9.0 (L) 13.0 - 17.7 g/dL Final      HCT Hematocrit   Date Value Ref Range Status   05/05/2025 29.4 (L) 37.5 - 51.0 % Final      Platelets Platelets   Date Value Ref Range Status   05/05/2025 315 140 - 450 10*3/mm3 Final        PT/INR:    Protime   Date Value Ref Range Status   05/05/2025 13.5 11.7 - 14.2 Seconds Final   /  INR   Date Value Ref Range Status   05/05/2025 1.04 0.90 - 1.10 Final       Sodium Sodium   Date Value Ref Range Status   05/05/2025 142 136 - 145 mmol/L Final      Potassium Potassium   Date Value Ref Range Status   05/05/2025 4.9 3.5 - 5.2 mmol/L Final      Chloride Chloride   Date Value Ref Range Status   05/05/2025 102 98 - 107 mmol/L Final      Bicarbonate CO2   Date Value Ref Range Status   05/05/2025 26.2 22.0 - 29.0 mmol/L Final      BUN BUN   Date Value Ref Range Status   05/05/2025 30 (H) 8 - 23 mg/dL Final      Creatinine Creatinine   Date Value Ref Range Status   05/05/2025 1.34 (H) 0.76 - 1.27 mg/dL Final      Calcium Calcium   Date Value Ref Range Status   05/05/2025 9.9 8.6 - 10.5 mg/dL Final      Magnesium Magnesium   Date Value Ref Range Status    05/05/2025 2.1 1.6 - 2.4 mg/dL Final          Assessment & Plan       Coronary artery disease of native heart with stable angina pectoris    Abnormal findings on diagnostic imaging of heart and coronary circulation      Assessment & Plan    Multivessel CAD  Type 2 diabetes  Hypertension  Hyperlipidemia  History of recent DVT  History of pulmonary embolism    Saw patient for the first time today in PAT, all questions/concerns answered with verbalized understanding. Patient to not eat/drink/take medications after midnight the day of surgery. Pre-ops reviewed and listed below.     Pre-ops:   Creatinine -- 1.34 this morning   Hemoglobin A1c -- 7.7  Lipid panel -- WNL   Platelet aggregation -- 92%  PT/INR -- WNL   Urinalysis -- WNL   Vein mapping -- only adequate in a short segment in the left upper thigh   Carotid duplex US -- ANTONINA greater than 70% stenosis, LICA less than 50% stenosis     Will discuss creatinine/vein mapping/ and carotid duplex US with Dr. Grissom.   Tentative plan for CABG Wednesday morning with Dr. Grissmo.     Poonam Butler, BELKYS  05/05/25  15:01 EDT

## 2025-05-06 PROCEDURE — 99291 CRITICAL CARE FIRST HOUR: CPT | Performed by: ANESTHESIOLOGY

## 2025-05-07 ENCOUNTER — ANCILLARY PROCEDURE (OUTPATIENT)
Dept: PERIOP | Facility: HOSPITAL | Age: 75
DRG: 236 | End: 2025-05-07
Payer: MEDICARE

## 2025-05-07 ENCOUNTER — ANESTHESIA (OUTPATIENT)
Dept: PERIOP | Facility: HOSPITAL | Age: 75
End: 2025-05-07
Payer: MEDICARE

## 2025-05-07 ENCOUNTER — HOSPITAL ENCOUNTER (INPATIENT)
Facility: HOSPITAL | Age: 75
LOS: 5 days | Discharge: HOME HEALTH | DRG: 236 | End: 2025-05-12
Attending: THORACIC SURGERY (CARDIOTHORACIC VASCULAR SURGERY) | Admitting: THORACIC SURGERY (CARDIOTHORACIC VASCULAR SURGERY)
Payer: MEDICARE

## 2025-05-07 ENCOUNTER — APPOINTMENT (OUTPATIENT)
Dept: GENERAL RADIOLOGY | Facility: HOSPITAL | Age: 75
DRG: 236 | End: 2025-05-07
Payer: MEDICARE

## 2025-05-07 DIAGNOSIS — Z95.1 S/P CABG (CORONARY ARTERY BYPASS GRAFT): Primary | ICD-10-CM

## 2025-05-07 DIAGNOSIS — R93.1 ABNORMAL FINDINGS ON DIAGNOSTIC IMAGING OF HEART AND CORONARY CIRCULATION: ICD-10-CM

## 2025-05-07 DIAGNOSIS — I25.118 CORONARY ARTERY DISEASE OF NATIVE ARTERY OF NATIVE HEART WITH STABLE ANGINA PECTORIS: ICD-10-CM

## 2025-05-07 DIAGNOSIS — I25.118 CORONARY ARTERY DISEASE OF NATIVE HEART WITH STABLE ANGINA PECTORIS, UNSPECIFIED VESSEL OR LESION TYPE: ICD-10-CM

## 2025-05-07 PROBLEM — I25.10 CAD (CORONARY ARTERY DISEASE), NATIVE CORONARY ARTERY: Status: ACTIVE | Noted: 2025-05-07

## 2025-05-07 LAB
ACT BLD: 101 SECONDS (ref 82–152)
ACT BLD: 129 SECONDS (ref 82–152)
ACT BLD: 297 SECONDS (ref 82–152)
ACT BLD: 337 SECONDS (ref 82–152)
ACT BLD: 365 SECONDS (ref 82–152)
ACT BLD: 394 SECONDS (ref 82–152)
ACT BLD: 464 SECONDS (ref 82–152)
ALBUMIN SERPL-MCNC: 3.6 G/DL (ref 3.5–5.2)
ALBUMIN SERPL-MCNC: 4.3 G/DL (ref 3.5–5.2)
ANION GAP SERPL CALCULATED.3IONS-SCNC: 9 MMOL/L (ref 5–15)
ANION GAP SERPL CALCULATED.3IONS-SCNC: 9.2 MMOL/L (ref 5–15)
APTT PPP: 30.9 SECONDS (ref 22.7–35.4)
ARTERIAL PATENCY WRIST A: ABNORMAL
ATMOSPHERIC PRESS: 748.1 MMHG
ATMOSPHERIC PRESS: 748.2 MMHG
ATMOSPHERIC PRESS: 748.4 MMHG
BASE EXCESS BLDA CALC-SCNC: -0.9 MMOL/L (ref 0–2)
BASE EXCESS BLDA CALC-SCNC: -2 MMOL/L (ref -5–5)
BASE EXCESS BLDA CALC-SCNC: -3 MMOL/L (ref -5–5)
BASE EXCESS BLDA CALC-SCNC: 0.4 MMOL/L (ref 0–2)
BASE EXCESS BLDA CALC-SCNC: 0.4 MMOL/L (ref 0–2)
BASE EXCESS BLDA CALC-SCNC: 1 MMOL/L (ref -5–5)
BASE EXCESS BLDA CALC-SCNC: 1 MMOL/L (ref -5–5)
BASE EXCESS BLDA CALC-SCNC: 2 MMOL/L (ref -5–5)
BASE EXCESS BLDA CALC-SCNC: 5 MMOL/L (ref -5–5)
BASOPHILS # BLD AUTO: 0.04 10*3/MM3 (ref 0–0.2)
BASOPHILS NFR BLD AUTO: 0.4 % (ref 0–1.5)
BDY SITE: ABNORMAL
BUN SERPL-MCNC: 31 MG/DL (ref 8–23)
BUN SERPL-MCNC: 32 MG/DL (ref 8–23)
BUN/CREAT SERPL: 22.5 (ref 7–25)
BUN/CREAT SERPL: 22.5 (ref 7–25)
CA-I SERPL ISE-MCNC: 1.26 MMOL/L (ref 1.15–1.35)
CALCIUM SPEC-SCNC: 8.9 MG/DL (ref 8.6–10.5)
CALCIUM SPEC-SCNC: 9 MG/DL (ref 8.6–10.5)
CHLORIDE SERPL-SCNC: 109 MMOL/L (ref 98–107)
CHLORIDE SERPL-SCNC: 110 MMOL/L (ref 98–107)
CO2 BLDA-SCNC: 24 MMOL/L (ref 24–29)
CO2 BLDA-SCNC: 26 MMOL/L (ref 24–29)
CO2 BLDA-SCNC: 28 MMOL/L (ref 24–29)
CO2 BLDA-SCNC: 28 MMOL/L (ref 24–29)
CO2 BLDA-SCNC: 30 MMOL/L (ref 24–29)
CO2 BLDA-SCNC: 30 MMOL/L (ref 24–29)
CO2 SERPL-SCNC: 22 MMOL/L (ref 22–29)
CO2 SERPL-SCNC: 23.8 MMOL/L (ref 22–29)
CREAT SERPL-MCNC: 1.38 MG/DL (ref 0.76–1.27)
CREAT SERPL-MCNC: 1.42 MG/DL (ref 0.76–1.27)
DEPRECATED RDW RBC AUTO: 46 FL (ref 37–54)
DEPRECATED RDW RBC AUTO: 46.1 FL (ref 37–54)
DEVICE COMMENT: ABNORMAL
EGFRCR SERPLBLD CKD-EPI 2021: 51.5 ML/MIN/1.73
EGFRCR SERPLBLD CKD-EPI 2021: 53.3 ML/MIN/1.73
EOSINOPHIL # BLD AUTO: 0.11 10*3/MM3 (ref 0–0.4)
EOSINOPHIL NFR BLD AUTO: 1 % (ref 0.3–6.2)
ERYTHROCYTE [DISTWIDTH] IN BLOOD BY AUTOMATED COUNT: 14.8 % (ref 12.3–15.4)
ERYTHROCYTE [DISTWIDTH] IN BLOOD BY AUTOMATED COUNT: 14.9 % (ref 12.3–15.4)
FIBRINOGEN PPP-MCNC: 239 MG/DL (ref 219–464)
FIBRINOGEN PPP-MCNC: 248 MG/DL (ref 219–464)
GLUCOSE BLDC GLUCOMTR-MCNC: 138 MG/DL (ref 70–130)
GLUCOSE BLDC GLUCOMTR-MCNC: 139 MG/DL (ref 70–130)
GLUCOSE BLDC GLUCOMTR-MCNC: 141 MG/DL (ref 70–130)
GLUCOSE BLDC GLUCOMTR-MCNC: 144 MG/DL (ref 70–130)
GLUCOSE BLDC GLUCOMTR-MCNC: 146 MG/DL (ref 70–130)
GLUCOSE BLDC GLUCOMTR-MCNC: 149 MG/DL (ref 70–130)
GLUCOSE BLDC GLUCOMTR-MCNC: 151 MG/DL (ref 70–130)
GLUCOSE BLDC GLUCOMTR-MCNC: 153 MG/DL (ref 70–130)
GLUCOSE BLDC GLUCOMTR-MCNC: 154 MG/DL (ref 70–130)
GLUCOSE BLDC GLUCOMTR-MCNC: 155 MG/DL (ref 70–130)
GLUCOSE BLDC GLUCOMTR-MCNC: 158 MG/DL (ref 70–130)
GLUCOSE BLDC GLUCOMTR-MCNC: 163 MG/DL (ref 70–130)
GLUCOSE BLDC GLUCOMTR-MCNC: 189 MG/DL (ref 70–130)
GLUCOSE BLDC GLUCOMTR-MCNC: 208 MG/DL (ref 70–130)
GLUCOSE SERPL-MCNC: 137 MG/DL (ref 65–99)
GLUCOSE SERPL-MCNC: 158 MG/DL (ref 65–99)
HCO3 BLDA-SCNC: 23 MMOL/L (ref 22–26)
HCO3 BLDA-SCNC: 23 MMOL/L (ref 22–28)
HCO3 BLDA-SCNC: 24.5 MMOL/L (ref 22–26)
HCO3 BLDA-SCNC: 26.2 MMOL/L (ref 22–26)
HCO3 BLDA-SCNC: 26.3 MMOL/L (ref 22–26)
HCO3 BLDA-SCNC: 26.3 MMOL/L (ref 22–28)
HCO3 BLDA-SCNC: 27.3 MMOL/L (ref 22–28)
HCO3 BLDA-SCNC: 28.2 MMOL/L (ref 22–26)
HCO3 BLDA-SCNC: 29 MMOL/L (ref 22–26)
HCT VFR BLD AUTO: 27.8 % (ref 37.5–51)
HCT VFR BLD AUTO: 31.4 % (ref 37.5–51)
HCT VFR BLDA CALC: 20 % (ref 38–51)
HCT VFR BLDA CALC: 21 % (ref 38–51)
HCT VFR BLDA CALC: 22 % (ref 38–51)
HCT VFR BLDA CALC: 23 % (ref 38–51)
HCT VFR BLDA CALC: 24 % (ref 38–51)
HCT VFR BLDA CALC: 24 % (ref 38–51)
HEMODILUTION: NO
HGB BLD-MCNC: 10.1 G/DL (ref 13–17.7)
HGB BLD-MCNC: 8.8 G/DL (ref 13–17.7)
HGB BLDA-MCNC: 6.8 G/DL (ref 12–17)
HGB BLDA-MCNC: 7.1 G/DL (ref 12–17)
HGB BLDA-MCNC: 7.5 G/DL (ref 12–17)
HGB BLDA-MCNC: 7.8 G/DL (ref 12–17)
HGB BLDA-MCNC: 8.2 G/DL (ref 12–17)
HGB BLDA-MCNC: 8.2 G/DL (ref 12–17)
IMM GRANULOCYTES # BLD AUTO: 0.04 10*3/MM3 (ref 0–0.05)
IMM GRANULOCYTES NFR BLD AUTO: 0.4 % (ref 0–0.5)
INHALED O2 CONCENTRATION: 100 %
INHALED O2 CONCENTRATION: 40 %
INHALED O2 CONCENTRATION: 40 %
INR PPP: 1.27 (ref 0.9–1.1)
LYMPHOCYTES # BLD AUTO: 1.02 10*3/MM3 (ref 0.7–3.1)
LYMPHOCYTES NFR BLD AUTO: 9.2 % (ref 19.6–45.3)
MAGNESIUM SERPL-MCNC: 2.6 MG/DL (ref 1.6–2.4)
MAGNESIUM SERPL-MCNC: 3.2 MG/DL (ref 1.6–2.4)
MCH RBC QN AUTO: 27 PG (ref 26.6–33)
MCH RBC QN AUTO: 27.4 PG (ref 26.6–33)
MCHC RBC AUTO-ENTMCNC: 31.7 G/DL (ref 31.5–35.7)
MCHC RBC AUTO-ENTMCNC: 32.2 G/DL (ref 31.5–35.7)
MCV RBC AUTO: 85.1 FL (ref 79–97)
MCV RBC AUTO: 85.3 FL (ref 79–97)
MODALITY: ABNORMAL
MONOCYTES # BLD AUTO: 0.66 10*3/MM3 (ref 0.1–0.9)
MONOCYTES NFR BLD AUTO: 6 % (ref 5–12)
NEUTROPHILS NFR BLD AUTO: 83 % (ref 42.7–76)
NEUTROPHILS NFR BLD AUTO: 9.22 10*3/MM3 (ref 1.7–7)
NRBC BLD AUTO-RTO: 0 /100 WBC (ref 0–0.2)
O2 A-A PPRESDIFF RESPIRATORY: 0.5 MMHG
O2 A-A PPRESDIFF RESPIRATORY: 0.7 MMHG
O2 A-A PPRESDIFF RESPIRATORY: 0.8 MMHG
PAW @ PEAK INSP FLOW SETTING VENT: 14 CMH2O
PAW @ PEAK INSP FLOW SETTING VENT: 21 CMH2O
PAW @ PEAK INSP FLOW SETTING VENT: 21 CMH2O
PCO2 BLDA: 34.2 MM HG (ref 35–45)
PCO2 BLDA: 43.1 MM HG (ref 35–45)
PCO2 BLDA: 43.5 MM HG (ref 35–45)
PCO2 BLDA: 45.2 MM HG (ref 35–45)
PCO2 BLDA: 47.3 MM HG (ref 35–45)
PCO2 BLDA: 47.3 MM HG (ref 35–45)
PCO2 BLDA: 48.9 MM HG (ref 35–45)
PCO2 BLDA: 54.5 MM HG (ref 35–45)
PCO2 BLDA: 54.7 MM HG (ref 35–45)
PEEP RESPIRATORY: 5 CM[H2O]
PH BLDA: 7.31 PH UNITS (ref 7.35–7.45)
PH BLDA: 7.32 PH UNITS (ref 7.35–7.6)
PH BLDA: 7.34 PH UNITS (ref 7.35–7.6)
PH BLDA: 7.34 PH UNITS (ref 7.35–7.6)
PH BLDA: 7.35 PH UNITS (ref 7.35–7.45)
PH BLDA: 7.35 PH UNITS (ref 7.35–7.6)
PH BLDA: 7.39 PH UNITS (ref 7.35–7.6)
PH BLDA: 7.43 PH UNITS (ref 7.35–7.45)
PH BLDA: 7.44 PH UNITS (ref 7.35–7.6)
PHOSPHATE SERPL-MCNC: 2.9 MG/DL (ref 2.5–4.5)
PHOSPHATE SERPL-MCNC: 3.3 MG/DL (ref 2.5–4.5)
PLATELET # BLD AUTO: 192 10*3/MM3 (ref 140–450)
PLATELET # BLD AUTO: 204 10*3/MM3 (ref 140–450)
PLATELET # BLD AUTO: 234 10*3/MM3 (ref 140–450)
PMV BLD AUTO: 9.4 FL (ref 6–12)
PMV BLD AUTO: 9.7 FL (ref 6–12)
PO2 BLD: 315 MM[HG] (ref 0–500)
PO2 BLD: 396 MM[HG] (ref 0–500)
PO2 BLD: 562 MM[HG] (ref 0–500)
PO2 BLDA: 125.8 MM HG (ref 80–100)
PO2 BLDA: 158.4 MM HG (ref 80–100)
PO2 BLDA: 331 MMHG (ref 80–105)
PO2 BLDA: 38 MMHG (ref 80–105)
PO2 BLDA: 385 MMHG (ref 80–105)
PO2 BLDA: 385 MMHG (ref 80–105)
PO2 BLDA: 393 MMHG (ref 80–105)
PO2 BLDA: 524 MMHG (ref 80–105)
PO2 BLDA: 562.3 MM HG (ref 80–100)
POTASSIUM BLDA-SCNC: 4 MMOL/L (ref 3.5–4.9)
POTASSIUM BLDA-SCNC: 4.1 MMOL/L (ref 3.5–4.9)
POTASSIUM BLDA-SCNC: 4.8 MMOL/L (ref 3.5–4.9)
POTASSIUM BLDA-SCNC: 4.9 MMOL/L (ref 3.5–4.9)
POTASSIUM BLDA-SCNC: 5 MMOL/L (ref 3.5–4.9)
POTASSIUM BLDA-SCNC: 5.3 MMOL/L (ref 3.5–4.9)
POTASSIUM SERPL-SCNC: 4.3 MMOL/L (ref 3.5–5.2)
POTASSIUM SERPL-SCNC: 4.6 MMOL/L (ref 3.5–5.2)
PROTHROMBIN TIME: 15.9 SECONDS (ref 11.7–14.2)
PSV: 8 CMH2O
QT INTERVAL: 402 MS
QTC INTERVAL: 426 MS
RBC # BLD AUTO: 3.26 10*6/MM3 (ref 4.14–5.8)
RBC # BLD AUTO: 3.69 10*6/MM3 (ref 4.14–5.8)
SAO2 % BLDA: 100 % (ref 95–98)
SAO2 % BLDA: 66 % (ref 95–98)
SAO2 % BLDCOA: 100 % (ref 92–98.5)
SAO2 % BLDCOA: 99 % (ref 92–98.5)
SAO2 % BLDCOA: 99.3 % (ref 92–98.5)
SET MECH RESP RATE: 13
SET MECH RESP RATE: 14
SET MECH RESP RATE: 20
SODIUM SERPL-SCNC: 140 MMOL/L (ref 136–145)
SODIUM SERPL-SCNC: 143 MMOL/L (ref 136–145)
TOTAL RATE: 14 BREATHS/MINUTE
TOTAL RATE: 20 BREATHS/MINUTE
VENTILATOR MODE: ABNORMAL
VT ON VENT VENT: 630 ML
VT ON VENT VENT: 630 ML
WBC NRBC COR # BLD AUTO: 10.62 10*3/MM3 (ref 3.4–10.8)
WBC NRBC COR # BLD AUTO: 11.09 10*3/MM3 (ref 3.4–10.8)

## 2025-05-07 PROCEDURE — 33517 CABG ARTERY-VEIN SINGLE: CPT | Performed by: PHYSICIAN ASSISTANT

## 2025-05-07 PROCEDURE — 25010000002 PROPOFOL 10 MG/ML EMULSION: Performed by: ANESTHESIOLOGY

## 2025-05-07 PROCEDURE — 85610 PROTHROMBIN TIME: CPT | Performed by: NURSE PRACTITIONER

## 2025-05-07 PROCEDURE — 25010000002 PAPAVERINE PER 60 MG: Performed by: THORACIC SURGERY (CARDIOTHORACIC VASCULAR SURGERY)

## 2025-05-07 PROCEDURE — 25010000002 VANCOMYCIN 1 G RECONSTITUTED SOLUTION

## 2025-05-07 PROCEDURE — 33517 CABG ARTERY-VEIN SINGLE: CPT | Performed by: THORACIC SURGERY (CARDIOTHORACIC VASCULAR SURGERY)

## 2025-05-07 PROCEDURE — 25010000002 MORPHINE PER 10 MG: Performed by: NURSE PRACTITIONER

## 2025-05-07 PROCEDURE — 85384 FIBRINOGEN ACTIVITY: CPT | Performed by: THORACIC SURGERY (CARDIOTHORACIC VASCULAR SURGERY)

## 2025-05-07 PROCEDURE — 94002 VENT MGMT INPAT INIT DAY: CPT

## 2025-05-07 PROCEDURE — 25010000002 LIDOCAINE 2% SOLUTION: Performed by: ANESTHESIOLOGY

## 2025-05-07 PROCEDURE — 71045 X-RAY EXAM CHEST 1 VIEW: CPT

## 2025-05-07 PROCEDURE — 25010000002 ONDANSETRON PER 1 MG: Performed by: ANESTHESIOLOGY

## 2025-05-07 PROCEDURE — 021009W BYPASS CORONARY ARTERY, ONE ARTERY FROM AORTA WITH AUTOLOGOUS VENOUS TISSUE, OPEN APPROACH: ICD-10-PCS | Performed by: THORACIC SURGERY (CARDIOTHORACIC VASCULAR SURGERY)

## 2025-05-07 PROCEDURE — 82803 BLOOD GASES ANY COMBINATION: CPT

## 2025-05-07 PROCEDURE — 25010000002 METOCLOPRAMIDE PER 10 MG: Performed by: NURSE PRACTITIONER

## 2025-05-07 PROCEDURE — 02100Z9 BYPASS CORONARY ARTERY, ONE ARTERY FROM LEFT INTERNAL MAMMARY, OPEN APPROACH: ICD-10-PCS | Performed by: THORACIC SURGERY (CARDIOTHORACIC VASCULAR SURGERY)

## 2025-05-07 PROCEDURE — 82803 BLOOD GASES ANY COMBINATION: CPT | Performed by: ANESTHESIOLOGY

## 2025-05-07 PROCEDURE — 82948 REAGENT STRIP/BLOOD GLUCOSE: CPT

## 2025-05-07 PROCEDURE — A4648 IMPLANTABLE TISSUE MARKER: HCPCS | Performed by: THORACIC SURGERY (CARDIOTHORACIC VASCULAR SURGERY)

## 2025-05-07 PROCEDURE — 80069 RENAL FUNCTION PANEL: CPT | Performed by: NURSE PRACTITIONER

## 2025-05-07 PROCEDURE — 25010000002 ACETAMINOPHEN 10 MG/ML SOLUTION: Performed by: NURSE PRACTITIONER

## 2025-05-07 PROCEDURE — 25010000002 LIDOCAINE HCL (CARDIAC) 50 MG/5ML SOLUTION PREFILLED SYRINGE

## 2025-05-07 PROCEDURE — 33508 ENDOSCOPIC VEIN HARVEST: CPT | Performed by: THORACIC SURGERY (CARDIOTHORACIC VASCULAR SURGERY)

## 2025-05-07 PROCEDURE — 25810000003 SODIUM CHLORIDE 0.9 % SOLUTION 250 ML FLEX CONT: Performed by: ANESTHESIOLOGY

## 2025-05-07 PROCEDURE — 25010000003 PROTAMINE SULFATE PER 10 MG: Performed by: THORACIC SURGERY (CARDIOTHORACIC VASCULAR SURGERY)

## 2025-05-07 PROCEDURE — P9047 ALBUMIN (HUMAN), 25%, 50ML: HCPCS

## 2025-05-07 PROCEDURE — 25010000002 MAGNESIUM SULFATE PER 500 MG OF MAGNESIUM: Performed by: ANESTHESIOLOGY

## 2025-05-07 PROCEDURE — 63710000001 INSULIN REGULAR HUMAN PER 5 UNITS: Performed by: ANESTHESIOLOGY

## 2025-05-07 PROCEDURE — 93318 ECHO TRANSESOPHAGEAL INTRAOP: CPT | Performed by: ANESTHESIOLOGY

## 2025-05-07 PROCEDURE — 94761 N-INVAS EAR/PLS OXIMETRY MLT: CPT

## 2025-05-07 PROCEDURE — 25010000002 ALBUMIN HUMAN 5% PER 50 ML: Performed by: NURSE PRACTITIONER

## 2025-05-07 PROCEDURE — 25010000002 HEPARIN (PORCINE) PER 1000 UNITS: Performed by: THORACIC SURGERY (CARDIOTHORACIC VASCULAR SURGERY)

## 2025-05-07 PROCEDURE — 02100Z8 BYPASS CORONARY ARTERY, ONE ARTERY FROM RIGHT INTERNAL MAMMARY, OPEN APPROACH: ICD-10-PCS | Performed by: THORACIC SURGERY (CARDIOTHORACIC VASCULAR SURGERY)

## 2025-05-07 PROCEDURE — 25010000002 MAGNESIUM SULFATE IN D5W 1G/100ML (PREMIX) 1-5 GM/100ML-% SOLUTION: Performed by: NURSE PRACTITIONER

## 2025-05-07 PROCEDURE — P9016 RBC LEUKOCYTES REDUCED: HCPCS

## 2025-05-07 PROCEDURE — 85018 HEMOGLOBIN: CPT

## 2025-05-07 PROCEDURE — 25010000002 FENTANYL CITRATE (PF) 50 MCG/ML SOLUTION: Performed by: ANESTHESIOLOGY

## 2025-05-07 PROCEDURE — 33534 CABG ARTERIAL TWO: CPT | Performed by: PHYSICIAN ASSISTANT

## 2025-05-07 PROCEDURE — 25810000003 SODIUM CHLORIDE 0.9 % SOLUTION

## 2025-05-07 PROCEDURE — 25010000002 AMINOCAPROIC ACID PER 5 G: Performed by: ANESTHESIOLOGY

## 2025-05-07 PROCEDURE — 85025 COMPLETE CBC W/AUTO DIFF WBC: CPT | Performed by: NURSE PRACTITIONER

## 2025-05-07 PROCEDURE — C1713 ANCHOR/SCREW BN/BN,TIS/BN: HCPCS | Performed by: THORACIC SURGERY (CARDIOTHORACIC VASCULAR SURGERY)

## 2025-05-07 PROCEDURE — 25010000002 FAMOTIDINE 10 MG/ML SOLUTION: Performed by: ANESTHESIOLOGY

## 2025-05-07 PROCEDURE — 25010000002 AMINOCAPROIC ACID PER 5 G

## 2025-05-07 PROCEDURE — 25010000002 HEPARIN (PORCINE) PER 1000 UNITS: Performed by: ANESTHESIOLOGY

## 2025-05-07 PROCEDURE — 25010000002 CEFAZOLIN PER 500 MG: Performed by: NURSE PRACTITIONER

## 2025-05-07 PROCEDURE — C1751 CATH, INF, PER/CENT/MIDLINE: HCPCS | Performed by: ANESTHESIOLOGY

## 2025-05-07 PROCEDURE — 25010000002 NICARDIPINE 2.5 MG/ML SOLUTION 10 ML VIAL: Performed by: ANESTHESIOLOGY

## 2025-05-07 PROCEDURE — 33508 ENDOSCOPIC VEIN HARVEST: CPT | Performed by: PHYSICIAN ASSISTANT

## 2025-05-07 PROCEDURE — P9041 ALBUMIN (HUMAN),5%, 50ML: HCPCS | Performed by: NURSE PRACTITIONER

## 2025-05-07 PROCEDURE — 82803 BLOOD GASES ANY COMBINATION: CPT | Performed by: NURSE PRACTITIONER

## 2025-05-07 PROCEDURE — 25010000002 HEPARIN (PORCINE) PER 1000 UNITS

## 2025-05-07 PROCEDURE — 82330 ASSAY OF CALCIUM: CPT | Performed by: NURSE PRACTITIONER

## 2025-05-07 PROCEDURE — 25010000002 CEFAZOLIN PER 500 MG: Performed by: THORACIC SURGERY (CARDIOTHORACIC VASCULAR SURGERY)

## 2025-05-07 PROCEDURE — 85347 COAGULATION TIME ACTIVATED: CPT

## 2025-05-07 PROCEDURE — 86900 BLOOD TYPING SEROLOGIC ABO: CPT

## 2025-05-07 PROCEDURE — 94799 UNLISTED PULMONARY SVC/PX: CPT

## 2025-05-07 PROCEDURE — 93010 ELECTROCARDIOGRAM REPORT: CPT | Performed by: STUDENT IN AN ORGANIZED HEALTH CARE EDUCATION/TRAINING PROGRAM

## 2025-05-07 PROCEDURE — 36430 TRANSFUSION BLD/BLD COMPNT: CPT

## 2025-05-07 PROCEDURE — 83735 ASSAY OF MAGNESIUM: CPT | Performed by: NURSE PRACTITIONER

## 2025-05-07 PROCEDURE — 85027 COMPLETE CBC AUTOMATED: CPT | Performed by: NURSE PRACTITIONER

## 2025-05-07 PROCEDURE — 85014 HEMATOCRIT: CPT

## 2025-05-07 PROCEDURE — 25010000002 ALBUMIN HUMAN 25% PER 50 ML

## 2025-05-07 PROCEDURE — 5A1221Z PERFORMANCE OF CARDIAC OUTPUT, CONTINUOUS: ICD-10-PCS | Performed by: THORACIC SURGERY (CARDIOTHORACIC VASCULAR SURGERY)

## 2025-05-07 PROCEDURE — 93005 ELECTROCARDIOGRAM TRACING: CPT | Performed by: NURSE PRACTITIONER

## 2025-05-07 PROCEDURE — 25010000002 MIDAZOLAM PER 1 MG: Performed by: ANESTHESIOLOGY

## 2025-05-07 PROCEDURE — 82947 ASSAY GLUCOSE BLOOD QUANT: CPT

## 2025-05-07 PROCEDURE — 85384 FIBRINOGEN ACTIVITY: CPT | Performed by: NURSE PRACTITIONER

## 2025-05-07 PROCEDURE — 85730 THROMBOPLASTIN TIME PARTIAL: CPT | Performed by: NURSE PRACTITIONER

## 2025-05-07 PROCEDURE — 33534 CABG ARTERIAL TWO: CPT | Performed by: THORACIC SURGERY (CARDIOTHORACIC VASCULAR SURGERY)

## 2025-05-07 PROCEDURE — 06BQ4ZZ EXCISION OF LEFT SAPHENOUS VEIN, PERCUTANEOUS ENDOSCOPIC APPROACH: ICD-10-PCS | Performed by: THORACIC SURGERY (CARDIOTHORACIC VASCULAR SURGERY)

## 2025-05-07 PROCEDURE — 85049 AUTOMATED PLATELET COUNT: CPT | Performed by: THORACIC SURGERY (CARDIOTHORACIC VASCULAR SURGERY)

## 2025-05-07 DEVICE — DEV CLS STERN FIBERTAPE W/BLNT/NDL: Type: IMPLANTABLE DEVICE | Site: CHEST WALL | Status: FUNCTIONAL

## 2025-05-07 DEVICE — SS SUTURE, 4 PER SLEEVE
Type: IMPLANTABLE DEVICE | Site: CHEST WALL | Status: FUNCTIONAL
Brand: MYO/WIRE II

## 2025-05-07 DEVICE — HEMOST ABS SURGIFOAM SZ100 8X12 10MM: Type: IMPLANTABLE DEVICE | Site: CHEST WALL | Status: FUNCTIONAL

## 2025-05-07 DEVICE — CLIP LIGAT VASC HORIZON TI SM/WD RED 24CT: Type: IMPLANTABLE DEVICE | Site: CHEST WALL | Status: FUNCTIONAL

## 2025-05-07 DEVICE — SS SUTURE, 3 PER SLEEVE
Type: IMPLANTABLE DEVICE | Site: CHEST WALL | Status: FUNCTIONAL
Brand: MYO/WIRE II

## 2025-05-07 DEVICE — TEMP PACING WIRE
Type: IMPLANTABLE DEVICE | Site: HEART | Status: FUNCTIONAL
Brand: MYO/WIRE

## 2025-05-07 RX ORDER — ACETAMINOPHEN 325 MG/1
650 TABLET ORAL EVERY 4 HOURS
Status: ACTIVE | OUTPATIENT
Start: 2025-05-07 | End: 2025-05-08

## 2025-05-07 RX ORDER — ALPRAZOLAM 0.25 MG
0.25 TABLET ORAL EVERY 8 HOURS PRN
Status: DISCONTINUED | OUTPATIENT
Start: 2025-05-07 | End: 2025-05-12 | Stop reason: HOSPADM

## 2025-05-07 RX ORDER — SODIUM CHLORIDE 9 MG/ML
INJECTION, SOLUTION INTRAVENOUS CONTINUOUS PRN
Status: DISCONTINUED | OUTPATIENT
Start: 2025-05-07 | End: 2025-05-07 | Stop reason: SURG

## 2025-05-07 RX ORDER — NOREPINEPHRINE BITARTRATE 0.03 MG/ML
.02-.2 INJECTION, SOLUTION INTRAVENOUS CONTINUOUS PRN
Status: DISCONTINUED | OUTPATIENT
Start: 2025-05-07 | End: 2025-05-08

## 2025-05-07 RX ORDER — ACETAMINOPHEN 650 MG/1
650 SUPPOSITORY RECTAL EVERY 4 HOURS PRN
Status: DISCONTINUED | OUTPATIENT
Start: 2025-05-08 | End: 2025-05-12 | Stop reason: HOSPADM

## 2025-05-07 RX ORDER — DEXTROSE MONOHYDRATE 25 G/50ML
10-50 INJECTION, SOLUTION INTRAVENOUS
Status: DISCONTINUED | OUTPATIENT
Start: 2025-05-07 | End: 2025-05-08

## 2025-05-07 RX ORDER — AMOXICILLIN 250 MG
2 CAPSULE ORAL NIGHTLY
Status: DISCONTINUED | OUTPATIENT
Start: 2025-05-08 | End: 2025-05-12 | Stop reason: HOSPADM

## 2025-05-07 RX ORDER — MORPHINE SULFATE 2 MG/ML
1 INJECTION, SOLUTION INTRAMUSCULAR; INTRAVENOUS EVERY 4 HOURS PRN
Status: DISCONTINUED | OUTPATIENT
Start: 2025-05-07 | End: 2025-05-12 | Stop reason: HOSPADM

## 2025-05-07 RX ORDER — CYCLOBENZAPRINE HCL 10 MG
10 TABLET ORAL EVERY 8 HOURS PRN
Status: DISCONTINUED | OUTPATIENT
Start: 2025-05-08 | End: 2025-05-12 | Stop reason: HOSPADM

## 2025-05-07 RX ORDER — PROPOFOL 10 MG/ML
VIAL (ML) INTRAVENOUS AS NEEDED
Status: DISCONTINUED | OUTPATIENT
Start: 2025-05-07 | End: 2025-05-07 | Stop reason: SURG

## 2025-05-07 RX ORDER — NALOXONE HCL 0.4 MG/ML
0.4 VIAL (ML) INJECTION
Status: DISCONTINUED | OUTPATIENT
Start: 2025-05-07 | End: 2025-05-12 | Stop reason: HOSPADM

## 2025-05-07 RX ORDER — ACETAMINOPHEN 325 MG/1
650 TABLET ORAL EVERY 4 HOURS PRN
Status: DISCONTINUED | OUTPATIENT
Start: 2025-05-08 | End: 2025-05-12 | Stop reason: HOSPADM

## 2025-05-07 RX ORDER — HEPARIN SODIUM 1000 [USP'U]/ML
INJECTION, SOLUTION INTRAVENOUS; SUBCUTANEOUS AS NEEDED
Status: DISCONTINUED | OUTPATIENT
Start: 2025-05-07 | End: 2025-05-07 | Stop reason: SURG

## 2025-05-07 RX ORDER — MIDAZOLAM HYDROCHLORIDE 1 MG/ML
INJECTION, SOLUTION INTRAMUSCULAR; INTRAVENOUS AS NEEDED
Status: DISCONTINUED | OUTPATIENT
Start: 2025-05-07 | End: 2025-05-07 | Stop reason: SURG

## 2025-05-07 RX ORDER — ACETAMINOPHEN 160 MG/5ML
650 SOLUTION ORAL EVERY 4 HOURS
Status: ACTIVE | OUTPATIENT
Start: 2025-05-07 | End: 2025-05-08

## 2025-05-07 RX ORDER — MILRINONE LACTATE 0.2 MG/ML
.25-.375 INJECTION, SOLUTION INTRAVENOUS CONTINUOUS PRN
Status: DISCONTINUED | OUTPATIENT
Start: 2025-05-07 | End: 2025-05-08

## 2025-05-07 RX ORDER — KETAMINE HCL IN NACL, ISO-OSM 100MG/10ML
SYRINGE (ML) INJECTION AS NEEDED
Status: DISCONTINUED | OUTPATIENT
Start: 2025-05-07 | End: 2025-05-07 | Stop reason: SURG

## 2025-05-07 RX ORDER — PAPAVERINE HYDROCHLORIDE 30 MG/ML
INJECTION INTRAMUSCULAR; INTRAVENOUS AS NEEDED
Status: DISCONTINUED | OUTPATIENT
Start: 2025-05-07 | End: 2025-05-07 | Stop reason: HOSPADM

## 2025-05-07 RX ORDER — ACETAMINOPHEN 650 MG/1
650 SUPPOSITORY RECTAL EVERY 4 HOURS
Status: ACTIVE | OUTPATIENT
Start: 2025-05-07 | End: 2025-05-08

## 2025-05-07 RX ORDER — MAGNESIUM SULFATE HEPTAHYDRATE 500 MG/ML
INJECTION, SOLUTION INTRAMUSCULAR; INTRAVENOUS AS NEEDED
Status: DISCONTINUED | OUTPATIENT
Start: 2025-05-07 | End: 2025-05-07 | Stop reason: SURG

## 2025-05-07 RX ORDER — ASPIRIN 81 MG/1
81 TABLET ORAL DAILY
Status: DISCONTINUED | OUTPATIENT
Start: 2025-05-08 | End: 2025-05-12 | Stop reason: HOSPADM

## 2025-05-07 RX ORDER — HYDROCODONE BITARTRATE AND ACETAMINOPHEN 5; 325 MG/1; MG/1
2 TABLET ORAL EVERY 4 HOURS PRN
Status: DISCONTINUED | OUTPATIENT
Start: 2025-05-07 | End: 2025-05-12 | Stop reason: HOSPADM

## 2025-05-07 RX ORDER — MORPHINE SULFATE 2 MG/ML
4 INJECTION, SOLUTION INTRAMUSCULAR; INTRAVENOUS
Status: DISCONTINUED | OUTPATIENT
Start: 2025-05-07 | End: 2025-05-08

## 2025-05-07 RX ORDER — NITROGLYCERIN 20 MG/100ML
5-200 INJECTION INTRAVENOUS
Status: DISCONTINUED | OUTPATIENT
Start: 2025-05-07 | End: 2025-05-08

## 2025-05-07 RX ORDER — NITROGLYCERIN 0.4 MG/1
0.4 TABLET SUBLINGUAL
Status: DISCONTINUED | OUTPATIENT
Start: 2025-05-07 | End: 2025-05-12 | Stop reason: HOSPADM

## 2025-05-07 RX ORDER — MAGNESIUM SULFATE 1 G/100ML
1 INJECTION INTRAVENOUS EVERY 8 HOURS
Status: DISPENSED | OUTPATIENT
Start: 2025-05-07 | End: 2025-05-08

## 2025-05-07 RX ORDER — ACETAMINOPHEN 160 MG/5ML
650 SOLUTION ORAL EVERY 4 HOURS PRN
Status: DISCONTINUED | OUTPATIENT
Start: 2025-05-08 | End: 2025-05-12 | Stop reason: HOSPADM

## 2025-05-07 RX ORDER — DEXMEDETOMIDINE HYDROCHLORIDE 4 UG/ML
.2-1.5 INJECTION, SOLUTION INTRAVENOUS
Status: DISCONTINUED | OUTPATIENT
Start: 2025-05-07 | End: 2025-05-08

## 2025-05-07 RX ORDER — PANTOPRAZOLE SODIUM 40 MG/1
40 TABLET, DELAYED RELEASE ORAL
Status: DISCONTINUED | OUTPATIENT
Start: 2025-05-08 | End: 2025-05-09

## 2025-05-07 RX ORDER — ONDANSETRON 2 MG/ML
4 INJECTION INTRAMUSCULAR; INTRAVENOUS EVERY 6 HOURS PRN
Status: DISCONTINUED | OUTPATIENT
Start: 2025-05-07 | End: 2025-05-12 | Stop reason: HOSPADM

## 2025-05-07 RX ORDER — HEPARIN SODIUM 5000 [USP'U]/ML
INJECTION, SOLUTION INTRAVENOUS; SUBCUTANEOUS AS NEEDED
Status: DISCONTINUED | OUTPATIENT
Start: 2025-05-07 | End: 2025-05-07 | Stop reason: HOSPADM

## 2025-05-07 RX ORDER — MAGNESIUM HYDROXIDE 1200 MG/15ML
LIQUID ORAL AS NEEDED
Status: DISCONTINUED | OUTPATIENT
Start: 2025-05-07 | End: 2025-05-07 | Stop reason: HOSPADM

## 2025-05-07 RX ORDER — METOCLOPRAMIDE HYDROCHLORIDE 5 MG/ML
10 INJECTION INTRAMUSCULAR; INTRAVENOUS EVERY 6 HOURS
Status: COMPLETED | OUTPATIENT
Start: 2025-05-07 | End: 2025-05-08

## 2025-05-07 RX ORDER — ALBUMIN HUMAN 50 G/1000ML
1500 SOLUTION INTRAVENOUS AS NEEDED
Status: DISPENSED | OUTPATIENT
Start: 2025-05-07 | End: 2025-05-08

## 2025-05-07 RX ORDER — CHLORHEXIDINE GLUCONATE ORAL RINSE 1.2 MG/ML
15 SOLUTION DENTAL ONCE
Status: COMPLETED | OUTPATIENT
Start: 2025-05-07 | End: 2025-05-07

## 2025-05-07 RX ORDER — DOPAMINE HYDROCHLORIDE 160 MG/100ML
2-20 INJECTION, SOLUTION INTRAVENOUS CONTINUOUS PRN
Status: DISCONTINUED | OUTPATIENT
Start: 2025-05-07 | End: 2025-05-08

## 2025-05-07 RX ORDER — MIDAZOLAM HYDROCHLORIDE 1 MG/ML
2 INJECTION, SOLUTION INTRAMUSCULAR; INTRAVENOUS
Status: DISCONTINUED | OUTPATIENT
Start: 2025-05-07 | End: 2025-05-08

## 2025-05-07 RX ORDER — FAMOTIDINE 10 MG/ML
20 INJECTION, SOLUTION INTRAVENOUS ONCE
Status: COMPLETED | OUTPATIENT
Start: 2025-05-07 | End: 2025-05-07

## 2025-05-07 RX ORDER — FENTANYL CITRATE 50 UG/ML
INJECTION, SOLUTION INTRAMUSCULAR; INTRAVENOUS AS NEEDED
Status: DISCONTINUED | OUTPATIENT
Start: 2025-05-07 | End: 2025-05-07 | Stop reason: SURG

## 2025-05-07 RX ORDER — LIDOCAINE HYDROCHLORIDE 20 MG/ML
INJECTION, SOLUTION INFILTRATION; PERINEURAL AS NEEDED
Status: DISCONTINUED | OUTPATIENT
Start: 2025-05-07 | End: 2025-05-07 | Stop reason: SURG

## 2025-05-07 RX ORDER — BISACODYL 10 MG
10 SUPPOSITORY, RECTAL RECTAL DAILY PRN
Status: DISCONTINUED | OUTPATIENT
Start: 2025-05-08 | End: 2025-05-12 | Stop reason: HOSPADM

## 2025-05-07 RX ORDER — PANTOPRAZOLE SODIUM 40 MG/10ML
40 INJECTION, POWDER, LYOPHILIZED, FOR SOLUTION INTRAVENOUS ONCE
Status: DISCONTINUED | OUTPATIENT
Start: 2025-05-07 | End: 2025-05-09

## 2025-05-07 RX ORDER — POLYETHYLENE GLYCOL 3350 17 G/17G
17 POWDER, FOR SOLUTION ORAL DAILY PRN
Status: DISCONTINUED | OUTPATIENT
Start: 2025-05-07 | End: 2025-05-09

## 2025-05-07 RX ORDER — IBUPROFEN 600 MG/1
1 TABLET ORAL
Status: DISCONTINUED | OUTPATIENT
Start: 2025-05-07 | End: 2025-05-08

## 2025-05-07 RX ORDER — BISACODYL 5 MG/1
10 TABLET, DELAYED RELEASE ORAL DAILY PRN
Status: DISCONTINUED | OUTPATIENT
Start: 2025-05-07 | End: 2025-05-12 | Stop reason: HOSPADM

## 2025-05-07 RX ORDER — OXYCODONE HYDROCHLORIDE 10 MG/1
10 TABLET ORAL EVERY 4 HOURS PRN
Status: DISCONTINUED | OUTPATIENT
Start: 2025-05-07 | End: 2025-05-12 | Stop reason: HOSPADM

## 2025-05-07 RX ORDER — PHENYLEPHRINE HCL IN 0.9% NACL 0.5 MG/5ML
.2-2 SYRINGE (ML) INTRAVENOUS CONTINUOUS PRN
Status: DISCONTINUED | OUTPATIENT
Start: 2025-05-07 | End: 2025-05-08

## 2025-05-07 RX ORDER — SERTRALINE HYDROCHLORIDE 25 MG/1
25 TABLET, FILM COATED ORAL DAILY
Status: DISCONTINUED | OUTPATIENT
Start: 2025-05-07 | End: 2025-05-12 | Stop reason: HOSPADM

## 2025-05-07 RX ORDER — ACETAMINOPHEN 10 MG/ML
1000 INJECTION, SOLUTION INTRAVENOUS EVERY 8 HOURS
Status: COMPLETED | OUTPATIENT
Start: 2025-05-07 | End: 2025-05-08

## 2025-05-07 RX ORDER — ROCURONIUM BROMIDE 10 MG/ML
INJECTION, SOLUTION INTRAVENOUS AS NEEDED
Status: DISCONTINUED | OUTPATIENT
Start: 2025-05-07 | End: 2025-05-07 | Stop reason: SURG

## 2025-05-07 RX ORDER — MEPERIDINE HYDROCHLORIDE 25 MG/ML
25 INJECTION INTRAMUSCULAR; INTRAVENOUS; SUBCUTANEOUS EVERY 4 HOURS PRN
Status: ACTIVE | OUTPATIENT
Start: 2025-05-07 | End: 2025-05-07

## 2025-05-07 RX ORDER — PROTAMINE SULFATE 10 MG/ML
INJECTION, SOLUTION INTRAVENOUS AS NEEDED
Status: DISCONTINUED | OUTPATIENT
Start: 2025-05-07 | End: 2025-05-07 | Stop reason: HOSPADM

## 2025-05-07 RX ORDER — AMINOCAPROIC ACID 250 MG/ML
INJECTION, SOLUTION INTRAVENOUS AS NEEDED
Status: DISCONTINUED | OUTPATIENT
Start: 2025-05-07 | End: 2025-05-07 | Stop reason: SURG

## 2025-05-07 RX ORDER — METOPROLOL TARTRATE 25 MG/1
12.5 TABLET, FILM COATED ORAL EVERY 12 HOURS SCHEDULED
Status: DISCONTINUED | OUTPATIENT
Start: 2025-05-08 | End: 2025-05-11

## 2025-05-07 RX ORDER — ENOXAPARIN SODIUM 100 MG/ML
40 INJECTION SUBCUTANEOUS EVERY 24 HOURS
Status: DISCONTINUED | OUTPATIENT
Start: 2025-05-08 | End: 2025-05-12 | Stop reason: HOSPADM

## 2025-05-07 RX ORDER — METOPROLOL TARTRATE 25 MG/1
12.5 TABLET, FILM COATED ORAL ONCE
Status: DISCONTINUED | OUTPATIENT
Start: 2025-05-07 | End: 2025-05-07

## 2025-05-07 RX ORDER — ATORVASTATIN CALCIUM 20 MG/1
40 TABLET, FILM COATED ORAL NIGHTLY
Status: DISCONTINUED | OUTPATIENT
Start: 2025-05-07 | End: 2025-05-12 | Stop reason: HOSPADM

## 2025-05-07 RX ORDER — ONDANSETRON 2 MG/ML
INJECTION INTRAMUSCULAR; INTRAVENOUS AS NEEDED
Status: DISCONTINUED | OUTPATIENT
Start: 2025-05-07 | End: 2025-05-07 | Stop reason: SURG

## 2025-05-07 RX ORDER — NICOTINE POLACRILEX 4 MG
15 LOZENGE BUCCAL
Status: DISCONTINUED | OUTPATIENT
Start: 2025-05-07 | End: 2025-05-08

## 2025-05-07 RX ADMIN — SODIUM CHLORIDE: 9 INJECTION, SOLUTION INTRAVENOUS at 06:40

## 2025-05-07 RX ADMIN — FENTANYL CITRATE 100 MCG: 0.05 INJECTION, SOLUTION INTRAMUSCULAR; INTRAVENOUS at 07:26

## 2025-05-07 RX ADMIN — SODIUM CHLORIDE 2.5 MG/HR: 9 INJECTION, SOLUTION INTRAVENOUS at 10:10

## 2025-05-07 RX ADMIN — MAGNESIUM SULFATE IN DEXTROSE 1 G: 10 INJECTION, SOLUTION INTRAVENOUS at 19:02

## 2025-05-07 RX ADMIN — CHLORHEXIDINE GLUCONATE 15 ML: 1.2 RINSE ORAL at 06:07

## 2025-05-07 RX ADMIN — METOCLOPRAMIDE 10 MG: 5 INJECTION, SOLUTION INTRAMUSCULAR; INTRAVENOUS at 17:28

## 2025-05-07 RX ADMIN — ONDANSETRON 4 MG: 2 INJECTION INTRAMUSCULAR; INTRAVENOUS at 11:13

## 2025-05-07 RX ADMIN — AMINOCAPROIC ACID 10 G: 250 INJECTION, SOLUTION INTRAVENOUS at 10:25

## 2025-05-07 RX ADMIN — OXYCODONE HYDROCHLORIDE 10 MG: 10 TABLET ORAL at 16:46

## 2025-05-07 RX ADMIN — FENTANYL CITRATE 100 MCG: 0.05 INJECTION, SOLUTION INTRAMUSCULAR; INTRAVENOUS at 07:49

## 2025-05-07 RX ADMIN — OXYCODONE HYDROCHLORIDE 10 MG: 10 TABLET ORAL at 20:49

## 2025-05-07 RX ADMIN — INSULIN HUMAN 2.6 UNITS/HR: 1 INJECTION, SOLUTION INTRAVENOUS at 08:12

## 2025-05-07 RX ADMIN — FENTANYL CITRATE 200 MCG: 0.05 INJECTION, SOLUTION INTRAMUSCULAR; INTRAVENOUS at 06:58

## 2025-05-07 RX ADMIN — ROCURONIUM BROMIDE 100 MG: 10 INJECTION INTRAVENOUS at 06:58

## 2025-05-07 RX ADMIN — ROCURONIUM BROMIDE 50 MG: 10 INJECTION INTRAVENOUS at 09:02

## 2025-05-07 RX ADMIN — ALPRAZOLAM 0.25 MG: 0.25 TABLET ORAL at 23:18

## 2025-05-07 RX ADMIN — LIDOCAINE HYDROCHLORIDE 100 MG: 20 INJECTION, SOLUTION INFILTRATION; PERINEURAL at 06:58

## 2025-05-07 RX ADMIN — INSULIN HUMAN 6 UNITS: 100 INJECTION, SOLUTION PARENTERAL at 08:00

## 2025-05-07 RX ADMIN — FENTANYL CITRATE 100 MCG: 0.05 INJECTION, SOLUTION INTRAMUSCULAR; INTRAVENOUS at 10:39

## 2025-05-07 RX ADMIN — ALBUMIN (HUMAN) 500 ML: 12.5 INJECTION, SOLUTION INTRAVENOUS at 20:01

## 2025-05-07 RX ADMIN — METOCLOPRAMIDE 10 MG: 5 INJECTION, SOLUTION INTRAMUSCULAR; INTRAVENOUS at 12:38

## 2025-05-07 RX ADMIN — ALBUMIN (HUMAN) 500 ML: 12.5 INJECTION, SOLUTION INTRAVENOUS at 16:04

## 2025-05-07 RX ADMIN — ACETAMINOPHEN 1000 MG: 10 INJECTION INTRAVENOUS at 12:38

## 2025-05-07 RX ADMIN — HEPARIN SODIUM 5000 UNITS: 1000 INJECTION, SOLUTION INTRAVENOUS; SUBCUTANEOUS at 08:55

## 2025-05-07 RX ADMIN — MUPIROCIN 1 APPLICATION: 20 OINTMENT TOPICAL at 20:01

## 2025-05-07 RX ADMIN — FENTANYL CITRATE 100 MCG: 0.05 INJECTION, SOLUTION INTRAMUSCULAR; INTRAVENOUS at 09:02

## 2025-05-07 RX ADMIN — MORPHINE SULFATE 1 MG: 2 INJECTION, SOLUTION INTRAMUSCULAR; INTRAVENOUS at 22:13

## 2025-05-07 RX ADMIN — HEPARIN SODIUM 30000 UNITS: 1000 INJECTION, SOLUTION INTRAVENOUS; SUBCUTANEOUS at 08:43

## 2025-05-07 RX ADMIN — AMINOCAPROIC ACID 10 G: 250 INJECTION, SOLUTION INTRAVENOUS at 08:55

## 2025-05-07 RX ADMIN — ATORVASTATIN CALCIUM 40 MG: 20 TABLET, FILM COATED ORAL at 20:01

## 2025-05-07 RX ADMIN — SODIUM CHLORIDE 2000 MG: 900 INJECTION INTRAVENOUS at 07:15

## 2025-05-07 RX ADMIN — FAMOTIDINE 20 MG: 10 INJECTION INTRAVENOUS at 06:07

## 2025-05-07 RX ADMIN — CEFAZOLIN 2 G: 2 INJECTION, POWDER, FOR SOLUTION INTRAMUSCULAR; INTRAVENOUS at 17:52

## 2025-05-07 RX ADMIN — SODIUM CHLORIDE 2000 MG: 900 INJECTION INTRAVENOUS at 10:20

## 2025-05-07 RX ADMIN — Medication 50 MG: at 07:30

## 2025-05-07 RX ADMIN — PROPOFOL 100 MG: 10 INJECTION, EMULSION INTRAVENOUS at 06:58

## 2025-05-07 RX ADMIN — ACETAMINOPHEN 1000 MG: 10 INJECTION INTRAVENOUS at 18:33

## 2025-05-07 RX ADMIN — MAGNESIUM SULFATE HEPTAHYDRATE 2 G: 500 INJECTION, SOLUTION INTRAMUSCULAR; INTRAVENOUS at 10:39

## 2025-05-07 RX ADMIN — METOCLOPRAMIDE 10 MG: 5 INJECTION, SOLUTION INTRAMUSCULAR; INTRAVENOUS at 23:18

## 2025-05-07 RX ADMIN — MIDAZOLAM 2 MG: 1 INJECTION INTRAMUSCULAR; INTRAVENOUS at 06:45

## 2025-05-07 NOTE — CONSULTS
CVICU Intensivist Progress Note    HPI/Chief Complaint: Mr. Marysol Bennett is a 74M w/multi vessel CAD from [facility] on 3/24. He went to the OR on 5/7 w/Dr. Grissom for a CABG x 3V and was Admitted to CVICU postooperatively.     PMHx: anxiety, DVT w/PE, Diabetes Type 2, Hypertension, Hyperlipidemia; 3/21 ECHO: LV function normal, EF 62%, RV function normal    Procedure: CABG x 3V (DIPIKA - LAD, LIMA - OM2, SVG - RCA) by Dr. Grissom. Intra-op NAMAN showed LV 60%, RV normal, no significant valvulopathy. Post-bypass echo was unchanged other than small right sided pericardial effusion without compression. He did not require shock, was hemoconcentrated 2.8L and received 300ml cellsaver and 2 prbcs. He was brought to the CVICU on sedation and cardene, paced AOO @ 80 bpm.    Hospital Course: 5/7 Operative Course    Daily Assessment and Plan 5/7: Brought to the CVICU on cardene, titrating to maintain normotension. Underlying rhythm sinus in the 60s, continue pacing. Gentle volume resuscitation to address mild hypovolemia. Making appropriate urine. Acute kidney injury with Cr up to 1.4 post-op but no acidosis or significant electrolyte abnormalities. Will ensure optimal perfusion, avoid nephrotoxins. Acute blood loss anemia, received prbcs in OR w/ Hgb only 8.8 on post-op labs. Will f/up with 4 hour labs to evaluate the need for further transfusion. Chest tube output relatively low.      Neuro: propofol/precedex for sedation in immediate post-op period. Wean when hemodynamically appropriate for SBT. Pain control w/ morphine vs. Apap vs. Oxy prn. Restart home sertraline    CV: CAD s/p CABG, poor vein quality so received bilateral IMAs. Norepi vs. Cardene as needed to maintain normotension. ASA + statin, BB in AM if appropriate   Rhythm: epicardial wires in place    Pulm: maintain mechanical ventilation for now, adjust vent settings for appropriate oxygenation/ventilation.    Renal: appropriate UOP at this time, continue volume  resuscitation as needed. Replete electrolytes.    GI: start bowel regimen to avoid constipation/ileus. NPO for now, advance diet once appropriate. PPI for prophy and h/o GERD    Endo: stress induced hyperglycemia and DM, A1C 7.7 on Toujeo and Trulicity at home. Insulin drip for goal euglycemia, transition when appropriate    ID: periop cefazolin for prophylaxis    Heme:h/o PE in the past, on vein mapping found to have superficial venous thrombosis that was treated w/ Eliquis 5mg q12 hrs x6 weeks pre-op. Acute blood loss anemia, received 2 prbcs and cellsaver in OR. Follow chest tube output closely, transfuse as necessary. Start Lovenox POD#1 for prophy    Physical Exam:  Lungs clear to auscultation, no wheezes/rhonchi  Abdomen soft, nondistended  Feet warm, 1+ pulses    acetaminophen, 1,000 mg, Intravenous, Q8H  acetaminophen, 650 mg, Oral, Q4H   Or  acetaminophen, 650 mg, Oral, Q4H   Or  acetaminophen, 650 mg, Rectal, Q4H  [START ON 5/8/2025] aspirin, 81 mg, Oral, Daily  atorvastatin, 40 mg, Oral, Nightly  ceFAZolin, 2 g, Intravenous, Q8H  [START ON 5/8/2025] enoxaparin sodium, 40 mg, Subcutaneous, Q24H  magnesium sulfate, 1 g, Intravenous, Q8H  metoclopramide, 10 mg, Intravenous, Q6H  [START ON 5/8/2025] metoprolol tartrate, 12.5 mg, Oral, Q12H  mupirocin, 1 Application, Each Nare, BID  pantoprazole, 40 mg, Intravenous, Once   Followed by  [START ON 5/8/2025] pantoprazole, 40 mg, Oral, Q AM  [START ON 5/8/2025] senna-docusate sodium, 2 tablet, Oral, Nightly  sertraline, 25 mg, Oral, Daily       ------------------------------------------------------------------------------------------------------------------------------------------------------  Prophy: HOB elevated + oral care + scds + mckeon stat lock + PPI + subglottic suction + no chemical DVT prophy 2/2 risk for bleeding  Code Status: full      Critical Care time: 36 mins on 5/7/25 by Kasia Moses MD for the assessment and treatment of: interpretation of  serial cardiac output measurements, evaluation of CXR, interpretation of serial blood gases, ventilator management, acute kidney injury

## 2025-05-07 NOTE — ANESTHESIA PROCEDURE NOTES
Central Line      Patient reassessed immediately prior to procedure    Patient location during procedure: OR  Indications: central pressure monitoring and vascular access  Staff  Anesthesiologist: Leonides Manzo MD  Preanesthetic Checklist  Completed: patient identified, IV checked, site marked, risks and benefits discussed, surgical consent, monitors and equipment checked, pre-op evaluation and timeout performed  Central Line Prep  Sterile Tech:gloves, cap, gown, mask and sterile barriers  Prep: chloraprep  no medical exclusion documented for following all elements of maximal sterile barrier technique  Patient monitoring: blood pressure monitoring, continuous pulse oximetry and EKG  Central Line Procedure  Laterality:right  Location:internal jugular  Catheter Type:MAC  Catheter Size:9 Fr  Guidance:ultrasound guided  PROCEDURE NOTE/ULTRASOUND INTERPRETATION.  Using ultrasound guidance the potential vascular sites for insertion of the catheter were visualized to determine the patency of the vessel to be used for vascular access.  After selecting the appropriate site for insertion, the needle was visualized under ultrasound being inserted into the internal jugular vein, followed by ultrasound confirmation of wire and catheter placement. There were no abnormalities seen on ultrasound; an image was taken; and the patient tolerated the procedure with no complications. Images: still images obtained, printed/placed on chart  Assessment  Post procedure:biopatch applied, line sutured and occlusive dressing applied  Assessement:blood return through all ports and free fluid flow  Complications:no  Patient Tolerance:patient tolerated the procedure well with no apparent complications

## 2025-05-07 NOTE — OP NOTE
[facility] CARDIAC SURGERY OP NOTE    Preop Diagnosis: Severe coronary artery disease.  Recent DVT in the right leg treated  Bilateral carotid stenoses asymptomatic  Postop Diagnosis: Same, anemia of chronic disease.    Chronic Comorbid Conditions relative to CABG include:  Cardiovascular: Coronary Artery Disease, Hyperlipidemia, Hypertension, and Carotid Stenosis anemia.  Respiratory: None  Endocrine: None   Nephrology: None  Hematology: None   Other: None    Indications: This patient was seen for surgery about a month ago.  On the vein mapping he had a clot in the saphenofemoral junction on the right side.  For this reason he was treated and came back for surgery.  The veins in the left leg were pitiful.  Operation was advisable to prolong life and relieve symptoms.The  calculated STS Risk score was discussed with the patient and family. All risks and alternatives were discussed with the patient and family.  Counseling was done regarding abuse of tobacco, alcohol and drugs as needed.  A discussion about advanced directive was done with the patient. They understand and wish to proceed.    Procedure: CABG x 3.  Asymptomatic carotid stenosis.  STARR's.  DIPIKA to LAD.  LIMA to OM1.  Vein graft to RCA.  Temporary cardiopulmonary bypass.  Antegrade cold blood cardioplegia.  Transesophageal echo anesthesia.  Endoscopic vein harvest of the left greater saphenous vein.    Surgeon: Kg Grissom MD    Assistant: Assistant: Damon Bedoya PA-C was responsible for performing the following activities: Cardiac Surgery First assist, Endoscopic Vein [place] if needed for CABG,  surgical wound closure and their skilled assistance was necessary for the success of this case.     Anesthesia: GET    Findings : Body mass index is 27.99 kg/m².  LV function was normal at 60%.  The valves were normal.  The vein was harvested from the left leg and we had a short segment that was about 3.5 mm in diameter.   The rest of it was not usable.  I did not want to use the right leg because of the DVT.  For this reason we use both mammary's and they were 2.5 mm with excellent blood flow.  Luckily the length was great and we were able to reach the distal LAD.  He had a horrible proximal coronary disease.  The RCA beyond this horrible calcium was 2.5 mm.  The marginal branch was 2.5 mm and the LAD was bypassed distally where it was 2 mm.  We kept him perfusion pressure higher because of his bilateral carotid disease.    Operative Procedure: A primary median sternotomy was made while both STARR's were dissected off chest wall with the cautery at low voltage.  The left greater saphenous vein was harvested with the endoscope.  Cardiopulmonary bypass was then established for 61 minutes drifting to 34 °C at appropriate flow rates.  The aorta was crossclamped 42 minutes and we gave a liter of antegrade cold blood cardioplegia and repeated doses every 10 to 15 minutes to good effect.  The first vein was sewn to the RCA with 7-0 Prolene and then the length was adjusted in the proximal done with 6-0 Prolene and marked with a washer.  The LIMA was then brought to an incision in the left side of the pericardium and sewn to the marginal branch with 7-0 Prolene.  The DIPIKA was brought through an incision in the right side the pericardium and sewn to the distal LAD with 7-0 Prolene.  Then with strong suction on the aortic needle vent the cross-clamp was released.  The patient was rewarmed and cardiopulmonary bypass was weaned discontinued.  Decannulation was effected and usual devices were placed.  Hemostasis was obtained.  Protamine was administered.  He was anemic when he came in the room at 8.4 and dropped to 6 so we gave 2 units of packed cells that were washed in the Cell Saver at the end.  5 chest tubes were inserted.  We applied vancomycin enriched platelet rich plasma.  The sternum was closed with stainless steel wires and 2 fiber tapes  for secure closure.  All the grafts lay nicely and all the anastomoses were hemostatic.  The sponge, needle and instrument counts were noted to be correct.  The fascia, soft tissues and skin were closed as usual.    Complications: None    Tubes: 5    Epicardial Wires: 3    Blood Loss:  Minimal, 340 ml Cell Saver Reinfused    Ultrafiltration: 2800 ml     Bypass Time: 61 min    Aortic cross-clamp time: 42 min    Specimen: None    Condition: Good      Patient Care Team:  Lavell Dunaway MD as PCP - General (Family Medicine)  Papo Gale MD as Consulting Physician (Hematology and Oncology)  Rachael Reynaga MD as Referring Physician (Hospitalist)        Kg Grissom MD  5/7/2025  11:09 EDT

## 2025-05-07 NOTE — ANESTHESIA PROCEDURE NOTES
Arterial Line      Patient reassessed immediately prior to procedure    Patient location during procedure: OR   Line placed for hemodynamic monitoring and ABGs/Labs/ISTAT.  Performed By   Anesthesiologist: Leonides Manzo MD   Preanesthetic Checklist  Completed: patient identified, IV checked, site marked, risks and benefits discussed, surgical consent, monitors and equipment checked, pre-op evaluation and timeout performed  Arterial Line Prep    Sterile Tech: cap, gloves, sterile barriers and mask  Prep: ChloraPrep  Patient monitoring: EKG, continuous pulse oximetry and blood pressure monitoring  Arterial Line Procedure   Laterality:left  Location:  radial artery  Catheter size: 20 G   Guidance: palpation technique  Number of attempts: 1  Successful placement: yes Images: still images not obtained  Post Assessment   Dressing Type: wrist guard applied, secured with tape and occlusive dressing applied.   Complications no  Circ/Move/Sens Assessment: normal and unchanged.   Patient Tolerance: patient tolerated the procedure well with no apparent complications

## 2025-05-07 NOTE — PLAN OF CARE
Goal Outcome Evaluation:              Outcome Evaluation: Received from OR S/P CABG x3.  Vent weaning per protocol without difficulty.  VSS/hemodynamics stable.  Chest tubes maintained.  Oxycodone for pain with good results.

## 2025-05-07 NOTE — ANESTHESIA PROCEDURE NOTES
Diagnostic IntraOp [place]    Procedure Performed: Diagnostic IntraOp [place]       Start Time:        End Time:      Preanesthesia Checklist:  Patient identified, IV assessed, risks and benefits discussed, monitors and equipment assessed, procedure being performed at surgeon's request and anesthesia consent obtained.    General Procedure Information  Diagnostic Indications for Echo:  assessment of ascending aorta and assessment of surgical repair  Physician Requesting Echo: Jr Kg Grissom MD  Location performed:  OR  Intubated  Bite block placed  Heart visualized  Probe Insertion:  Easy  Probe Type:  Multiplane  Modalities:  Color flow mapping, continuous wave Doppler and pulse wave Doppler    Echocardiographic and Doppler Measurements    Ventricles    Right Ventricle:  Cavity size normal.  Hypertrophy not present.  Thrombus not present.  Global function normal.    Left Ventricle:  Cavity size normal.  Thrombus not present.  Global Function normal.  Ejection Fraction 60%.          Valves    Aortic Valve:  Annulus normal.  Stenosis not present.  Regurgitation absent.  Leaflets normal.  Leaflet motions normal.      Mitral Valve:  Stenosis not present.  Regurgitation trace.  Leaflets normal.  Leaflet motions normal.      Tricuspid Valve:  Annulus normal.  Stenosis not present.  Regurgitation trace.  Leaflet motions normal.    Pulmonic Valve:  Annulus normal.  Stenosis not present.  Regurgitation trace.        Aorta    Ascending Aorta:  Size normal.  Dissection not present.  Plaque thickness less than 3 mm.  Mobile plaque not present.    Aortic Arch:  Size normal.  Dissection not present.  Plaque thickness less than 3 mm.  Mobile plaque not present.    Descending Aorta:  Size normal.  Dissection not present.  Plaque thickness less than 3 mm.  Mobile plaque not present.    Other Aortic Findings:       Ascending aorta = 3.6 cm    Atria    Right Atrium:  Size dilated.  Spontaneous echo contrast not present.  Thrombus not  present.  Tumor not present.  Device not present.      Left Atrium:  Size dilated.  Spontaneous echo contrast not present.  Thrombus not present.  Tumor not present.  Device not present.    Left atrial appendage normal.      Septa        Ventricular Septum:  Intra-ventricular septum morphology normal.          Other Findings  Pericardium:  normal  Pleural Effusion:  none  Pulmonary Arteries:  normal      Anesthesia Information  Performed Personally  Anesthesiologist:  Leonides Manzo MD      Echocardiogram Comments:       Post CABG x 3,  LV systolic function is normal. EF is 60-65%.  RV systolic function is normal.    Valvular function is unchanged.    Aorta is intact.  There is a small Rt sided pericardial effusion with no compression.

## 2025-05-07 NOTE — ANESTHESIA PROCEDURE NOTES
Airway  Reason: elective    Date/Time: 5/7/2025 7:00 AM  Airway not difficult    General Information and Staff    Patient location during procedure: OR  Anesthesiologist: Leonides Manzo MD    Indications and Patient Condition  Indications for airway management: airway protection    Preoxygenated: yes  MILS maintained throughout    Mask difficulty assessment: 1 - vent by mask    Final Airway Details    Final airway type: endotracheal airway      Successful airway: ETT  Cuffed: yes   Successful intubation technique: direct laryngoscopy  Endotracheal tube insertion site: oral  Blade: Radha  Blade size: 3  ETT size (mm): 8.0  Cormack-Lehane Classification: grade I - full view of glottis  Placement verified by: chest auscultation and capnometry   Measured from: lips  ETT/EBT  to lips (cm): 23  Number of attempts at approach: 1  Assessment: lips, teeth, and gum same as pre-op and atraumatic intubation

## 2025-05-08 ENCOUNTER — APPOINTMENT (OUTPATIENT)
Dept: GENERAL RADIOLOGY | Facility: HOSPITAL | Age: 75
DRG: 236 | End: 2025-05-08
Payer: MEDICARE

## 2025-05-08 LAB
ALBUMIN SERPL-MCNC: 4.3 G/DL (ref 3.5–5.2)
ANION GAP SERPL CALCULATED.3IONS-SCNC: 11.3 MMOL/L (ref 5–15)
BASOPHILS # BLD AUTO: 0.02 10*3/MM3 (ref 0–0.2)
BASOPHILS NFR BLD AUTO: 0.2 % (ref 0–1.5)
BH BB BLOOD EXPIRATION DATE: NORMAL
BH BB BLOOD EXPIRATION DATE: NORMAL
BH BB BLOOD TYPE BARCODE: 5100
BH BB BLOOD TYPE BARCODE: 5100
BH BB DISPENSE STATUS: NORMAL
BH BB DISPENSE STATUS: NORMAL
BH BB PRODUCT CODE: NORMAL
BH BB PRODUCT CODE: NORMAL
BH BB UNIT NUMBER: NORMAL
BH BB UNIT NUMBER: NORMAL
BUN SERPL-MCNC: 30 MG/DL (ref 8–23)
BUN/CREAT SERPL: 22.2 (ref 7–25)
CA-I SERPL ISE-MCNC: 1.2 MMOL/L (ref 1.15–1.35)
CALCIUM SPEC-SCNC: 8.6 MG/DL (ref 8.6–10.5)
CHLORIDE SERPL-SCNC: 108 MMOL/L (ref 98–107)
CO2 SERPL-SCNC: 21.7 MMOL/L (ref 22–29)
CREAT SERPL-MCNC: 1.35 MG/DL (ref 0.76–1.27)
CROSSMATCH INTERPRETATION: NORMAL
CROSSMATCH INTERPRETATION: NORMAL
DEPRECATED RDW RBC AUTO: 44.9 FL (ref 37–54)
EGFRCR SERPLBLD CKD-EPI 2021: 54.8 ML/MIN/1.73
EOSINOPHIL # BLD AUTO: 0.01 10*3/MM3 (ref 0–0.4)
EOSINOPHIL NFR BLD AUTO: 0.1 % (ref 0.3–6.2)
ERYTHROCYTE [DISTWIDTH] IN BLOOD BY AUTOMATED COUNT: 14.8 % (ref 12.3–15.4)
GLUCOSE BLDC GLUCOMTR-MCNC: 144 MG/DL (ref 70–130)
GLUCOSE BLDC GLUCOMTR-MCNC: 147 MG/DL (ref 70–130)
GLUCOSE BLDC GLUCOMTR-MCNC: 161 MG/DL (ref 70–130)
GLUCOSE BLDC GLUCOMTR-MCNC: 170 MG/DL (ref 70–130)
GLUCOSE BLDC GLUCOMTR-MCNC: 219 MG/DL (ref 70–130)
GLUCOSE BLDC GLUCOMTR-MCNC: 221 MG/DL (ref 70–130)
GLUCOSE BLDC GLUCOMTR-MCNC: 284 MG/DL (ref 70–130)
GLUCOSE SERPL-MCNC: 154 MG/DL (ref 65–99)
HCT VFR BLD AUTO: 26.2 % (ref 37.5–51)
HGB BLD-MCNC: 8.3 G/DL (ref 13–17.7)
IMM GRANULOCYTES # BLD AUTO: 0.04 10*3/MM3 (ref 0–0.05)
IMM GRANULOCYTES NFR BLD AUTO: 0.4 % (ref 0–0.5)
INR PPP: 1.3 (ref 0.9–1.1)
LYMPHOCYTES # BLD AUTO: 0.68 10*3/MM3 (ref 0.7–3.1)
LYMPHOCYTES NFR BLD AUTO: 7.2 % (ref 19.6–45.3)
MAGNESIUM SERPL-MCNC: 2.8 MG/DL (ref 1.6–2.4)
MCH RBC QN AUTO: 26.9 PG (ref 26.6–33)
MCHC RBC AUTO-ENTMCNC: 31.7 G/DL (ref 31.5–35.7)
MCV RBC AUTO: 84.8 FL (ref 79–97)
MONOCYTES # BLD AUTO: 0.69 10*3/MM3 (ref 0.1–0.9)
MONOCYTES NFR BLD AUTO: 7.3 % (ref 5–12)
NEUTROPHILS NFR BLD AUTO: 7.96 10*3/MM3 (ref 1.7–7)
NEUTROPHILS NFR BLD AUTO: 84.8 % (ref 42.7–76)
NRBC BLD AUTO-RTO: 0 /100 WBC (ref 0–0.2)
PHOSPHATE SERPL-MCNC: 4.5 MG/DL (ref 2.5–4.5)
PLATELET # BLD AUTO: 170 10*3/MM3 (ref 140–450)
PMV BLD AUTO: 9.6 FL (ref 6–12)
POTASSIUM SERPL-SCNC: 4.1 MMOL/L (ref 3.5–5.2)
PROTHROMBIN TIME: 16.2 SECONDS (ref 11.7–14.2)
QT INTERVAL: 388 MS
QTC INTERVAL: 392 MS
RBC # BLD AUTO: 3.09 10*6/MM3 (ref 4.14–5.8)
SODIUM SERPL-SCNC: 141 MMOL/L (ref 136–145)
UNIT  ABO: NORMAL
UNIT  ABO: NORMAL
UNIT  RH: NORMAL
UNIT  RH: NORMAL
WBC NRBC COR # BLD AUTO: 9.4 10*3/MM3 (ref 3.4–10.8)

## 2025-05-08 PROCEDURE — 25010000002 METOCLOPRAMIDE PER 10 MG: Performed by: NURSE PRACTITIONER

## 2025-05-08 PROCEDURE — 25010000002 CEFAZOLIN PER 500 MG: Performed by: PHYSICIAN ASSISTANT

## 2025-05-08 PROCEDURE — 25010000002 CEFAZOLIN PER 500 MG: Performed by: NURSE PRACTITIONER

## 2025-05-08 PROCEDURE — 63710000001 INSULIN LISPRO (HUMAN) PER 5 UNITS: Performed by: PHYSICIAN ASSISTANT

## 2025-05-08 PROCEDURE — 93010 ELECTROCARDIOGRAM REPORT: CPT | Performed by: INTERNAL MEDICINE

## 2025-05-08 PROCEDURE — 71045 X-RAY EXAM CHEST 1 VIEW: CPT

## 2025-05-08 PROCEDURE — 93005 ELECTROCARDIOGRAM TRACING: CPT | Performed by: NURSE PRACTITIONER

## 2025-05-08 PROCEDURE — 97530 THERAPEUTIC ACTIVITIES: CPT

## 2025-05-08 PROCEDURE — 99222 1ST HOSP IP/OBS MODERATE 55: CPT | Performed by: STUDENT IN AN ORGANIZED HEALTH CARE EDUCATION/TRAINING PROGRAM

## 2025-05-08 PROCEDURE — 85610 PROTHROMBIN TIME: CPT | Performed by: NURSE PRACTITIONER

## 2025-05-08 PROCEDURE — 82330 ASSAY OF CALCIUM: CPT | Performed by: NURSE PRACTITIONER

## 2025-05-08 PROCEDURE — 97110 THERAPEUTIC EXERCISES: CPT

## 2025-05-08 PROCEDURE — 63710000001 INSULIN LISPRO (HUMAN) PER 5 UNITS: Performed by: ANESTHESIOLOGY

## 2025-05-08 PROCEDURE — 63710000001 INSULIN GLARGINE PER 5 UNITS: Performed by: ANESTHESIOLOGY

## 2025-05-08 PROCEDURE — 25010000002 ENOXAPARIN PER 10 MG: Performed by: PHYSICIAN ASSISTANT

## 2025-05-08 PROCEDURE — 85025 COMPLETE CBC W/AUTO DIFF WBC: CPT | Performed by: NURSE PRACTITIONER

## 2025-05-08 PROCEDURE — 25010000002 MAGNESIUM SULFATE IN D5W 1G/100ML (PREMIX) 1-5 GM/100ML-% SOLUTION: Performed by: NURSE PRACTITIONER

## 2025-05-08 PROCEDURE — 97162 PT EVAL MOD COMPLEX 30 MIN: CPT

## 2025-05-08 PROCEDURE — 82948 REAGENT STRIP/BLOOD GLUCOSE: CPT

## 2025-05-08 PROCEDURE — 25010000002 ACETAMINOPHEN 10 MG/ML SOLUTION: Performed by: NURSE PRACTITIONER

## 2025-05-08 PROCEDURE — 83735 ASSAY OF MAGNESIUM: CPT | Performed by: NURSE PRACTITIONER

## 2025-05-08 PROCEDURE — 80069 RENAL FUNCTION PANEL: CPT | Performed by: NURSE PRACTITIONER

## 2025-05-08 PROCEDURE — 25010000002 MORPHINE PER 10 MG: Performed by: NURSE PRACTITIONER

## 2025-05-08 RX ORDER — DEXTROSE MONOHYDRATE 25 G/50ML
25 INJECTION, SOLUTION INTRAVENOUS
Status: DISCONTINUED | OUTPATIENT
Start: 2025-05-08 | End: 2025-05-09

## 2025-05-08 RX ORDER — FUROSEMIDE 40 MG/1
40 TABLET ORAL ONCE
Status: COMPLETED | OUTPATIENT
Start: 2025-05-08 | End: 2025-05-08

## 2025-05-08 RX ORDER — ACETAMINOPHEN 325 MG/1
650 TABLET ORAL EVERY 6 HOURS PRN
Status: DISCONTINUED | OUTPATIENT
Start: 2025-05-08 | End: 2025-05-08

## 2025-05-08 RX ORDER — INSULIN LISPRO 100 [IU]/ML
2-7 INJECTION, SOLUTION INTRAVENOUS; SUBCUTANEOUS
Status: DISCONTINUED | OUTPATIENT
Start: 2025-05-08 | End: 2025-05-09

## 2025-05-08 RX ORDER — IBUPROFEN 600 MG/1
1 TABLET ORAL
Status: DISCONTINUED | OUTPATIENT
Start: 2025-05-08 | End: 2025-05-09

## 2025-05-08 RX ORDER — ACETAMINOPHEN 500 MG
1000 TABLET ORAL EVERY 6 HOURS
Status: DISCONTINUED | OUTPATIENT
Start: 2025-05-08 | End: 2025-05-09

## 2025-05-08 RX ORDER — NICOTINE POLACRILEX 4 MG
15 LOZENGE BUCCAL
Status: DISCONTINUED | OUTPATIENT
Start: 2025-05-08 | End: 2025-05-09

## 2025-05-08 RX ORDER — LIDOCAINE 4 G/G
2 PATCH TOPICAL
Status: DISCONTINUED | OUTPATIENT
Start: 2025-05-08 | End: 2025-05-12 | Stop reason: HOSPADM

## 2025-05-08 RX ORDER — GUAIFENESIN 600 MG/1
1200 TABLET, EXTENDED RELEASE ORAL EVERY 12 HOURS SCHEDULED
Status: DISCONTINUED | OUTPATIENT
Start: 2025-05-08 | End: 2025-05-12 | Stop reason: HOSPADM

## 2025-05-08 RX ORDER — GABAPENTIN 100 MG/1
200 CAPSULE ORAL EVERY 12 HOURS SCHEDULED
Status: DISCONTINUED | OUTPATIENT
Start: 2025-05-08 | End: 2025-05-12 | Stop reason: HOSPADM

## 2025-05-08 RX ORDER — POTASSIUM CHLORIDE 1500 MG/1
20 TABLET, EXTENDED RELEASE ORAL ONCE
Status: COMPLETED | OUTPATIENT
Start: 2025-05-08 | End: 2025-05-08

## 2025-05-08 RX ADMIN — OXYCODONE HYDROCHLORIDE 10 MG: 10 TABLET ORAL at 14:35

## 2025-05-08 RX ADMIN — GABAPENTIN 200 MG: 100 CAPSULE ORAL at 21:05

## 2025-05-08 RX ADMIN — GUAIFENESIN 1200 MG: 600 TABLET, MULTILAYER, EXTENDED RELEASE ORAL at 21:05

## 2025-05-08 RX ADMIN — METOCLOPRAMIDE 10 MG: 5 INJECTION, SOLUTION INTRAMUSCULAR; INTRAVENOUS at 05:14

## 2025-05-08 RX ADMIN — CEFAZOLIN 2 G: 2 INJECTION, POWDER, FOR SOLUTION INTRAMUSCULAR; INTRAVENOUS at 10:54

## 2025-05-08 RX ADMIN — ACETAMINOPHEN 1000 MG: 500 TABLET, FILM COATED ORAL at 16:44

## 2025-05-08 RX ADMIN — GUAIFENESIN 1200 MG: 600 TABLET, MULTILAYER, EXTENDED RELEASE ORAL at 09:40

## 2025-05-08 RX ADMIN — SENNOSIDES AND DOCUSATE SODIUM 2 TABLET: 50; 8.6 TABLET ORAL at 21:04

## 2025-05-08 RX ADMIN — METOPROLOL TARTRATE 12.5 MG: 25 TABLET, FILM COATED ORAL at 21:04

## 2025-05-08 RX ADMIN — MUPIROCIN 1 APPLICATION: 20 OINTMENT TOPICAL at 21:05

## 2025-05-08 RX ADMIN — FUROSEMIDE 40 MG: 40 TABLET ORAL at 16:44

## 2025-05-08 RX ADMIN — CYCLOBENZAPRINE HYDROCHLORIDE 10 MG: 10 TABLET, FILM COATED ORAL at 00:14

## 2025-05-08 RX ADMIN — SERTRALINE HYDROCHLORIDE 25 MG: 25 TABLET ORAL at 09:40

## 2025-05-08 RX ADMIN — MAGNESIUM SULFATE IN DEXTROSE 1 G: 10 INJECTION, SOLUTION INTRAVENOUS at 02:33

## 2025-05-08 RX ADMIN — INSULIN LISPRO 3 UNITS: 100 INJECTION, SOLUTION INTRAVENOUS; SUBCUTANEOUS at 11:00

## 2025-05-08 RX ADMIN — ENOXAPARIN SODIUM 40 MG: 100 INJECTION SUBCUTANEOUS at 19:00

## 2025-05-08 RX ADMIN — LIDOCAINE 2 PATCH: 4 PATCH TOPICAL at 10:28

## 2025-05-08 RX ADMIN — CEFAZOLIN 2 G: 2 INJECTION, POWDER, FOR SOLUTION INTRAMUSCULAR; INTRAVENOUS at 19:00

## 2025-05-08 RX ADMIN — ASPIRIN 81 MG: 81 TABLET, COATED ORAL at 09:40

## 2025-05-08 RX ADMIN — OXYCODONE HYDROCHLORIDE 10 MG: 10 TABLET ORAL at 09:18

## 2025-05-08 RX ADMIN — POTASSIUM CHLORIDE 20 MEQ: 1500 TABLET, EXTENDED RELEASE ORAL at 16:44

## 2025-05-08 RX ADMIN — ACETAMINOPHEN 1000 MG: 500 TABLET, FILM COATED ORAL at 10:52

## 2025-05-08 RX ADMIN — ATORVASTATIN CALCIUM 40 MG: 20 TABLET, FILM COATED ORAL at 21:05

## 2025-05-08 RX ADMIN — HYDROCODONE BITARTRATE AND ACETAMINOPHEN 2 TABLET: 5; 325 TABLET ORAL at 22:00

## 2025-05-08 RX ADMIN — INSULIN LISPRO 3 UNITS: 100 INJECTION, SOLUTION INTRAVENOUS; SUBCUTANEOUS at 16:44

## 2025-05-08 RX ADMIN — INSULIN GLARGINE 15 UNITS: 100 INJECTION, SOLUTION SUBCUTANEOUS at 10:30

## 2025-05-08 RX ADMIN — PANTOPRAZOLE SODIUM 40 MG: 40 TABLET, DELAYED RELEASE ORAL at 06:03

## 2025-05-08 RX ADMIN — CYCLOBENZAPRINE HYDROCHLORIDE 10 MG: 10 TABLET, FILM COATED ORAL at 21:04

## 2025-05-08 RX ADMIN — MORPHINE SULFATE 0.5 MG: 2 INJECTION, SOLUTION INTRAMUSCULAR; INTRAVENOUS at 10:47

## 2025-05-08 RX ADMIN — INSULIN LISPRO 4 UNITS: 100 INJECTION, SOLUTION INTRAVENOUS; SUBCUTANEOUS at 21:05

## 2025-05-08 RX ADMIN — CEFAZOLIN 2 G: 2 INJECTION, POWDER, FOR SOLUTION INTRAMUSCULAR; INTRAVENOUS at 02:07

## 2025-05-08 RX ADMIN — OXYCODONE HYDROCHLORIDE 10 MG: 10 TABLET ORAL at 02:32

## 2025-05-08 RX ADMIN — GABAPENTIN 200 MG: 100 CAPSULE ORAL at 09:40

## 2025-05-08 RX ADMIN — MUPIROCIN 1 APPLICATION: 20 OINTMENT TOPICAL at 09:40

## 2025-05-08 RX ADMIN — ACETAMINOPHEN 1000 MG: 10 INJECTION INTRAVENOUS at 02:33

## 2025-05-08 NOTE — PLAN OF CARE
Goal Outcome Evaluation:  Plan of Care Reviewed With: patient           Outcome Evaluation: Pt is 73 yo male admitted to [facility] for CABGx3 on 5/7/25. Pt lives with spouse, independent with mobility prior to admission. Pt reports chronic issues with L shoulder causing a lot of pain and reports he is unable to use his LUE at all. Pt rates pain at 1/10 but moaning and yelling out throughout session, required a lot of encouragement to attempt mobility. Patient performed sit to stand with minAx2, ambulated very short distance with min/HHAx2. Patient has impairments consisting of generalized post op weakness and pain, decreased activity tolerance and would benefit from skilled PT. d/c plan is home with family assist pending progress.    Anticipated Discharge Disposition (PT): home with assist, home with home health

## 2025-05-08 NOTE — CONSULTS
Date of Hospital Visit: 25  Encounter Provider: Jai Black MD  Place of Service: [facility] CARDIOLOGY  Patient Name: Marysol Bennett  :1950  Referral Provider: Jr Kg Grissom MD    Chief complaint  Multivessel CAD    History of Present Illness  74 y.o. male, seen by Dr Yoo, with hypertension, hyperlipidemia, diabetes, history of provoked PE, recently found multivessel CAD, superficial vein thrombosis of the right greater saphenous vein who presented for elective CABG.  He underwent this yesterday with Dr. Grissom (CABG x 3, DIPIKA to LAD, LIMA to OM, SVG to RCA.) Wife at bedside notes that he is a little out of it because he just had a lot of pain medicines administered.     Past Medical History:   Diagnosis Date    Arthritis     Coronary artery disease     Diverticulitis     DVT (deep venous thrombosis)     LEFT LEG, LOG ROLLED ONTO ANKLE AND CAUSED BLOOD CLOTS    GERD (gastroesophageal reflux disease)     Hyperlipidemia     Hypertension     Pulmonary embolism     RIGHT    SOB (shortness of breath)     Type 2 diabetes mellitus     Vertigo        Past Surgical History:   Procedure Laterality Date    APPENDECTOMY      CARDIAC CATHETERIZATION N/A 3/24/2025    Procedure: Coronary angiography;  Surgeon: Jai Black MD;  Location: [facility] CATH INVASIVE LOCATION;  Service: Cardiovascular;  Laterality: N/A;    CARDIAC CATHETERIZATION N/A 3/24/2025    Procedure: Left heart cath;  Surgeon: Jai Black MD;  [facility];  Service: Cardiovascular;  Laterality: N/A;    CARDIAC CATHETERIZATION N/A 3/24/2025    Procedure: Resting Full Cycle Ratio-Procedure not performed;  Surgeon: Jai Black MD;  Location: [facility] CATH INVASIVE LOCATION;  Service: Cardiovascular;  Laterality: N/A;    COLONOSCOPY      COLONOSCOPY N/A 2023    Procedure: COLONOSCOPY INTO CECUM;  Surgeon: Jefferson Rouse Jr., MD;  Location: [facility] ENDOSCOPY;  Service:  General;  Laterality: N/A;  PRE:  SCREENING /    POST: DIVERTICULOSIS, HEMORRHOIDS    CORONARY ARTERY BYPASS GRAFT N/A 5/7/2025    Procedure: NAMAN PER ANESTHESIA, STERNOTOMY, CORONARY ARTERY BYPASS GRAFTS X 3 USING ENDOSCOPICALLY HARVESTED LEFT GREATER SAPHENOUS VEIN, LEFT AND RIGHT IMAs, PRP;  Surgeon: Jr Kg Grissom MD;  Location: [facility];  Service: Cardiothoracic;  Laterality: N/A;    HAND SURGERY Left     KNEE ARTHROSCOPY Right     KNEE CARTILAGE SURGERY Left     ROTATOR CUFF REPAIR Left 2005    TOTAL HIP ARTHROPLASTY Right 2005    TOTAL SHOULDER REVERSE ARTHROPLASTY Left 10/2020       Medications Prior to Admission   Medication Sig Dispense Refill Last Dose/Taking    atorvastatin (LIPITOR) 40 MG tablet Take 1 tablet by mouth once daily 90 tablet 3 5/6/2025 at  8:00 AM    BD Pen Needle Meredith 2nd Gen 32G X 4 MM misc 1 each by Other route Daily.   Taking    chlorhexidine (PERIDEX) 0.12 % solution Apply 15 mL to the mouth or throat 2 (Two) Times a Day. PREOP   5/7/2025 at  3:00 AM    Cyanocobalamin (VITAMIN B-12 PO) Take 1 tablet by mouth Daily.   5/6/2025 at  8:00 AM    diphenhydrAMINE-acetaminophen (TYLENOL PM)  MG tablet per tablet Take 1 tablet by mouth At Night As Needed for Sleep.   5/5/2025 at  9:00 PM    esomeprazole (nexIUM) 40 MG capsule TAKE 1 CAPSULE BY MOUTH ONCE DAILY IN THE MORNING BEFORE BREAKFAST 90 capsule 0 5/5/2025 at  7:00 AM    glimepiride (AMARYL) 4 MG tablet Take 2 tablets by mouth 2 (Two) Times a Day.   5/6/2025 at  8:00 AM    HumaLOG KwikPen 100 UNIT/ML solution pen-injector Inject 2 Units under the skin into the appropriate area as directed Daily With Breakfast, Lunch & Dinner.   Taking    hydrochlorothiazide (HYDRODIURIL) 12.5 MG tablet Take 1 tablet by mouth Daily.   5/6/2025 at  8:00 AM    irbesartan (AVAPRO) 300 MG tablet Take 1 tablet by mouth once daily (Patient taking differently: Take 1 tablet by mouth Daily. PT TO HOLD 24 HOURS PRIOR TO SURGERY) 90 tablet 0  5/5/2025 at  8:00 AM    meloxicam (MOBIC) 15 MG tablet Take 1 tablet by mouth once daily (Patient taking differently: Take 1 tablet by mouth Daily. PT TO HOLD PER MD INSTRUCTIONS) 90 tablet 0 5/5/2025    metFORMIN (GLUCOPHAGE) 500 MG tablet Take 2 tablets by mouth 2 (Two) Times a Day.   5/6/2025 at  8:00 AM    mupirocin (BACTROBAN) 2 % nasal ointment Administer 1 Application into the nostril(s) as directed by provider 2 (Two) Times a Day. PREOP   5/7/2025 at  3:00 AM    Potassium 99 MG tablet Take 99 mg by mouth Daily.   5/6/2025 at  8:00 AM    Prasterone, DHEA, (DHEA PO) Take 1 tablet by mouth Daily.   5/6/2025 at  8:00 AM    Probiotic Product (PROBIOTIC PO) Take 1 tablet by mouth Daily.   5/6/2025 at  8:00 AM    sertraline (ZOLOFT) 25 MG tablet Take 1 tablet by mouth Daily. 90 tablet 2 5/6/2025 at  8:00 AM    Tayler SoloStar 300 UNIT/ML solution pen-injector injection Inject 15 Units under the skin into the appropriate area as directed Daily.   5/6/2025 at  8:00 AM    Trulicity 0.75 MG/0.5ML solution pen-injector Inject 0.75 mg as directed 1 (One) Time Per Week. SUNDAY. HELD FOR SURGERY. LAST DOSE 4/27/25 4/27/2025    TURMERIC PO Take 1 tablet by mouth Daily.   5/6/2025 at  8:00 AM       Current Meds  Scheduled Meds:aspirin, 81 mg, Oral, Daily  atorvastatin, 40 mg, Oral, Nightly  ceFAZolin, 2 g, Intravenous, Q8H  enoxaparin sodium, 40 mg, Subcutaneous, Q24H  gabapentin, 200 mg, Oral, Q12H  guaiFENesin, 1,200 mg, Oral, Q12H  magnesium sulfate, 1 g, Intravenous, Q8H  metoprolol tartrate, 12.5 mg, Oral, Q12H  mupirocin, 1 Application, Each Nare, BID  pantoprazole, 40 mg, Intravenous, Once   Followed by  pantoprazole, 40 mg, Oral, Q AM  senna-docusate sodium, 2 tablet, Oral, Nightly  sertraline, 25 mg, Oral, Daily      Continuous Infusions:clevidipine, 2-32 mg/hr  dexmedetomidine, 0.2-1.5 mcg/kg/hr, Last Rate: Stopped (05/07/25 7913)  DOPamine, 2-20 mcg/kg/min  EPINEPHrine, 0.02-0.1 mcg/kg/min  insulin, 0-100  Units/hr, Last Rate: 2 Units/hr (05/08/25 0600)  milrinone, 0.25-0.375 mcg/kg/min  niCARdipine, 5-15 mg/hr, Last Rate: Stopped (05/07/25 1300)  nitroglycerin, 5-200 mcg/min  norepinephrine, 0.02-0.2 mcg/kg/min  phenylephrine, 0.2-2 mcg/kg/min, Last Rate: Stopped (05/07/25 2326)  propofol, 5-50 mcg/kg/min, Last Rate: Stopped (05/07/25 1305)      PRN Meds:.  acetaminophen **OR** acetaminophen **OR** acetaminophen    albumin human    ALPRAZolam    bisacodyl    bisacodyl    clevidipine    cyclobenzaprine    dextrose    dextrose    DOPamine    EPINEPHrine    glucagon (human recombinant)    HYDROcodone-acetaminophen    magnesium hydroxide    midazolam    milrinone    Morphine **AND** naloxone    Morphine    niCARdipine    nitroglycerin    norepinephrine    ondansetron    oxyCODONE    phenylephrine    polyethylene glycol    Potassium Replacement - Follow Nurse / BPA Driven Protocol    propofol    Allergies as of 03/24/2025    (No Known Allergies)       Social History     Socioeconomic History    Marital status:      Spouse name: Nivia    Number of children: 2   Tobacco Use    Smoking status: Never     Passive exposure: Past    Smokeless tobacco: Never   Vaping Use    Vaping status: Never Used   Substance and Sexual Activity    Alcohol use: Yes     Comment: RARE / 12 beers a yr    Drug use: No    Sexual activity: Defer       Family History   Problem Relation Age of Onset    Intracerebral hemorrhage Mother     Heart attack Father     Diabetes Father     Diabetes Sister     Diabetes Paternal Grandmother     Malig Hyperthermia Neg Hx        REVIEW OF SYSTEMS:   12 point ROS was performed and is negative except as outlined in HPI          Objective:   Temp:  [97.3 °F (36.3 °C)-99.5 °F (37.5 °C)] 99.1 °F (37.3 °C)  Heart Rate:  [64-86] 86  Resp:  [12-20] 14  BP: ()/(56-73) 109/60  Arterial Line BP: (101-129)/(43-58) 106/46  FiO2 (%):  [39 %-96 %] 40 %  Body mass index is 28.37 kg/m².  Flowsheet Rows      Flowsheet  "Row First Filed Value   Admission Height 182.9 cm (72\") Documented at 05/07/2025 0533   Admission Weight 93.6 kg (206 lb 6.4 oz) Documented at 05/07/2025 0533          Vitals:    05/08/25 0800   BP: 109/60   Pulse: 86   Resp:    Temp: 99.1 °F (37.3 °C)   SpO2: 100%       GEN: no distress, alert and oriented  HEENT: NACT, EOMI, moist mucous membranes, RIJ line  Lungs: CTAB, no wheezes, rales or rhonchi, + chest tubes  CV: normal rate, regular rhythm, normal S1, S2, no murmurs, +2 radial pulses b/l, no carotid bruit  Abdomen: soft, nontender, nondistended, NABS  Extremities: no edema  Skin: no rash, warm, dry  Heme/Lymph: no bruising  Psych: organized thought, normal behavior and affect      Lab Review:   Results from last 7 days   Lab Units 05/08/25  0309 05/07/25  1139 05/05/25  0829   SODIUM mmol/L 141   < > 142   POTASSIUM mmol/L 4.1   < > 4.9   CHLORIDE mmol/L 108*   < > 102   CO2 mmol/L 21.7*   < > 26.2   BUN mg/dL 30*   < > 30*   CREATININE mg/dL 1.35*   < > 1.34*   CALCIUM mg/dL 8.6   < > 9.9   BILIRUBIN mg/dL  --   --  0.3   ALK PHOS U/L  --   --  56   ALT (SGPT) U/L  --   --  16   AST (SGOT) U/L  --   --  17   GLUCOSE mg/dL 154*   < > 169*    < > = values in this interval not displayed.         Results from last 7 days   Lab Units 05/08/25  0309 05/07/25  1533 05/07/25  1139   WBC 10*3/mm3 9.40 10.62 11.09*   HEMOGLOBIN g/dL 8.3* 10.1* 8.8*   HEMATOCRIT % 26.2* 31.4* 27.8*   PLATELETS 10*3/mm3 170 192 204     Results from last 7 days   Lab Units 05/08/25  0309 05/07/25  1139 05/05/25  0829   INR  1.30* 1.27* 1.04   APTT seconds  --  30.9 27.5     Results from last 7 days   Lab Units 05/08/25  0309   MAGNESIUM mg/dL 2.8*         I personally viewed and interpreted the patient's EKG/Telemetry data)      CAD (coronary artery disease), native coronary artery    Coronary artery disease of native heart with stable angina pectoris    Abnormal findings on diagnostic imaging of heart and coronary " circulation    Assessment and Plan:  #Multivessel CAD status post CABG  #VICTOR MANUEL  #Anemia  #Hypertension  #Hyperlipidemia  #Diabetes    74 y.o. male, seen by Dr Yoo, with hypertension, hyperlipidemia, diabetes, history of provoked PE, recently found multivessel CAD, superficial vein thrombosis of the right greater saphenous vein who presented for elective CABG.  He underwent this yesterday with Dr. Grissom (CABG x 3, DIPIKA to LAD, LIMA to OM, SVG to RCA.)      He is doing well, hemodynamically stable.  Continue routine postop management.      Jai Fisher MD, [facility], [facility]  05/08/25  09:24 EDT.

## 2025-05-08 NOTE — PLAN OF CARE
Goal Outcome Evaluation:  Plan of Care Reviewed With: patient        Progress: improving  Outcome Evaluation: Pt POD 1 CABGx3. Currently on 4L NC. Assist x1 when getting up to the chair. Coached on the incentive spirometer and reaching 750. Pain medication given PRN. Care ongoing.

## 2025-05-08 NOTE — PLAN OF CARE
Goal Outcome Evaluation:              Outcome Evaluation: S/p CABGx3, POD#1. Aaox3, drowsy. NSR on tele, all other VSS, on 4L O2. Pain treated per MAR. Chest tubes, IJ, f/c, and temp pacer remain in place. Ambulating x2 assist with encouragement. Tolerating clear liquid diet. Will continue with routine postop care and update as needed.

## 2025-05-08 NOTE — THERAPY EVALUATION
Patient Name: Marysol Bennett  : 1950    MRN: [id number]                              Today's Date: 2025       Admit Date: 2025    Visit Dx:     ICD-10-CM ICD-9-CM   1. S/P CABG (coronary artery bypass graft)  Z95.1 V45.81   2. Coronary artery disease of native artery of native heart with stable angina pectoris  I25.118 414.01     413.9   3. Coronary artery disease of native heart with stable angina pectoris, unspecified vessel or lesion type  I25.118 414.01     413.9   4. Abnormal findings on diagnostic imaging of heart and coronary circulation  R93.1 794.39     Patient Active Problem List   Diagnosis    Essential hypertension    Mixed hyperlipidemia    Diabetes mellitus    Arthritis    Diverticulosis    Gastroesophageal reflux disease without esophagitis    Medicare annual wellness visit, subsequent    Colon cancer screening    Chronic fatigue    Erectile dysfunction due to diseases classified elsewhere    Memory loss    Other acute pulmonary embolism with acute cor pulmonale    Elevated testosterone level in male    Anxiety    Chronic cough    Vertigo    Flank pain    Syncope with risk for coronary artery disease greater than 20% in next 10 years    Coronary artery disease of native heart with stable angina pectoris    Abnormal findings on diagnostic imaging of heart and coronary circulation    CAD (coronary artery disease)    Thrombophlebitis of superficial veins of right lower extremity    CAD (coronary artery disease), native coronary artery     Past Medical History:   Diagnosis Date    Arthritis     Coronary artery disease     Diverticulitis     DVT (deep venous thrombosis)     LEFT LEG, LOG ROLLED ONTO ANKLE AND CAUSED BLOOD CLOTS    GERD (gastroesophageal reflux disease)     Hyperlipidemia     Hypertension     Pulmonary embolism     RIGHT    SOB (shortness of breath)     Type 2 diabetes mellitus     Vertigo      Past Surgical History:   Procedure Laterality Date    APPENDECTOMY   1977    CARDIAC CATHETERIZATION N/A 3/24/2025    Procedure: Coronary angiography;  Surgeon: Jai Black MD;  Location: [facility] CATH INVASIVE LOCATION;  Service: Cardiovascular;  Laterality: N/A;    CARDIAC CATHETERIZATION N/A 3/24/2025    Procedure: Left heart cath;  Surgeon: Jai Black MD;  Location: [facility] CATH INVASIVE LOCATION;  Service: Cardiovascular;  Laterality: N/A;    CARDIAC CATHETERIZATION N/A 3/24/2025    Procedure: Resting Full Cycle Ratio-Procedure not performed;  Surgeon: Jai Black MD;  Location: [facility] CATH INVASIVE LOCATION;  Service: Cardiovascular;  Laterality: N/A;    COLONOSCOPY      COLONOSCOPY N/A 08/11/2023    Procedure: COLONOSCOPY INTO CECUM;  Surgeon: Jefferson Rouse Jr., MD;  Location: [facility] ENDOSCOPY;  Service: General;  Laterality: N/A;  PRE:  SCREENING /    POST: DIVERTICULOSIS, HEMORRHOIDS    CORONARY ARTERY BYPASS GRAFT N/A 5/7/2025    Procedure: NAMAN PER ANESTHESIA, STERNOTOMY, CORONARY ARTERY BYPASS GRAFTS X 3 USING ENDOSCOPICALLY HARVESTED LEFT GREATER SAPHENOUS VEIN, LEFT AND RIGHT IMAs, PRP;  Surgeon: Jr Kg Grissom MD;  Location: [facility] CVOR;  Service: Cardiothoracic;  Laterality: N/A;    HAND SURGERY Left     KNEE ARTHROSCOPY Right     KNEE CARTILAGE SURGERY Left     ROTATOR CUFF REPAIR Left 2005    TOTAL HIP ARTHROPLASTY Right 2005    TOTAL SHOULDER REVERSE ARTHROPLASTY Left 10/2020      General Information       Row Name 05/08/25 1035          Physical Therapy Time and Intention    Document Type evaluation  -EM     Mode of Treatment individual therapy;physical therapy  -EM       Row Name 05/08/25 1035          General Information    Patient Profile Reviewed yes  -EM     Prior Level of Function independent:  -EM     Existing Precautions/Restrictions fall;cardiac;sternal  -EM       Row Name 05/08/25 1035          Living Environment    Current Living Arrangements home  -EM     People in Home spouse  -EM       Row Name 05/08/25 1035          Home Main Entrance     "Number of Stairs, Main Entrance six  -EM       Row Name 05/08/25 1035          Stairs Within Home, Primary    Number of Stairs, Within Home, Primary none  -EM       Row Name 05/08/25 1035          Cognition    Orientation Status (Cognition) oriented x 3  -EM       Row Name 05/08/25 1035          Safety Issues/Impairments Affecting Functional Mobility    Impairments Affecting Function (Mobility) endurance/activity tolerance;strength;pain;range of motion (ROM)  -EM               User Key  (r) = Recorded By, (t) = Taken By, (c) = Cosigned By      Initials Name Provider Type    Kasia Brumfield PT Physical Therapist                   Mobility       Row Name 05/08/25 1035          Bed Mobility    Comment, (Bed Mobility) not tested, up in chair  -EM       Row Name 05/08/25 1035          Sit-Stand Transfer    Sit-Stand [place] (Transfers) minimum assist (75% patient effort);verbal cues;2 person assist  -EM       Row Name 05/08/25 1035          Gait/Stairs (Locomotion)    [place] Level (Gait) minimum assist (75% patient effort);2 person assist;1 person to manage equipment  -EM     Distance in Feet (Gait) 10  -EM     Deviations/Abnormal Patterns (Gait) gait speed decreased;stride length decreased  -EM     Bilateral Gait Deviations forward flexed posture;decreased arm swing  -EM     Comment, (Gait/Stairs) very slow guarded pace with ambulation, constant verbal cues to keep eyes open, emilie HHA  -EM               User Key  (r) = Recorded By, (t) = Taken By, (c) = Cosigned By      Initials Name Provider Type    Kasia Brumfield PT Physical Therapist                   Obj/Interventions       Row Name 05/08/25 1036          Range of Motion Comprehensive    General Range of Motion other (see comments)  -EM     Comment, General Range of Motion pt reports he cannot actively move LUE at all, would not even perform elbow flx/ext due to pain in L shoulder from \"messed up surgery\" on L shoulder, limited RUE sh " flex due to pain it causes in LUE  -EM       Row Name 05/08/25 1036          Strength Comprehensive (MMT)    General Manual Muscle Testing (MMT) Assessment other (see comments)  -EM     Comment, General Manual Muscle Testing (MMT) Assessment generalized post op weakness, chronic pain issues in LUE  -EM       Row Name 05/08/25 1036          Motor Skills    Therapeutic Exercise other (see comments)  5 reps cardiac ex in LEs, 2-3 reps with RUE, limited due to pain, did not perform any ROM with LUE, level 2  -EM       Row Name 05/08/25 1036          Balance    Balance Assessment sitting static balance;sitting dynamic balance;standing static balance;standing dynamic balance  -EM     Static Sitting Balance standby assist  -EM     Dynamic Sitting Balance contact guard  -EM     Position, Sitting Balance sitting in chair  sitting up on edge of chair  -EM     Static Standing Balance minimal assist  -EM     Dynamic Standing Balance minimal assist;2-person assist  -EM       Row Name 05/08/25 1036          Sensory Assessment (Somatosensory)    Sensory Assessment (Somatosensory) sensation intact  -EM               User Key  (r) = Recorded By, (t) = Taken By, (c) = Cosigned By      Initials Name Provider Type    Kasia Brumfield, PT Physical Therapist                   Goals/Plan       Row Name 05/08/25 1044          Problem Specific Goal 1 (PT)    Problem Specific Goal 1 (PT) cardiac level 3  -EM     Time Frame (Problem Specific Goal 1, PT) short-term goal (STG);2 weeks;long-term goal (LTG)  -EM       Row Name 05/08/25 1044          Therapy Assessment/Plan (PT)    Planned Therapy Interventions (PT) bed mobility training;gait training;home exercise program;patient/family education;transfer training;strengthening  -EM               User Key  (r) = Recorded By, (t) = Taken By, (c) = Cosigned By      Initials Name Provider Type    Kasia Brumfield PT Physical Therapist                   Clinical Impression       [place]  "Name 05/08/25 1038          Pain    Pretreatment Pain Rating 0/10 - no pain  -EM     Posttreatment Pain Rating 1/10  -EM     Pre/Posttreatment Pain Comment pt states he has no pain at all when entered room, required a lot of encouragement to participate, moaning and yelling out throughout therapy especially when ambulating but rates pain at 1/10 when asked to rate pain. states he hurts \"everywhere\" and cannot get comfortable once back in chair. RN aware and gave more meds and applying pain patches  -EM       Row Name 05/08/25 1038          Plan of Care Review    Plan of Care Reviewed With patient  -EM     Outcome Evaluation Pt is 73 yo male admitted to [facility] for CABGx3 on 5/7/25. Pt lives with spouse, independent with mobility prior to admission. Pt reports chronic issues with L shoulder causing a lot of pain and reports he is unable to use his LUE at all. Pt rates pain at 1/10 but moaning and yelling out throughout session, required a lot of encouragement to attempt mobility. Patient performed sit to stand with minAx2, ambulated very short distance with min/HHAx2. Patient has impairments consisting of generalized post op weakness and pain, decreased activity tolerance and would benefit from skilled PT. d/c plan is home with family assist pending progress.  -EM       Row Name 05/08/25 1038          Therapy Assessment/Plan (PT)    Patient/Family Therapy Goals Statement (PT) go home  -EM     Rehab Potential (PT) fair  -EM     Criteria for Skilled Interventions Met (PT) yes;skilled treatment is necessary  -EM     Therapy Frequency (PT) daily  -EM       Row Name 05/08/25 1038          Vital Signs    Pre Systolic BP Rehab 129  -EM     Pre Treatment Diastolic BP 69  -EM     Pretreatment Heart Rate (beats/min) 75  -EM     Posttreatment Heart Rate (beats/min) 82  -EM     Pre SpO2 (%) 100  -EM     O2 Delivery Pre Treatment supplemental O2  -EM     Post SpO2 (%) 99  -EM     O2 Delivery Post Treatment supplemental O2  -EM       " Row Name 05/08/25 1038          Positioning and Restraints    Pre-Treatment Position sitting in chair/recliner  -EM     Post Treatment Position chair  -EM     In Chair reclined;with family/caregiver;call light within reach  -EM               User Key  (r) = Recorded By, (t) = Taken By, (c) = Cosigned By      Initials Name Provider Type    Kasia Brumfield PT Physical Therapist                   Outcome Measures       Row Name 05/08/25 1044          How much help from another person do you currently need...    Turning from your back to your side while in flat bed without using bedrails? 2  -EM     Moving from lying on back to sitting on the side of a flat bed without bedrails? 2  -EM     Moving to and from a bed to a chair (including a wheelchair)? 2  -EM     Standing up from a chair using your arms (e.g., wheelchair, bedside chair)? 2  -EM     Climbing 3-5 steps with a railing? 1  -EM     To walk in hospital room? 2  -EM     AM-PAC 6 Clicks Score (PT) 11  -EM     Highest Level of Mobility Goal 4 --> Transfer to chair/commode  -EM       Row Name 05/08/25 1044          Functional Assessment    Outcome Measure Options AM-PAC 6 Clicks Basic Mobility (PT)  -EM               User Key  (r) = Recorded By, (t) = Taken By, (c) = Cosigned By      Initials Name Provider Type    Kasia Brumfield PT Physical Therapist                                 Physical Therapy Education       Title: PT OT SLP Therapies (In Progress)       Topic: Physical Therapy (In Progress)       Point: Mobility training (In Progress)       Learning Progress Summary            Patient Acceptance, E, NR by EM at 5/8/2025 1045                      Point: Home exercise program (In Progress)       Learning Progress Summary            Patient Acceptance, E, NR by EM at 5/8/2025 1045                      Point: Body mechanics (Not Started)       Learner Progress:  Not documented in this visit.              Point: Precautions (Not Started)        Learner Progress:  Not documented in this visit.                              User Key       Initials Effective Dates Name Provider Type Discipline    EM 06/16/21 -  Kasia Alvarado PT Physical Therapist PT                  PT Recommendation and Plan  Planned Therapy Interventions (PT): bed mobility training, gait training, home exercise program, patient/family education, transfer training, strengthening  Outcome Evaluation: Pt is 75 yo male admitted to [facility] for CABGx3 on 5/7/25. Pt lives with spouse, independent with mobility prior to admission. Pt reports chronic issues with L shoulder causing a lot of pain and reports he is unable to use his LUE at all. Pt rates pain at 1/10 but moaning and yelling out throughout session, required a lot of encouragement to attempt mobility. Patient performed sit to stand with minAx2, ambulated very short distance with min/HHAx2. Patient has impairments consisting of generalized post op weakness and pain, decreased activity tolerance and would benefit from skilled PT. d/c plan is home with family assist pending progress.     Time Calculation:         PT Charges       Row Name 05/08/25 1046             Time Calculation    Start Time 1000  -EM      Stop Time 1031  -EM      Time Calculation (min) 31 min  -EM      PT Received On 05/08/25  -EM      PT - Next Appointment 05/09/25  -EM      PT Goal Re-Cert Due Date 05/22/25  -EM         Time Calculation- PT    Total Timed Code Minutes- PT 25 minute(s)  -EM         Timed Charges    07418 - PT Therapeutic Exercise Minutes 8  -EM      68918 - PT Therapeutic Activity Minutes 17  -EM         Total Minutes    Timed Charges Total Minutes 25  -EM       Total Minutes 25  -EM                User Key  (r) = Recorded By, (t) = Taken By, (c) = Cosigned By      Initials Name Provider Type    EM Kasia Alvarado PT Physical Therapist                  Therapy Charges for Today       Code Description Service Date Service Provider Modifiers Qty     25280520907 HC PT THER PROC EA 15 MIN 5/8/2025 Kasia Alvarado, PT GP 1    90302187234 HC PT THERAPEUTIC ACT EA 15 MIN 5/8/2025 Kasia Alvarado, PT GP 1    12252192865 HC PT THER SUPP EA 15 MIN 5/8/2025 Kasia Alvarado, PT GP 1    86488652791 HC PT EVAL MOD COMPLEXITY 3 5/8/2025 Kasia Alvarado, PT GP 1            PT G-Codes  Outcome Measure Options: AM-PAC 6 Clicks Basic Mobility (PT)  AM-PAC 6 Clicks Score (PT): 11  PT Discharge Summary  Anticipated Discharge Disposition (PT): home with assist, home with home health    Kasia Alvarado, PT  5/8/2025

## 2025-05-08 NOTE — PROGRESS NOTES
" LOS: 1 day   Patient Care Team:  Lavell Dunaway MD as PCP - General (Family Medicine)  Papo Gale MD as Consulting Physician (Hematology and Oncology)  Rachael Reynaga MD as Referring Physician (Hospitalist)    Chief Complaint:   Post-op follow-up, s/p CABG with BIMA    Subjective  Sitting up in chair. Having quite a bit of pain this AM.     Vital Signs  Temp:  [97.3 °F (36.3 °C)-99.5 °F (37.5 °C)] 99.1 °F (37.3 °C)  Heart Rate:  [64-86] 86  Resp:  [12-20] 14  BP: ()/(56-73) 109/60  Arterial Line BP: (101-129)/(43-58) 106/46  FiO2 (%):  [39 %-96 %] 40 %      05/07/25  0533 05/08/25  0600   Weight: 93.6 kg (206 lb 6.4 oz) 94.9 kg (209 lb 3.5 oz)     Body mass index is 28.37 kg/m².    Intake/Output Summary (Last 24 hours) at 5/8/2025 1041  Last data filed at 5/8/2025 0700  Gross per 24 hour   Intake 4844 ml   Output 4580 ml   Net 264 ml     No intake/output data recorded.    Chest tube drainage last 8 hours: 170/110    Objective:  Vital signs: (most recent): Blood pressure 109/60, pulse 86, temperature 99.1 °F (37.3 °C), resp. rate 14, height 182.9 cm (72\"), weight 94.9 kg (209 lb 3.5 oz), SpO2 100%.                Physical Exam:   General Appearance: awake and alert, no acute distress   Lungs:  diminished, decrease effort   Heart: regular rhythm & normal rate and normal S1, S2   Abdomen: soft or nontender, hypoactive bowel sounds    Skin: sternal incision clean, dry, intact; EVH site c/d/i   Neuro: alert and oriented, no focal deficits.     Results Review:      WBC WBC   Date Value Ref Range Status   05/08/2025 9.40 3.40 - 10.80 10*3/mm3 Final   05/07/2025 10.62 3.40 - 10.80 10*3/mm3 Final   05/07/2025 11.09 (H) 3.40 - 10.80 10*3/mm3 Final      HGB Hemoglobin   Date Value Ref Range Status   05/08/2025 8.3 (L) 13.0 - 17.7 g/dL Final   05/07/2025 10.1 (L) 13.0 - 17.7 g/dL Final   05/07/2025 8.8 (L) 13.0 - 17.7 g/dL Final   05/07/2025 7.8 (C) 12.0 - 17.0 g/dL Final   05/07/2025 7.5 (C) 12.0 - 17.0 " g/dL Final   05/07/2025 8.2 (L) 12.0 - 17.0 g/dL Final   05/07/2025 7.1 (C) 12.0 - 17.0 g/dL Final   05/07/2025 6.8 (C) 12.0 - 17.0 g/dL Final   05/07/2025 8.2 (L) 12.0 - 17.0 g/dL Final      HCT Hematocrit   Date Value Ref Range Status   05/08/2025 26.2 (L) 37.5 - 51.0 % Final   05/07/2025 31.4 (L) 37.5 - 51.0 % Final   05/07/2025 27.8 (L) 37.5 - 51.0 % Final   05/07/2025 23 (L) 38 - 51 % Final   05/07/2025 22 (L) 38 - 51 % Final   05/07/2025 24 (L) 38 - 51 % Final   05/07/2025 21 (L) 38 - 51 % Final   05/07/2025 20 (L) 38 - 51 % Final   05/07/2025 24 (L) 38 - 51 % Final      Platelets Platelets   Date Value Ref Range Status   05/08/2025 170 140 - 450 10*3/mm3 Final   05/07/2025 192 140 - 450 10*3/mm3 Final   05/07/2025 204 140 - 450 10*3/mm3 Final   05/07/2025 234 140 - 450 10*3/mm3 Final        PT/INR:    Protime   Date Value Ref Range Status   05/08/2025 16.2 (H) 11.7 - 14.2 Seconds Final   05/07/2025 15.9 (H) 11.7 - 14.2 Seconds Final   /  INR   Date Value Ref Range Status   05/08/2025 1.30 (H) 0.90 - 1.10 Final   05/07/2025 1.27 (H) 0.90 - 1.10 Final       Sodium Sodium   Date Value Ref Range Status   05/08/2025 141 136 - 145 mmol/L Final   05/07/2025 140 136 - 145 mmol/L Final   05/07/2025 143 136 - 145 mmol/L Final      Potassium Potassium   Date Value Ref Range Status   05/08/2025 4.1 3.5 - 5.2 mmol/L Final   05/07/2025 4.3 3.5 - 5.2 mmol/L Final   05/07/2025 4.6 3.5 - 5.2 mmol/L Final      Chloride Chloride   Date Value Ref Range Status   05/08/2025 108 (H) 98 - 107 mmol/L Final   05/07/2025 109 (H) 98 - 107 mmol/L Final   05/07/2025 110 (H) 98 - 107 mmol/L Final      Bicarbonate CO2   Date Value Ref Range Status   05/08/2025 21.7 (L) 22.0 - 29.0 mmol/L Final   05/07/2025 22.0 22.0 - 29.0 mmol/L Final   05/07/2025 23.8 22.0 - 29.0 mmol/L Final      BUN BUN   Date Value Ref Range Status   05/08/2025 30 (H) 8 - 23 mg/dL Final   05/07/2025 31 (H) 8 - 23 mg/dL Final   05/07/2025 32 (H) 8 - 23 mg/dL Final       Creatinine Creatinine   Date Value Ref Range Status   05/08/2025 1.35 (H) 0.76 - 1.27 mg/dL Final   05/07/2025 1.38 (H) 0.76 - 1.27 mg/dL Final   05/07/2025 1.42 (H) 0.76 - 1.27 mg/dL Final      Calcium Calcium   Date Value Ref Range Status   05/08/2025 8.6 8.6 - 10.5 mg/dL Final   05/07/2025 9.0 8.6 - 10.5 mg/dL Final   05/07/2025 8.9 8.6 - 10.5 mg/dL Final      Magnesium Magnesium   Date Value Ref Range Status   05/08/2025 2.8 (H) 1.6 - 2.4 mg/dL Final   05/07/2025 2.6 (H) 1.6 - 2.4 mg/dL Final   05/07/2025 3.2 (H) 1.6 - 2.4 mg/dL Final        acetaminophen, 1,000 mg, Oral, Q6H  aspirin, 81 mg, Oral, Daily  atorvastatin, 40 mg, Oral, Nightly  ceFAZolin, 2 g, Intravenous, Q8H  enoxaparin sodium, 40 mg, Subcutaneous, Q24H  gabapentin, 200 mg, Oral, Q12H  guaiFENesin, 1,200 mg, Oral, Q12H  insulin glargine, 15 Units, Subcutaneous, Daily  insulin lispro, 2-7 Units, Subcutaneous, 4x Daily AC & at Bedtime  Lidocaine, 2 patch, Transdermal, Q24H  magnesium sulfate, 1 g, Intravenous, Q8H  metoprolol tartrate, 12.5 mg, Oral, Q12H  mupirocin, 1 Application, Each Nare, BID  pantoprazole, 40 mg, Intravenous, Once   Followed by  pantoprazole, 40 mg, Oral, Q AM  senna-docusate sodium, 2 tablet, Oral, Nightly  sertraline, 25 mg, Oral, Daily      clevidipine, 2-32 mg/hr  DOPamine, 2-20 mcg/kg/min  EPINEPHrine, 0.02-0.1 mcg/kg/min  milrinone, 0.25-0.375 mcg/kg/min  niCARdipine, 5-15 mg/hr, Last Rate: Stopped (05/07/25 1300)  nitroglycerin, 5-200 mcg/min  norepinephrine, 0.02-0.2 mcg/kg/min  phenylephrine, 0.2-2 mcg/kg/min, Last Rate: Stopped (05/07/25 0906)  propofol, 5-50 mcg/kg/min, Last Rate: Stopped (05/07/25 1305)          CAD (coronary artery disease), native coronary artery    Coronary artery disease of native heart with stable angina pectoris    Abnormal findings on diagnostic imaging of heart and coronary circulation      Assessment & Plan    Multivessel CAD--- s/p CABGx3(LIMA to OM1, DIPIKA to LAD, SVG to RCA) -   [place]  Type 2 diabetes  Hypertension-- BB as tolerated  Hyperlipidemia-- statin  History of recent DVT  History of pulmonary embolism  Anxiety/depression-- home sertraline  VICTOR MANUEL--- Cr stable, monitor      POD#1.  Doing ok this AM.  A-line and swan out.   Transition to SSI.  Add gabapentin for pain control.  Mobilize and encourage pulm toilet. Add mucinex and flutter.  Continue routine care.   Transfer to stepdown unit.     Damon Bedoya PA-C  05/08/25  10:41 EDT

## 2025-05-08 NOTE — ANESTHESIA POSTPROCEDURE EVALUATION
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 pectoris, unspecified vessel or lesion type      Abnormal findings on diagnostic imaging of heart and coronary circulation      (Coronary artery disease of native heart with stable angina pectoris, unspecified vessel or lesion type [I25.118])      (Abnormal findings on diagnostic imaging of heart and coronary circulation [R93.1])    Surgeons: Jr Kg Grissom MD Provider: Leonides Manzo MD    Anesthesia Type: general, [place], CVL ASA Status: 4            Anesthesia Type: general, [place], CVL    Vitals  Vitals Value Taken Time   /63 05/08/25 07:30   Temp 37.3 °C (99.14 °F) 05/08/25 07:41   Pulse 86 05/08/25 07:41   Resp 14 05/08/25 07:00   SpO2 99 % 05/08/25 07:41   Vitals shown include unfiled device data.        Post Anesthesia Care and Evaluation    Patient location during evaluation: bedside  Patient participation: complete - patient participated  Level of consciousness: awake  Pain management: adequate    Airway patency: patent  Anesthetic complications: No anesthetic complications  PONV Status: none  Cardiovascular status: acceptable  Respiratory status: acceptable  Hydration status: acceptable  Post Neuraxial Block status: Motor and sensory function returned to baseline

## 2025-05-09 ENCOUNTER — APPOINTMENT (OUTPATIENT)
Dept: GENERAL RADIOLOGY | Facility: HOSPITAL | Age: 75
DRG: 236 | End: 2025-05-09
Payer: MEDICARE

## 2025-05-09 LAB
ANION GAP SERPL CALCULATED.3IONS-SCNC: 10 MMOL/L (ref 5–15)
BUN SERPL-MCNC: 37 MG/DL (ref 8–23)
BUN/CREAT SERPL: 23.1 (ref 7–25)
CALCIUM SPEC-SCNC: 8.6 MG/DL (ref 8.6–10.5)
CHLORIDE SERPL-SCNC: 101 MMOL/L (ref 98–107)
CO2 SERPL-SCNC: 23 MMOL/L (ref 22–29)
CREAT SERPL-MCNC: 1.6 MG/DL (ref 0.76–1.27)
DEPRECATED RDW RBC AUTO: 47.9 FL (ref 37–54)
EGFRCR SERPLBLD CKD-EPI 2021: 44.7 ML/MIN/1.73
ERYTHROCYTE [DISTWIDTH] IN BLOOD BY AUTOMATED COUNT: 15.1 % (ref 12.3–15.4)
GLUCOSE BLDC GLUCOMTR-MCNC: 258 MG/DL (ref 70–130)
GLUCOSE BLDC GLUCOMTR-MCNC: 299 MG/DL (ref 70–130)
GLUCOSE BLDC GLUCOMTR-MCNC: 303 MG/DL (ref 70–130)
GLUCOSE BLDC GLUCOMTR-MCNC: 306 MG/DL (ref 70–130)
GLUCOSE SERPL-MCNC: 276 MG/DL (ref 65–99)
HCT VFR BLD AUTO: 26.3 % (ref 37.5–51)
HGB BLD-MCNC: 8.3 G/DL (ref 13–17.7)
MCH RBC QN AUTO: 27.6 PG (ref 26.6–33)
MCHC RBC AUTO-ENTMCNC: 31.6 G/DL (ref 31.5–35.7)
MCV RBC AUTO: 87.4 FL (ref 79–97)
PLATELET # BLD AUTO: 155 10*3/MM3 (ref 140–450)
PMV BLD AUTO: 9.5 FL (ref 6–12)
POTASSIUM SERPL-SCNC: 4.7 MMOL/L (ref 3.5–5.2)
QT INTERVAL: 371 MS
QTC INTERVAL: 396 MS
RBC # BLD AUTO: 3.01 10*6/MM3 (ref 4.14–5.8)
SODIUM SERPL-SCNC: 134 MMOL/L (ref 136–145)
WBC NRBC COR # BLD AUTO: 9.26 10*3/MM3 (ref 3.4–10.8)

## 2025-05-09 PROCEDURE — 80048 BASIC METABOLIC PNL TOTAL CA: CPT | Performed by: PHYSICIAN ASSISTANT

## 2025-05-09 PROCEDURE — 63710000001 INSULIN LISPRO (HUMAN) PER 5 UNITS

## 2025-05-09 PROCEDURE — 99232 SBSQ HOSP IP/OBS MODERATE 35: CPT | Performed by: STUDENT IN AN ORGANIZED HEALTH CARE EDUCATION/TRAINING PROGRAM

## 2025-05-09 PROCEDURE — 25010000002 ENOXAPARIN PER 10 MG: Performed by: PHYSICIAN ASSISTANT

## 2025-05-09 PROCEDURE — 63710000001 INSULIN LISPRO (HUMAN) PER 5 UNITS: Performed by: PHYSICIAN ASSISTANT

## 2025-05-09 PROCEDURE — 93010 ELECTROCARDIOGRAM REPORT: CPT | Performed by: INTERNAL MEDICINE

## 2025-05-09 PROCEDURE — 71045 X-RAY EXAM CHEST 1 VIEW: CPT

## 2025-05-09 PROCEDURE — 85027 COMPLETE CBC AUTOMATED: CPT | Performed by: PHYSICIAN ASSISTANT

## 2025-05-09 PROCEDURE — 25010000002 CEFAZOLIN PER 500 MG: Performed by: PHYSICIAN ASSISTANT

## 2025-05-09 PROCEDURE — 82948 REAGENT STRIP/BLOOD GLUCOSE: CPT

## 2025-05-09 PROCEDURE — 97530 THERAPEUTIC ACTIVITIES: CPT

## 2025-05-09 PROCEDURE — 93005 ELECTROCARDIOGRAM TRACING: CPT | Performed by: NURSE PRACTITIONER

## 2025-05-09 PROCEDURE — 25010000002 FUROSEMIDE PER 20 MG

## 2025-05-09 PROCEDURE — 63710000001 INSULIN GLARGINE PER 5 UNITS: Performed by: PHYSICIAN ASSISTANT

## 2025-05-09 RX ORDER — INSULIN LISPRO 100 [IU]/ML
2-9 INJECTION, SOLUTION INTRAVENOUS; SUBCUTANEOUS
Status: DISCONTINUED | OUTPATIENT
Start: 2025-05-09 | End: 2025-05-12 | Stop reason: HOSPADM

## 2025-05-09 RX ORDER — FUROSEMIDE 10 MG/ML
40 INJECTION INTRAMUSCULAR; INTRAVENOUS ONCE
Status: COMPLETED | OUTPATIENT
Start: 2025-05-09 | End: 2025-05-09

## 2025-05-09 RX ORDER — DEXTROSE MONOHYDRATE 25 G/50ML
25 INJECTION, SOLUTION INTRAVENOUS
Status: DISCONTINUED | OUTPATIENT
Start: 2025-05-09 | End: 2025-05-12 | Stop reason: HOSPADM

## 2025-05-09 RX ORDER — POLYETHYLENE GLYCOL 3350 17 G/17G
17 POWDER, FOR SOLUTION ORAL DAILY
Status: DISCONTINUED | OUTPATIENT
Start: 2025-05-09 | End: 2025-05-12 | Stop reason: HOSPADM

## 2025-05-09 RX ORDER — POTASSIUM CHLORIDE 1500 MG/1
20 TABLET, EXTENDED RELEASE ORAL ONCE
Status: COMPLETED | OUTPATIENT
Start: 2025-05-09 | End: 2025-05-09

## 2025-05-09 RX ORDER — NICOTINE POLACRILEX 4 MG
15 LOZENGE BUCCAL
Status: DISCONTINUED | OUTPATIENT
Start: 2025-05-09 | End: 2025-05-12 | Stop reason: HOSPADM

## 2025-05-09 RX ORDER — FUROSEMIDE 40 MG/1
40 TABLET ORAL DAILY
Status: DISCONTINUED | OUTPATIENT
Start: 2025-05-10 | End: 2025-05-12 | Stop reason: HOSPADM

## 2025-05-09 RX ORDER — IBUPROFEN 600 MG/1
1 TABLET ORAL
Status: DISCONTINUED | OUTPATIENT
Start: 2025-05-09 | End: 2025-05-12 | Stop reason: HOSPADM

## 2025-05-09 RX ORDER — GLIPIZIDE 5 MG/1
10 TABLET ORAL
Status: DISCONTINUED | OUTPATIENT
Start: 2025-05-09 | End: 2025-05-12 | Stop reason: HOSPADM

## 2025-05-09 RX ORDER — INSULIN LISPRO 100 [IU]/ML
3 INJECTION, SOLUTION INTRAVENOUS; SUBCUTANEOUS
Status: DISCONTINUED | OUTPATIENT
Start: 2025-05-09 | End: 2025-05-10

## 2025-05-09 RX ORDER — POTASSIUM CHLORIDE 1500 MG/1
20 TABLET, EXTENDED RELEASE ORAL DAILY
Status: DISCONTINUED | OUTPATIENT
Start: 2025-05-10 | End: 2025-05-12 | Stop reason: HOSPADM

## 2025-05-09 RX ORDER — PANTOPRAZOLE SODIUM 40 MG/1
40 TABLET, DELAYED RELEASE ORAL
Status: DISCONTINUED | OUTPATIENT
Start: 2025-05-10 | End: 2025-05-12 | Stop reason: HOSPADM

## 2025-05-09 RX ADMIN — POLYETHYLENE GLYCOL 3350 17 G: 17 POWDER, FOR SOLUTION ORAL at 09:26

## 2025-05-09 RX ADMIN — INSULIN LISPRO 3 UNITS: 100 INJECTION, SOLUTION INTRAVENOUS; SUBCUTANEOUS at 16:18

## 2025-05-09 RX ADMIN — OXYCODONE HYDROCHLORIDE 10 MG: 10 TABLET ORAL at 01:52

## 2025-05-09 RX ADMIN — CEFAZOLIN 2 G: 2 INJECTION, POWDER, FOR SOLUTION INTRAMUSCULAR; INTRAVENOUS at 02:02

## 2025-05-09 RX ADMIN — MUPIROCIN 1 APPLICATION: 20 OINTMENT TOPICAL at 08:12

## 2025-05-09 RX ADMIN — INSULIN LISPRO 5 UNITS: 100 INJECTION, SOLUTION INTRAVENOUS; SUBCUTANEOUS at 11:26

## 2025-05-09 RX ADMIN — HYDROCODONE BITARTRATE AND ACETAMINOPHEN 2 TABLET: 5; 325 TABLET ORAL at 13:05

## 2025-05-09 RX ADMIN — PANTOPRAZOLE SODIUM 40 MG: 40 TABLET, DELAYED RELEASE ORAL at 06:43

## 2025-05-09 RX ADMIN — GLIPIZIDE 10 MG: 5 TABLET ORAL at 13:05

## 2025-05-09 RX ADMIN — POTASSIUM CHLORIDE 20 MEQ: 1500 TABLET, EXTENDED RELEASE ORAL at 09:26

## 2025-05-09 RX ADMIN — SENNOSIDES AND DOCUSATE SODIUM 2 TABLET: 50; 8.6 TABLET ORAL at 20:01

## 2025-05-09 RX ADMIN — INSULIN LISPRO 6 UNITS: 100 INJECTION, SOLUTION INTRAVENOUS; SUBCUTANEOUS at 16:18

## 2025-05-09 RX ADMIN — ENOXAPARIN SODIUM 40 MG: 100 INJECTION SUBCUTANEOUS at 16:17

## 2025-05-09 RX ADMIN — GABAPENTIN 200 MG: 100 CAPSULE ORAL at 20:01

## 2025-05-09 RX ADMIN — METOPROLOL TARTRATE 12.5 MG: 25 TABLET, FILM COATED ORAL at 20:01

## 2025-05-09 RX ADMIN — SERTRALINE HYDROCHLORIDE 25 MG: 25 TABLET ORAL at 08:12

## 2025-05-09 RX ADMIN — INSULIN GLARGINE 15 UNITS: 100 INJECTION, SOLUTION SUBCUTANEOUS at 08:12

## 2025-05-09 RX ADMIN — GUAIFENESIN 1200 MG: 600 TABLET, MULTILAYER, EXTENDED RELEASE ORAL at 08:12

## 2025-05-09 RX ADMIN — HYDROCODONE BITARTRATE AND ACETAMINOPHEN 2 TABLET: 5; 325 TABLET ORAL at 19:59

## 2025-05-09 RX ADMIN — ASPIRIN 81 MG: 81 TABLET, COATED ORAL at 08:12

## 2025-05-09 RX ADMIN — FUROSEMIDE 40 MG: 10 INJECTION, SOLUTION INTRAMUSCULAR; INTRAVENOUS at 09:26

## 2025-05-09 RX ADMIN — INSULIN LISPRO 5 UNITS: 100 INJECTION, SOLUTION INTRAVENOUS; SUBCUTANEOUS at 06:56

## 2025-05-09 RX ADMIN — INSULIN LISPRO 6 UNITS: 100 INJECTION, SOLUTION INTRAVENOUS; SUBCUTANEOUS at 20:24

## 2025-05-09 RX ADMIN — ATORVASTATIN CALCIUM 40 MG: 20 TABLET, FILM COATED ORAL at 20:01

## 2025-05-09 RX ADMIN — MUPIROCIN 1 APPLICATION: 20 OINTMENT TOPICAL at 20:01

## 2025-05-09 RX ADMIN — GABAPENTIN 200 MG: 100 CAPSULE ORAL at 08:12

## 2025-05-09 RX ADMIN — GUAIFENESIN 1200 MG: 600 TABLET, MULTILAYER, EXTENDED RELEASE ORAL at 20:01

## 2025-05-09 NOTE — CASE MANAGEMENT/SOCIAL WORK
Discharge Planning Assessment  [facility]     Patient Name: Marysol Bennett  MRN: [id number]  Today's Date: 5/9/2025    Admit Date: 5/7/2025    Plan: Home w/ [facility]   Discharge Needs Assessment       Row Name 05/09/25 1502       Living Environment    People in Home spouse    Name(s) of People in Home Nivia Elder/spouse    Current Living Arrangements home    Potentially Unsafe Housing Conditions none    In the past 12 months has the electric, gas, oil, or water company threatened to shut off services in your home? No    Primary Care Provided by self    Provides Primary Care For no one    Family Caregiver if Needed spouse    Family Caregiver Names Spouse/Nivia    Quality of Family Relationships helpful;involved;supportive    Able to Return to Prior Arrangements yes       Resource/Environmental Concerns    Resource/Environmental Concerns home accessibility    Home Accessibility Concerns stairs to enter home  7    Transportation Concerns none       Transportation Needs    In the past 12 months, has lack of transportation kept you from medical appointments or from getting medications? no    In the past 12 months, has lack of transportation kept you from meetings, work, or from getting things needed for daily living? No       Food Insecurity    Within the past 12 months, you worried that your food would run out before you got the money to buy more. Never true    Within the past 12 months, the food you bought just didn't last and you didn't have money to get more. Never true       Transition Planning    Patient/Family Anticipates Transition to home with family    Patient/Family Anticipated Services at Transition home health care    Transportation Anticipated family or friend will provide       Discharge Needs Assessment    Readmission Within the Last 30 Days planned readmission    Equipment Currently Used at Home glucometer;scales;pulse ox;bath bench  CASSIE 3 glucometer; teakwood bathbench    Concerns to be  Addressed discharge planning    Do you want help finding or keeping work or a job? I do not need or want help    Do you want help with school or training? For example, starting or completing job training or getting a high school diploma, GED or equivalent No    Anticipated Changes Related to Illness none    Equipment Needed After Discharge none    Discharge Facility/Level of Care Needs home with home health    Provided Post Acute Provider Quality & Resource List? Yes    Post Acute Provider Quality and Resource List Home Health    Delivered To Patient    Method of Delivery In person    Offered/Gave Vendor List yes    Patient's Choice of Community Agency(s) [facility]                   Discharge Plan       Row Name 05/09/25 1522       Plan    Plan Home w/ [facility]    Plan Comments CCP spoke with patient at bedside status post open heart surgery (POD 2). Introduced self and role. Discharge planning discussed. Information on face sheet verified. Patient stated he is IADL's and drives. Patient lives with spouse/Nivia Elder in a two-story home with seven entrance stair steps. Patient is active and lives in [place] a few months out of the year. 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  Patients PCP is Lavell Dunaway and home pharmacy is [facility]. Patient has the following DME- Rupert 3 glucometer, scale, pulse oximeter and a teakwood bath bench. Patient has used past home health in 2020 after rotator cuff surgery but cannot recall the agency name. Patient denies past sub-acute rehab. Patient chose [facility] to follow him when he discharges.  Referral sent to [facility].  Pt reports he will have 24/7 assistance from his spouse at discharge.  CCP will continue to follow….…Bre TOUSSAINT /ALLAN.                  Continued Care and Services - Admitted Since 5/7/2025       Home Medical Care       Service Provider Request Status Services Address  Phone Fax Patient Preferred    [facility] Accepted -- [address], [address], [place] [id number] [phone] --                  Expected Discharge Date and Time       Expected Discharge Date Expected Discharge Time    May 13, 2025            Demographic Summary       Row Name 05/09/25 1501       General Information    Admission Type inpatient    Arrived From home    Required Notices Provided Important Message from Medicare    Referral Source admission list;physician    Reason for Consult discharge planning    Preferred Language English       Contact Information    Permission Granted to Share Info With                    Functional Status       Row Name 05/09/25 1501       Functional Status    Usual Activity Tolerance good    Current Activity Tolerance good       Physical Activity    On average, how many days per week do you engage in moderate to strenuous exercise (like a brisk walk)? 5 days    On average, how many minutes do you engage in exercise at this level? 30 min    Number of minutes of exercise per week 150       Assessment of Health Literacy    How often do you have someone help you read hospital materials? Never    How often do you have problems learning about your medical condition because of difficulty understanding written information? Never    How often do you have a problem understanding what is told to you about your medical condition? Never    How confident are you filling out medical forms by yourself? Quite a bit    Health Literacy Good       Functional Status, IADL    Medications independent    Meal Preparation independent    Housekeeping independent    Laundry independent    Shopping independent    If for any reason you need help with day-to-day activities such as bathing, preparing meals, shopping, managing finances, etc., do you get the help you need? I don't need any help       Mental Status    General Appearance WDL WDL        Mental Status Summary    Recent Changes in Mental Status/Cognitive Functioning no changes       Employment/    Employment Status retired                   Psychosocial    No documentation.                  Abuse/Neglect    No documentation.                  Legal    No documentation.                  Substance Abuse    No documentation.                  Patient Forms    No documentation.                     Bre Menendez RN

## 2025-05-09 NOTE — DISCHARGE PLACEMENT REQUEST
"Nadir Bennett (74 y.o. Male)       Date of Birth   1950    Social Security Number       Address   [address] [place], [place] [address]    Home Phone   [phone]    MRN   [id number]       [facility]    Marital Status                               Admission Date   5/7/2025    Admission Type   Elective    Admitting Provider   Jr Kg Grissom MD    Attending Provider   Jr Kg Grissom MD    Department, Room/Bed   [facility] CARDIOVASCULAR CARE UNIT, 2218/1       Discharge Date       Discharge Disposition       Discharge Destination                                 Attending Provider: Jr Kg Grissom MD    Allergies: No Known Allergies    Isolation: None   Infection: None   Code Status: CPR    Ht: 182.9 cm (72\")   Wt: 96.1 kg (211 lb 12.8 oz)    Admission Cmt: None   Principal Problem: CAD (coronary artery disease), native coronary artery [I25.10]                   Active Insurance as of 5/7/2025       Primary Coverage       Payor Plan Insurance Group Employer/Plan Group    MEDICARE MEDICARE A & B        Payor Plan Address Payor Plan Phone Number Payor Plan Fax Number Effective Dates    [address] [phone]  6/1/2014 - None Entered    [facility] [id number]         Subscriber Name Subscriber Birth Date Member ID       NADIR BENNETT 1950 [id number]               Secondary Coverage       Payor Plan Insurance Group Employer/Plan Group    [facility] SUPP [id number]       Payor Plan Address Payor Plan Phone Number Payor Plan Fax Number Effective Dates    [address]   12/1/2016 - None Entered    [facility] [id number]         Subscriber Name Subscriber Birth Date Member ID       NADIR BENNETT 1950 [id number]                     Emergency Contacts        (Rel.) Home Phone Work Phone Mobile Phone    PRINCESS BENNETT (Spouse) [phone] -- [phone]    GAY BENNETT (Daughter) [phone] -- --                "

## 2025-05-09 NOTE — PLAN OF CARE
Goal Outcome Evaluation:  Plan of Care Reviewed With: patient           Outcome Evaluation: Pt in supine, awake and alert, c/o pain everywhere and rates at 8/10 level. patient performed bed mobility with modAx2, sit to stand with minAx2, ambulated about 50 feet with minAx2. Pt returned to supine with modAx2. Patient mobilizing slightly better today, able to tolerate increased ambulation distance, still with high pain c/o. PT will continue to follow.    Anticipated Discharge Disposition (PT): home with assist, home with home health

## 2025-05-09 NOTE — THERAPY TREATMENT NOTE
Patient Name: Marysol Bennett  : 1950    MRN: [id number]                              Today's Date: 2025       Admit Date: 2025    Visit Dx:     ICD-10-CM ICD-9-CM   1. S/P CABG (coronary artery bypass graft)  Z95.1 V45.81   2. Coronary artery disease of native artery of native heart with stable angina pectoris  I25.118 414.01     413.9   3. Coronary artery disease of native heart with stable angina pectoris, unspecified vessel or lesion type  I25.118 414.01     413.9   4. Abnormal findings on diagnostic imaging of heart and coronary circulation  R93.1 794.39     Patient Active Problem List   Diagnosis    Essential hypertension    Mixed hyperlipidemia    Diabetes mellitus    Arthritis    Diverticulosis    Gastroesophageal reflux disease without esophagitis    Medicare annual wellness visit, subsequent    Colon cancer screening    Chronic fatigue    Erectile dysfunction due to diseases classified elsewhere    Memory loss    Other acute pulmonary embolism with acute cor pulmonale    Elevated testosterone level in male    Anxiety    Chronic cough    Vertigo    Flank pain    Syncope with risk for coronary artery disease greater than 20% in next 10 years    Coronary artery disease of native heart with stable angina pectoris    Abnormal findings on diagnostic imaging of heart and coronary circulation    CAD (coronary artery disease)    Thrombophlebitis of superficial veins of right lower extremity    CAD (coronary artery disease), native coronary artery     Past Medical History:   Diagnosis Date    Arthritis     Coronary artery disease     Diverticulitis     DVT (deep venous thrombosis)     LEFT LEG, LOG ROLLED ONTO ANKLE AND CAUSED BLOOD CLOTS    GERD (gastroesophageal reflux disease)     Hyperlipidemia     Hypertension     Pulmonary embolism     RIGHT    SOB (shortness of breath)     Type 2 diabetes mellitus     Vertigo      Past Surgical History:   Procedure Laterality Date    APPENDECTOMY   1977    CARDIAC CATHETERIZATION N/A 3/24/2025    Procedure: Coronary angiography;  Surgeon: Jai Black MD;  Location: [facility] CATH INVASIVE LOCATION;  Service: Cardiovascular;  Laterality: N/A;    CARDIAC CATHETERIZATION N/A 3/24/2025    Procedure: Left heart cath;  Surgeon: Jai Black MD;  Location: [facility] CATH INVASIVE LOCATION;  Service: Cardiovascular;  Laterality: N/A;    CARDIAC CATHETERIZATION N/A 3/24/2025    Procedure: Resting Full Cycle Ratio-Procedure not performed;  Surgeon: Jai Black MD;  Location: [facility] CATH INVASIVE LOCATION;  Service: Cardiovascular;  Laterality: N/A;    COLONOSCOPY      COLONOSCOPY N/A 08/11/2023    Procedure: COLONOSCOPY INTO CECUM;  Surgeon: Jefferson Rouse Jr., MD;  Location: [facility] ENDOSCOPY;  Service: General;  Laterality: N/A;  PRE:  SCREENING /    POST: DIVERTICULOSIS, HEMORRHOIDS    CORONARY ARTERY BYPASS GRAFT N/A 5/7/2025    Procedure: NAMAN PER ANESTHESIA, STERNOTOMY, CORONARY ARTERY BYPASS GRAFTS X 3 USING ENDOSCOPICALLY HARVESTED LEFT GREATER SAPHENOUS VEIN, LEFT AND RIGHT IMAs, PRP;  Surgeon: Jr Kg Grissom MD;  Location: [facility] CVOR;  Service: Cardiothoracic;  Laterality: N/A;    HAND SURGERY Left     KNEE ARTHROSCOPY Right     KNEE CARTILAGE SURGERY Left     ROTATOR CUFF REPAIR Left 2005    TOTAL HIP ARTHROPLASTY Right 2005    TOTAL SHOULDER REVERSE ARTHROPLASTY Left 10/2020      General Information       Row Name 05/09/25 1307          Physical Therapy Time and Intention    Document Type therapy note (daily note)  -EM     Mode of Treatment individual therapy;physical therapy  -EM       Row Name 05/09/25 1307          General Information    Existing Precautions/Restrictions fall;cardiac;sternal  -EM               User Key  (r) = Recorded By, (t) = Taken By, (c) = Cosigned By      Initials Name Provider Type    EM Kasia Alvarado, PT Physical Therapist                   Mobility       Row Name 05/09/25 1307          Bed Mobility    Bed Mobility  supine-sit;sit-supine  -EM     Supine-Sit [place] (Bed Mobility) minimum assist (75% patient effort);2 person assist  -EM     Sit-Supine [place] (Bed Mobility) moderate assist (50% patient effort);2 person assist  -EM     Assistive Device (Bed Mobility) head of bed elevated  -EM       Row Name 05/09/25 1307          Sit-Stand Transfer    Sit-Stand [place] (Transfers) minimum assist (75% patient effort);2 person assist  -EM       Row Name 05/09/25 1307          Gait/Stairs (Locomotion)    [place] Level (Gait) minimum assist (75% patient effort);2 person assist  -EM     Distance in Feet (Gait) 50  -EM     Deviations/Abnormal Patterns (Gait) stride length decreased;gait speed decreased  -EM     Bilateral Gait Deviations forward flexed posture  -EM               User Key  (r) = Recorded By, (t) = Taken By, (c) = Cosigned By      Initials Name Provider Type    Kasia Brumfield PT Physical Therapist                   Obj/Interventions       Row Name 05/09/25 1308          Motor Skills    Therapeutic Exercise other (see comments)  5 reps per cardiac protocol, level 2  -EM               User Key  (r) = Recorded By, (t) = Taken By, (c) = Cosigned By      Initials Name Provider Type    EM Kasia Alvarado PT Physical Therapist                   Goals/Plan    No documentation.                  Clinical Impression       Row Name 05/09/25 1309          Pain    Pretreatment Pain Rating 8/10  -EM     Pain Location chest  -EM     Response to Pain Interventions activity participation with tolerable pain  -EM     Pre/Posttreatment Pain Comment RN reports patient had pain meds prior to therapy  -EM       Row Name 05/09/25 1309          Plan of Care Review    Plan of Care Reviewed With patient  -EM     Outcome Evaluation Pt in supine, awake and alert, c/o pain everywhere and rates at 8/10 level. patient performed bed mobility with modAx2, sit to stand with minAx2, ambulated about 50 feet with minAx2. Pt  returned to supine with modAx2. Patient mobilizing slightly better today, able to tolerate increased ambulation distance, still with high pain c/o. PT will continue to follow.  -EM       Row Name 05/09/25 1309          Therapy Assessment/Plan (PT)    Therapy Frequency (PT) 6 times/wk  -EM       Row Name 05/09/25 1309          Vital Signs    Pretreatment Heart Rate (beats/min) 77  -EM     Posttreatment Heart Rate (beats/min) 86  -EM     Pre SpO2 (%) 95  -EM     O2 Delivery Pre Treatment room air  -EM     Post SpO2 (%) 94  -EM     O2 Delivery Post Treatment room air  -EM       Row Name 05/09/25 1309          Positioning and Restraints    Pre-Treatment Position in bed  -EM     Post Treatment Position bed  -EM     In Bed supine;call light within reach;exit alarm on  -EM               User Key  (r) = Recorded By, (t) = Taken By, (c) = Cosigned By      Initials Name Provider Type    Kasia Brumfield, PT Physical Therapist                   Outcome Measures       Row Name 05/09/25 1314 05/09/25 0810       How much help from another person do you currently need...    Turning from your back to your side while in flat bed without using bedrails? 2  -EM 3  -SF    Moving from lying on back to sitting on the side of a flat bed without bedrails? 2  -EM 3  -SF    Moving to and from a bed to a chair (including a wheelchair)? 3  -EM 3  -SF    Standing up from a chair using your arms (e.g., wheelchair, bedside chair)? 3  -EM 2  -SF    Climbing 3-5 steps with a railing? 2  -EM 2  -SF    To walk in hospital room? 2  -EM 3  -SF    AM-PAC 6 Clicks Score (PT) 14  -EM 16  -SF    Highest Level of Mobility Goal 4 --> Transfer to chair/commode  -EM 5 --> Static standing  -SF              User Key  (r) = Recorded By, (t) = Taken By, (c) = Cosigned By      Initials Name Provider Type    Kasia Brumfield, PT Physical Therapist    Nalini Harris, RN Registered Nurse                                 Physical Therapy Education        Title: PT OT SLP Therapies (In Progress)       Topic: Physical Therapy (In Progress)       Point: Mobility training (Done)       Learning Progress Summary            Patient Acceptance, E, VU by EM at 5/9/2025 1314    Acceptance, E, NR by EM at 5/8/2025 1045                      Point: Home exercise program (Done)       Learning Progress Summary            Patient Acceptance, E, VU by EM at 5/9/2025 1314    Acceptance, E, NR by EM at 5/8/2025 1045                      Point: Body mechanics (Not Started)       Learner Progress:  Not documented in this visit.              Point: Precautions (Not Started)       Learner Progress:  Not documented in this visit.                              User Key       Initials Effective Dates Name Provider Type Discipline    EM 06/16/21 -  Kasia Alvarado PT Physical Therapist PT                  PT Recommendation and Plan  Planned Therapy Interventions (PT): bed mobility training, gait training, home exercise program, patient/family education, transfer training, strengthening  Outcome Evaluation: Pt in supine, awake and alert, c/o pain everywhere and rates at 8/10 level. patient performed bed mobility with modAx2, sit to stand with minAx2, ambulated about 50 feet with minAx2. Pt returned to supine with modAx2. Patient mobilizing slightly better today, able to tolerate increased ambulation distance, still with high pain c/o. PT will continue to follow.     Time Calculation:         PT Charges       Row Name 05/09/25 1314             Time Calculation    Start Time 0932  -EM      Stop Time 0950  -EM      Time Calculation (min) 18 min  -EM      PT Received On 05/09/25  -EM      PT - Next Appointment 05/10/25  -EM         Time Calculation- PT    Total Timed Code Minutes- PT 18 minute(s)  -EM         Timed Charges    82692 - PT Therapeutic Exercise Minutes 5  -EM      22806 - PT Therapeutic Activity Minutes 13  -EM         Total Minutes    Timed Charges Total Minutes 18  -EM        Total Minutes 18  -EM                User Key  (r) = Recorded By, (t) = Taken By, (c) = Cosigned By      Initials Name Provider Type    EM Kasia Alvarado, PT Physical Therapist                  Therapy Charges for Today       Code Description Service Date Service Provider Modifiers Qty    24239159331  PT THER PROC EA 15 MIN 5/8/2025 Kasia Alvarado, PT GP 1    90921306785 HC PT THERAPEUTIC ACT EA 15 MIN 5/8/2025 Kasia Alvarado, PT GP 1    23673697921 HC PT THER SUPP EA 15 MIN 5/8/2025 Kasia Alvarado, PT GP 1    28861162989 HC PT EVAL MOD COMPLEXITY 3 5/8/2025 Kasia Alvarado, PT GP 1    98930910075  PT THERAPEUTIC ACT EA 15 MIN 5/9/2025 Kasia Alvarado, PT GP 1    64721966953 HC PT THER SUPP EA 15 MIN 5/9/2025 Kasia Alvarado, PT GP 1            PT G-Codes  Outcome Measure Options: AM-PAC 6 Clicks Basic Mobility (PT)  AM-PAC 6 Clicks Score (PT): 14  PT Discharge Summary  Anticipated Discharge Disposition (PT): home with assist, home with home health    Kasia Alvarado, PT  5/9/2025

## 2025-05-09 NOTE — PLAN OF CARE
Goal Outcome Evaluation:              Outcome Evaluation: AOx4, on RA, NSR on tele, all other VSS. IJ removed per order. Chest tubes removed per order--ángel to bulb remaining. Wires in place and on, but has not been pacing. Thomason removed per order. Due to void. Pain treated per MAR. Pt educated on proper call light usage and call light was placed within reach.

## 2025-05-09 NOTE — PLAN OF CARE
Goal Outcome Evaluation:  Plan of Care Reviewed With: patient        Progress: improving  Outcome Evaluation: VSS; A&Ox3, confused about time. Pt s/p CABGx3; POD#2. Pt continues on room air; sat 94-96. Pt remains SR on the monitor; rates in the 60s-80s. Pt continues with RIJ; F/C; 2 Med CT marked @ 260 w 50ml out; 2Pl CT marked at 220 w 50ml; and [place] to bulb with 20 out. Pt with c/o of pain controlled by prn pain med. Plan of care is ongoing.

## 2025-05-09 NOTE — CASE MANAGEMENT/SOCIAL WORK
Discharge Planning Assessment  [facility]     Patient Name: Marysol Bennett  MRN: [id number]  Today's Date: 5/9/2025    Admit Date: 5/7/2025        Discharge Needs Assessment       Row Name 05/09/25 1502       Living Environment    People in Home spouse    Name(s) of People in Home Nivia Elder/spouse    Current Living Arrangements home    Potentially Unsafe Housing Conditions none    In the past 12 months has the electric, gas, oil, or water company threatened to shut off services in your home? No    Primary Care Provided by self    Provides Primary Care For no one    Family Caregiver if Needed spouse    Family Caregiver Names Spouse/Nivia    Quality of Family Relationships helpful;involved;supportive    Able to Return to Prior Arrangements yes       Resource/Environmental Concerns    Resource/Environmental Concerns home accessibility    Home Accessibility Concerns stairs to enter home  7    Transportation Concerns none       Transportation Needs    In the past 12 months, has lack of transportation kept you from medical appointments or from getting medications? no    In the past 12 months, has lack of transportation kept you from meetings, work, or from getting things needed for daily living? No       Food Insecurity    Within the past 12 months, you worried that your food would run out before you got the money to buy more. Never true    Within the past 12 months, the food you bought just didn't last and you didn't have money to get more. Never true       Transition Planning    Patient/Family Anticipates Transition to home with family    Patient/Family Anticipated Services at Transition home health care    Transportation Anticipated family or friend will provide       Discharge Needs Assessment    Readmission Within the Last 30 Days planned readmission    Equipment Currently Used at Home glucometer;scales;pulse ox;bath bench  CASSIE 3 glucometer; teakwood bathbench    Concerns to be Addressed discharge planning     Do you want help finding or keeping work or a job? I do not need or want help    Do you want help with school or training? For example, starting or completing job training or getting a high school diploma, GED or equivalent No    Anticipated Changes Related to Illness none    Equipment Needed After Discharge none    Discharge Facility/Level of Care Needs home with home health    Provided Post Acute Provider Quality & Resource List? Yes    Post Acute Provider Quality and Resource List Home Health    Delivered To Patient    Method of Delivery In person    Offered/Gave Vendor List yes    Patient's Choice of Community Agency(s) [facility]                   Discharge Plan    No documentation.                 Continued Care and Services - Admitted Since 5/7/2025       Home Medical Care       Service Provider Request Status Services Address Phone Fax Patient Preferred    [facility] Pending - Request Sent -- [address], [address] [address] [phone] --                  Expected Discharge Date and Time       Expected Discharge Date Expected Discharge Time    May 13, 2025            Demographic Summary       Row Name 05/09/25 1501       General Information    Admission Type inpatient    Arrived From home    Required Notices Provided Important Message from Medicare    Referral Source admission list;physician    Reason for Consult discharge planning    Preferred Language English       Contact Information    Permission Granted to Share Info With                    Functional Status       Row Name 05/09/25 1501       Functional Status    Usual Activity Tolerance good    Current Activity Tolerance good       Physical Activity    On average, how many days per week do you engage in moderate to strenuous exercise (like a brisk walk)? 5 days    On average, how many minutes do you engage in exercise at this level? 30 min    Number of minutes of  exercise per week 150       Assessment of Health Literacy    How often do you have someone help you read hospital materials? Never    How often do you have problems learning about your medical condition because of difficulty understanding written information? Never    How often do you have a problem understanding what is told to you about your medical condition? Never    How confident are you filling out medical forms by yourself? Quite a bit    Health Literacy Good       Functional Status, IADL    Medications independent    Meal Preparation independent    Housekeeping independent    Laundry independent    Shopping independent    If for any reason you need help with day-to-day activities such as bathing, preparing meals, shopping, managing finances, etc., do you get the help you need? I don't need any help       Mental Status    General Appearance WDL WDL       Mental Status Summary    Recent Changes in Mental Status/Cognitive Functioning no changes       Employment/    Employment Status retired                   Psychosocial    No documentation.                  Abuse/Neglect    No documentation.                  Legal    No documentation.                  Substance Abuse    No documentation.                  Patient Forms    No documentation.                     Bre Menendez RN

## 2025-05-09 NOTE — PROGRESS NOTES
" LOS: 2 days   Patient Care Team:  Lavell Dunaway MD as PCP - General (Family Medicine)  Papo Gale MD as Consulting Physician (Hematology and Oncology)  Rachael Reynaga MD as Referring Physician (Hospitalist)    Chief Complaint: Post-op follow-up, s/p CABG       Subjective: Making progress.  Working with therapy.  2L NC.  Complains of normal postoperative pain.    Vital Signs  Temp:  [97.9 °F (36.6 °C)-100 °F (37.8 °C)] 98.4 °F (36.9 °C)  Heart Rate:  [67-80] 73  Resp:  [16-23] 16  BP: ()/(51-66) 110/52      05/07/25  0533 05/08/25  0600 05/09/25  0450   Weight: 93.6 kg (206 lb 6.4 oz) 94.9 kg (209 lb 3.5 oz) 96.1 kg (211 lb 12.8 oz)     Body mass index is 28.73 kg/m².    Intake/Output Summary (Last 24 hours) at 5/9/2025 1131  Last data filed at 5/9/2025 0810  Gross per 24 hour   Intake 1570 ml   Output 1403 ml   Net 167 ml     I/O this shift:  In: 360 [P.O.:360]  Out: 228 [Urine:200; Chest Tube:28]    Chest tube drainage last 8 hours/24 hours: Bulb 20/60-- MS x 2 30/100-- B Pleural 10/120    Objective:  Vital signs: (most recent): Blood pressure 110/52, pulse 73, temperature 98.4 °F (36.9 °C), temperature source Oral, resp. rate 16, height 182.9 cm (72\"), weight 96.1 kg (211 lb 12.8 oz), SpO2 94%.              Physical Examination:   General appearance - alert, well appearing, and in no distress  Mental status - normal mood, behavior, speech, dress, motor activity, and thought processes  Chest - clear to auscultation, no wheezes, rales or rhonchi, symmetric air entry, no tachypnea, retractions or cyanosis  Heart - normal rate, regular rhythm, normal S1, S2, no murmurs, rubs, clicks or gallops  Abdomen - soft, nontender, nondistended, no masses or organomegaly  bowel sounds normal, + flatus, - BM  Neurological - alert, oriented, normal speech, no focal findings or movement disorder noted, motor and sensory grossly normal bilaterally  Extremities - peripheral pulses normal, no pedal edema, no " clubbing or cyanosis, post-CABG donor site noted on left leg  Skin - normal coloration and turgor, no rashes, no suspicious skin lesions noted  Midsternal and chest tube dressing CDI    Results Review:      WBC WBC   Date Value Ref Range Status   05/09/2025 9.26 3.40 - 10.80 10*3/mm3 Final   05/08/2025 9.40 3.40 - 10.80 10*3/mm3 Final   05/07/2025 10.62 3.40 - 10.80 10*3/mm3 Final   05/07/2025 11.09 (H) 3.40 - 10.80 10*3/mm3 Final      HGB Hemoglobin   Date Value Ref Range Status   05/09/2025 8.3 (L) 13.0 - 17.7 g/dL Final   05/08/2025 8.3 (L) 13.0 - 17.7 g/dL Final   05/07/2025 10.1 (L) 13.0 - 17.7 g/dL Final   05/07/2025 8.8 (L) 13.0 - 17.7 g/dL Final   05/07/2025 7.8 (C) 12.0 - 17.0 g/dL Final   05/07/2025 7.5 (C) 12.0 - 17.0 g/dL Final   05/07/2025 8.2 (L) 12.0 - 17.0 g/dL Final   05/07/2025 7.1 (C) 12.0 - 17.0 g/dL Final   05/07/2025 6.8 (C) 12.0 - 17.0 g/dL Final   05/07/2025 8.2 (L) 12.0 - 17.0 g/dL Final      HCT Hematocrit   Date Value Ref Range Status   05/09/2025 26.3 (L) 37.5 - 51.0 % Final   05/08/2025 26.2 (L) 37.5 - 51.0 % Final   05/07/2025 31.4 (L) 37.5 - 51.0 % Final   05/07/2025 27.8 (L) 37.5 - 51.0 % Final   05/07/2025 23 (L) 38 - 51 % Final   05/07/2025 22 (L) 38 - 51 % Final   05/07/2025 24 (L) 38 - 51 % Final   05/07/2025 21 (L) 38 - 51 % Final   05/07/2025 20 (L) 38 - 51 % Final   05/07/2025 24 (L) 38 - 51 % Final      Platelets Platelets   Date Value Ref Range Status   05/09/2025 155 140 - 450 10*3/mm3 Final   05/08/2025 170 140 - 450 10*3/mm3 Final   05/07/2025 192 140 - 450 10*3/mm3 Final   05/07/2025 204 140 - 450 10*3/mm3 Final   05/07/2025 234 140 - 450 10*3/mm3 Final        PT/INR:    Protime   Date Value Ref Range Status   05/08/2025 16.2 (H) 11.7 - 14.2 Seconds Final   05/07/2025 15.9 (H) 11.7 - 14.2 Seconds Final   /  INR   Date Value Ref Range Status   05/08/2025 1.30 (H) 0.90 - 1.10 Final   05/07/2025 1.27 (H) 0.90 - 1.10 Final       Sodium Sodium   Date Value Ref Range Status    05/09/2025 134 (L) 136 - 145 mmol/L Final   05/08/2025 141 136 - 145 mmol/L Final   05/07/2025 140 136 - 145 mmol/L Final   05/07/2025 143 136 - 145 mmol/L Final      Potassium Potassium   Date Value Ref Range Status   05/09/2025 4.7 3.5 - 5.2 mmol/L Final   05/08/2025 4.1 3.5 - 5.2 mmol/L Final   05/07/2025 4.3 3.5 - 5.2 mmol/L Final   05/07/2025 4.6 3.5 - 5.2 mmol/L Final      Chloride Chloride   Date Value Ref Range Status   05/09/2025 101 98 - 107 mmol/L Final   05/08/2025 108 (H) 98 - 107 mmol/L Final   05/07/2025 109 (H) 98 - 107 mmol/L Final   05/07/2025 110 (H) 98 - 107 mmol/L Final      Bicarbonate CO2   Date Value Ref Range Status   05/09/2025 23.0 22.0 - 29.0 mmol/L Final   05/08/2025 21.7 (L) 22.0 - 29.0 mmol/L Final   05/07/2025 22.0 22.0 - 29.0 mmol/L Final   05/07/2025 23.8 22.0 - 29.0 mmol/L Final      BUN BUN   Date Value Ref Range Status   05/09/2025 37 (H) 8 - 23 mg/dL Final   05/08/2025 30 (H) 8 - 23 mg/dL Final   05/07/2025 31 (H) 8 - 23 mg/dL Final   05/07/2025 32 (H) 8 - 23 mg/dL Final      Creatinine Creatinine   Date Value Ref Range Status   05/09/2025 1.60 (H) 0.76 - 1.27 mg/dL Final   05/08/2025 1.35 (H) 0.76 - 1.27 mg/dL Final   05/07/2025 1.38 (H) 0.76 - 1.27 mg/dL Final   05/07/2025 1.42 (H) 0.76 - 1.27 mg/dL Final      Calcium Calcium   Date Value Ref Range Status   05/09/2025 8.6 8.6 - 10.5 mg/dL Final   05/08/2025 8.6 8.6 - 10.5 mg/dL Final   05/07/2025 9.0 8.6 - 10.5 mg/dL Final   05/07/2025 8.9 8.6 - 10.5 mg/dL Final      Magnesium Magnesium   Date Value Ref Range Status   05/08/2025 2.8 (H) 1.6 - 2.4 mg/dL Final   05/07/2025 2.6 (H) 1.6 - 2.4 mg/dL Final   05/07/2025 3.2 (H) 1.6 - 2.4 mg/dL Final          aspirin, 81 mg, Oral, Daily  atorvastatin, 40 mg, Oral, Nightly  enoxaparin sodium, 40 mg, Subcutaneous, Q24H  gabapentin, 200 mg, Oral, Q12H  guaiFENesin, 1,200 mg, Oral, Q12H  insulin glargine, 15 Units, Subcutaneous, Daily  insulin lispro, 2-7 Units, Subcutaneous, 4x Daily  AC & at Bedtime  Lidocaine, 2 patch, Transdermal, Q24H  metoprolol tartrate, 12.5 mg, Oral, Q12H  mupirocin, 1 Application, Each Nare, BID  pantoprazole, 40 mg, Intravenous, Once   Followed by  pantoprazole, 40 mg, Oral, Q AM  polyethylene glycol, 17 g, Oral, Daily  senna-docusate sodium, 2 tablet, Oral, Nightly  sertraline, 25 mg, Oral, Daily             CAD (coronary artery disease), native coronary artery    Coronary artery disease of native heart with stable angina pectoris    Abnormal findings on diagnostic imaging of heart and coronary circulation      Assessment & Plan    Multivessel CAD--- s/p CABG x 3 (LIMA to OM1, DIPIKA to LAD, SVG to RCA) - Dr. Grissom  Type 2 diabetes  Hypertension-- BB as tolerated  Hyperlipidemia-- statin  History of recent DVT  History of pulmonary embolism  Anxiety/depression-- home sertraline  VICTOR MANUEL--- Cr stable, monitor  Postop anemia- expected ABLA, monitor closely         POD # 2  - Up working with therapy.  Reports postoperative pain.  He looks fairly weak and unsteady.  On room air.  - Discontinue central line and Thomason.  Okay to discontinue chest tubes.  Will keep [place]w drain till closer to discharge.  - Net positive.  Up 5 pounds from admission.  Will give a dose of IV Lasix and see what his response is.   - Blood sugars very elevated.  Will start daily p.o. glipizide.  Will add mealtime insulin, transition to moderate dose sliding scale, and increase Lantus to 20 units daily.  If blood sugars continue to be uncontrolled, may consult [facility].   - Encouraged continued mobilization and frequent pulmonary toileting.  - Continue routine and supportive care.   - Overall he is making progress.  Likely home beginning of next week.      Poonam Butler, APRN  05/09/25  11:31 EDT

## 2025-05-09 NOTE — CONSULTS
Met with patient to discuss the benefits of cardiac rehab. Will plan to meet with pt again prior to discharge. Pt is not ready to discuss discharge plans.

## 2025-05-09 NOTE — PROGRESS NOTES
"    Patient Name: Marysol Bennett  :1950  74 y.o.      Patient Care Team:  Lavell Dunaway MD as PCP - General (Family Medicine)  Papo Gale MD as Consulting Physician (Hematology and Oncology)  Rachael Reynaga MD as Referring Physician (Hospitalist)    Chief Complaint:   S/p CABG    Interval History:   Transferred to step down, now on room air.     Objective   Vital Signs  Temp:  [97.9 °F (36.6 °C)-100 °F (37.8 °C)] 98.4 °F (36.9 °C)  Heart Rate:  [67-86] 73  Resp:  [16-23] 16  BP: ()/(51-69) 110/52    Intake/Output Summary (Last 24 hours) at 2025 0812  Last data filed at 2025 0600  Gross per 24 hour   Intake 1210 ml   Output 1230 ml   Net -20 ml     Flowsheet Rows      Flowsheet Row First Filed Value   Admission Height 182.9 cm (72\") Documented at 2025 0533   Admission Weight 93.6 kg (206 lb 6.4 oz) Documented at 2025 0533            GEN: no distress, alert and oriented  HEENT: NACT, EOMI, moist mucous membranes, RIJ line  Lungs: CTAB, no wheezes, rales or rhonchi, + chest tubes  CV: normal rate, regular rhythm, normal S1, S2, no murmurs, +2 radial pulses b/l, no carotid bruit  Abdomen: soft, nontender, nondistended, NABS  Extremities: no edema  Skin: no rash, warm, dry  Heme/Lymph: no bruising  Psych: organized thought, normal behavior and affect    Results Review:    Results from last 7 days   Lab Units 25  0426   SODIUM mmol/L 134*   POTASSIUM mmol/L 4.7   CHLORIDE mmol/L 101   CO2 mmol/L 23.0   BUN mg/dL 37*   CREATININE mg/dL 1.60*   GLUCOSE mg/dL 276*   CALCIUM mg/dL 8.6         Results from last 7 days   Lab Units 25  0426   WBC 10*3/mm3 9.26   HEMOGLOBIN g/dL 8.3*   HEMATOCRIT % 26.3*   PLATELETS 10*3/mm3 155     Results from last 7 days   Lab Units 25  0309 25  1139 25  0829   INR  1.30* 1.27* 1.04   APTT seconds  --  30.9 27.5     Results from last 7 days   Lab Units 25  0309   MAGNESIUM mg/dL 2.8*     Results from last " 7 days   Lab Units 05/05/25  0829   CHOLESTEROL mg/dL 126   TRIGLYCERIDES mg/dL 100   HDL CHOL mg/dL 57   LDL CHOL mg/dL 50               Medication Review:   acetaminophen, 1,000 mg, Oral, Q6H  aspirin, 81 mg, Oral, Daily  atorvastatin, 40 mg, Oral, Nightly  enoxaparin sodium, 40 mg, Subcutaneous, Q24H  gabapentin, 200 mg, Oral, Q12H  guaiFENesin, 1,200 mg, Oral, Q12H  insulin glargine, 15 Units, Subcutaneous, Daily  insulin lispro, 2-7 Units, Subcutaneous, 4x Daily AC & at Bedtime  Lidocaine, 2 patch, Transdermal, Q24H  metoprolol tartrate, 12.5 mg, Oral, Q12H  mupirocin, 1 Application, Each Nare, BID  pantoprazole, 40 mg, Intravenous, Once   Followed by  pantoprazole, 40 mg, Oral, Q AM  senna-docusate sodium, 2 tablet, Oral, Nightly  sertraline, 25 mg, Oral, Daily              Assessment & Plan   #Multivessel CAD status post CABG  #VICTOR MANUEL  #Anemia  #Hypertension  #Hyperlipidemia  #Diabetes     74 y.o. male, seen by Dr Yoo, with hypertension, hyperlipidemia, diabetes, history of provoked PE, recently found multivessel CAD, superficial vein thrombosis of the right greater saphenous vein who presented for elective CABG.  He underwent this yesterday with Dr. Grissom (CABG x 3, DIPIKA to LAD, LIMA to OM, SVG to RCA.)      He is doing great, hemodynamically stable.      Continue routine postop management.    Jai Fisher MD, FACC, [facility]  05/09/25  08:12 EDT

## 2025-05-10 ENCOUNTER — APPOINTMENT (OUTPATIENT)
Dept: GENERAL RADIOLOGY | Facility: HOSPITAL | Age: 75
DRG: 236 | End: 2025-05-10
Payer: MEDICARE

## 2025-05-10 LAB
ANION GAP SERPL CALCULATED.3IONS-SCNC: 10.5 MMOL/L (ref 5–15)
BUN SERPL-MCNC: 42 MG/DL (ref 8–23)
BUN/CREAT SERPL: 30.4 (ref 7–25)
CALCIUM SPEC-SCNC: 8.4 MG/DL (ref 8.6–10.5)
CHLORIDE SERPL-SCNC: 100 MMOL/L (ref 98–107)
CO2 SERPL-SCNC: 22.5 MMOL/L (ref 22–29)
CREAT SERPL-MCNC: 1.38 MG/DL (ref 0.76–1.27)
DEPRECATED RDW RBC AUTO: 48.5 FL (ref 37–54)
EGFRCR SERPLBLD CKD-EPI 2021: 53.3 ML/MIN/1.73
ERYTHROCYTE [DISTWIDTH] IN BLOOD BY AUTOMATED COUNT: 15.3 % (ref 12.3–15.4)
GLUCOSE BLDC GLUCOMTR-MCNC: 210 MG/DL (ref 70–130)
GLUCOSE BLDC GLUCOMTR-MCNC: 237 MG/DL (ref 70–130)
GLUCOSE BLDC GLUCOMTR-MCNC: 268 MG/DL (ref 70–130)
GLUCOSE BLDC GLUCOMTR-MCNC: 269 MG/DL (ref 70–130)
GLUCOSE SERPL-MCNC: 195 MG/DL (ref 65–99)
HCT VFR BLD AUTO: 25.4 % (ref 37.5–51)
HGB BLD-MCNC: 7.9 G/DL (ref 13–17.7)
MCH RBC QN AUTO: 26.9 PG (ref 26.6–33)
MCHC RBC AUTO-ENTMCNC: 31.1 G/DL (ref 31.5–35.7)
MCV RBC AUTO: 86.4 FL (ref 79–97)
PLATELET # BLD AUTO: 161 10*3/MM3 (ref 140–450)
PMV BLD AUTO: 10.6 FL (ref 6–12)
POTASSIUM SERPL-SCNC: 4 MMOL/L (ref 3.5–5.2)
RBC # BLD AUTO: 2.94 10*6/MM3 (ref 4.14–5.8)
SODIUM SERPL-SCNC: 133 MMOL/L (ref 136–145)
WBC NRBC COR # BLD AUTO: 8.85 10*3/MM3 (ref 3.4–10.8)

## 2025-05-10 PROCEDURE — 80048 BASIC METABOLIC PNL TOTAL CA: CPT | Performed by: PHYSICIAN ASSISTANT

## 2025-05-10 PROCEDURE — 63710000001 INSULIN GLARGINE PER 5 UNITS

## 2025-05-10 PROCEDURE — 63710000001 INSULIN LISPRO (HUMAN) PER 5 UNITS

## 2025-05-10 PROCEDURE — 63710000001 INSULIN LISPRO (HUMAN) PER 5 UNITS: Performed by: STUDENT IN AN ORGANIZED HEALTH CARE EDUCATION/TRAINING PROGRAM

## 2025-05-10 PROCEDURE — 71046 X-RAY EXAM CHEST 2 VIEWS: CPT

## 2025-05-10 PROCEDURE — 85027 COMPLETE CBC AUTOMATED: CPT | Performed by: PHYSICIAN ASSISTANT

## 2025-05-10 PROCEDURE — 94640 AIRWAY INHALATION TREATMENT: CPT

## 2025-05-10 PROCEDURE — 82948 REAGENT STRIP/BLOOD GLUCOSE: CPT

## 2025-05-10 PROCEDURE — 94799 UNLISTED PULMONARY SVC/PX: CPT

## 2025-05-10 PROCEDURE — 63710000001 INSULIN GLARGINE PER 5 UNITS: Performed by: STUDENT IN AN ORGANIZED HEALTH CARE EDUCATION/TRAINING PROGRAM

## 2025-05-10 PROCEDURE — 99232 SBSQ HOSP IP/OBS MODERATE 35: CPT | Performed by: STUDENT IN AN ORGANIZED HEALTH CARE EDUCATION/TRAINING PROGRAM

## 2025-05-10 PROCEDURE — 25010000002 FUROSEMIDE PER 20 MG: Performed by: PHYSICIAN ASSISTANT

## 2025-05-10 PROCEDURE — 99024 POSTOP FOLLOW-UP VISIT: CPT | Performed by: PHYSICIAN ASSISTANT

## 2025-05-10 PROCEDURE — 25010000002 ENOXAPARIN PER 10 MG: Performed by: PHYSICIAN ASSISTANT

## 2025-05-10 RX ORDER — POTASSIUM CHLORIDE 1500 MG/1
20 TABLET, EXTENDED RELEASE ORAL ONCE
Status: COMPLETED | OUTPATIENT
Start: 2025-05-10 | End: 2025-05-10

## 2025-05-10 RX ORDER — FUROSEMIDE 10 MG/ML
40 INJECTION INTRAMUSCULAR; INTRAVENOUS ONCE
Status: COMPLETED | OUTPATIENT
Start: 2025-05-10 | End: 2025-05-10

## 2025-05-10 RX ORDER — IPRATROPIUM BROMIDE AND ALBUTEROL SULFATE 2.5; .5 MG/3ML; MG/3ML
3 SOLUTION RESPIRATORY (INHALATION) EVERY 6 HOURS PRN
Status: DISCONTINUED | OUTPATIENT
Start: 2025-05-10 | End: 2025-05-12 | Stop reason: HOSPADM

## 2025-05-10 RX ORDER — INSULIN LISPRO 100 [IU]/ML
6 INJECTION, SOLUTION INTRAVENOUS; SUBCUTANEOUS
Status: DISCONTINUED | OUTPATIENT
Start: 2025-05-10 | End: 2025-05-12

## 2025-05-10 RX ORDER — FERROUS SULFATE 325(65) MG
325 TABLET ORAL
Status: DISCONTINUED | OUTPATIENT
Start: 2025-05-10 | End: 2025-05-12 | Stop reason: HOSPADM

## 2025-05-10 RX ADMIN — IPRATROPIUM BROMIDE AND ALBUTEROL SULFATE 3 ML: .5; 3 SOLUTION RESPIRATORY (INHALATION) at 17:33

## 2025-05-10 RX ADMIN — SERTRALINE HYDROCHLORIDE 25 MG: 25 TABLET ORAL at 08:44

## 2025-05-10 RX ADMIN — GLIPIZIDE 10 MG: 5 TABLET ORAL at 06:42

## 2025-05-10 RX ADMIN — GABAPENTIN 200 MG: 100 CAPSULE ORAL at 08:44

## 2025-05-10 RX ADMIN — SENNOSIDES AND DOCUSATE SODIUM 2 TABLET: 50; 8.6 TABLET ORAL at 20:40

## 2025-05-10 RX ADMIN — ENOXAPARIN SODIUM 40 MG: 100 INJECTION SUBCUTANEOUS at 16:00

## 2025-05-10 RX ADMIN — INSULIN GLARGINE 20 UNITS: 100 INJECTION, SOLUTION SUBCUTANEOUS at 08:43

## 2025-05-10 RX ADMIN — POTASSIUM CHLORIDE 20 MEQ: 1500 TABLET, EXTENDED RELEASE ORAL at 08:44

## 2025-05-10 RX ADMIN — PANTOPRAZOLE SODIUM 40 MG: 40 TABLET, DELAYED RELEASE ORAL at 06:42

## 2025-05-10 RX ADMIN — IPRATROPIUM BROMIDE AND ALBUTEROL SULFATE 3 ML: .5; 3 SOLUTION RESPIRATORY (INHALATION) at 11:43

## 2025-05-10 RX ADMIN — MUPIROCIN 1 APPLICATION: 20 OINTMENT TOPICAL at 20:51

## 2025-05-10 RX ADMIN — INSULIN LISPRO 6 UNITS: 100 INJECTION, SOLUTION INTRAVENOUS; SUBCUTANEOUS at 20:40

## 2025-05-10 RX ADMIN — GABAPENTIN 200 MG: 100 CAPSULE ORAL at 20:41

## 2025-05-10 RX ADMIN — GUAIFENESIN 1200 MG: 600 TABLET, MULTILAYER, EXTENDED RELEASE ORAL at 08:43

## 2025-05-10 RX ADMIN — ALPRAZOLAM 0.25 MG: 0.25 TABLET ORAL at 22:20

## 2025-05-10 RX ADMIN — POTASSIUM CHLORIDE 20 MEQ: 1500 TABLET, EXTENDED RELEASE ORAL at 15:59

## 2025-05-10 RX ADMIN — INSULIN LISPRO 6 UNITS: 100 INJECTION, SOLUTION INTRAVENOUS; SUBCUTANEOUS at 11:18

## 2025-05-10 RX ADMIN — INSULIN GLARGINE 5 UNITS: 100 INJECTION, SOLUTION SUBCUTANEOUS at 15:59

## 2025-05-10 RX ADMIN — INSULIN LISPRO 4 UNITS: 100 INJECTION, SOLUTION INTRAVENOUS; SUBCUTANEOUS at 16:10

## 2025-05-10 RX ADMIN — CYCLOBENZAPRINE HYDROCHLORIDE 10 MG: 10 TABLET, FILM COATED ORAL at 23:38

## 2025-05-10 RX ADMIN — HYDROCODONE BITARTRATE AND ACETAMINOPHEN 2 TABLET: 5; 325 TABLET ORAL at 05:04

## 2025-05-10 RX ADMIN — FUROSEMIDE 40 MG: 40 TABLET ORAL at 08:44

## 2025-05-10 RX ADMIN — INSULIN LISPRO 4 UNITS: 100 INJECTION, SOLUTION INTRAVENOUS; SUBCUTANEOUS at 06:42

## 2025-05-10 RX ADMIN — FERROUS SULFATE TAB 325 MG (65 MG ELEMENTAL FE) 325 MG: 325 (65 FE) TAB at 11:18

## 2025-05-10 RX ADMIN — ATORVASTATIN CALCIUM 40 MG: 20 TABLET, FILM COATED ORAL at 20:40

## 2025-05-10 RX ADMIN — METOPROLOL TARTRATE 12.5 MG: 25 TABLET, FILM COATED ORAL at 20:40

## 2025-05-10 RX ADMIN — ASPIRIN 81 MG: 81 TABLET, COATED ORAL at 08:43

## 2025-05-10 RX ADMIN — FUROSEMIDE 40 MG: 10 INJECTION, SOLUTION INTRAMUSCULAR; INTRAVENOUS at 15:59

## 2025-05-10 RX ADMIN — POLYETHYLENE GLYCOL 3350 17 G: 17 POWDER, FOR SOLUTION ORAL at 08:43

## 2025-05-10 RX ADMIN — LIDOCAINE 2 PATCH: 4 PATCH TOPICAL at 08:43

## 2025-05-10 RX ADMIN — INSULIN LISPRO 3 UNITS: 100 INJECTION, SOLUTION INTRAVENOUS; SUBCUTANEOUS at 08:42

## 2025-05-10 RX ADMIN — GUAIFENESIN 1200 MG: 600 TABLET, MULTILAYER, EXTENDED RELEASE ORAL at 20:41

## 2025-05-10 RX ADMIN — INSULIN LISPRO 6 UNITS: 100 INJECTION, SOLUTION INTRAVENOUS; SUBCUTANEOUS at 16:10

## 2025-05-10 RX ADMIN — MUPIROCIN 1 APPLICATION: 20 OINTMENT TOPICAL at 08:43

## 2025-05-10 NOTE — PLAN OF CARE
Goal Outcome Evaluation:  Plan of Care Reviewed With: patient        Progress: improving  Outcome Evaluation: VSS; A&Ox4. Pt is s/p a CABGx3 POD#3. Pt remains on room air; sat 96-98. Pt -127. Pt continues with pw and external pacer that is attached and on. Pt with a ángel to bulb w 30ml out. Pt with c/o of pain controlled by prn pain med. Pt respiratory rate increases over 20 with walk to bathroom and takes time to recover. Plan of care is ongoing.

## 2025-05-10 NOTE — PROGRESS NOTES
Patient Name:  Marysol Bennett  YOB: 1950  MRN:  [id number]  Date of Admission:  5/7/2025  Date of Consult:  5/10/2025  Patient Care Team:  Lavell Dunaway MD as PCP - General (Family Medicine)  Papo Gale MD as Consulting Physician (Hematology and Oncology)  Rachael Reynaga MD as Referring Physician (Hospitalist)    Consults  Subjective   History of Present Illness  Mr. Bennett is a 74 y.o. male that has been admitted to [facility] following elective NAMAN PER ANESTHESIA, STERNOTOMY, CORONARY ARTERY BYPASS GRAFTS X 3 USING ENDOSCOPICALLY HARVESTED LEFT GREATER SAPHENOUS VEIN, LEFT AND RIGHT IMAs, PRP.  He has been admitted to an orthopedic floor following surgery and we were asked to see and assist with his medical problems, specifically relating to his diabetes.  At the time of my visit he denies nausea or vomiting.  Breathing feels comfortable.  He is having expected chest pain postoperatively due to incision.  He has tolerated a diet.  He does complain of expected postoperative discomfort.      Past Medical History:   Diagnosis Date    Arthritis     Coronary artery disease     Diverticulitis     DVT (deep venous thrombosis) 2023    LEFT LEG, LOG ROLLED ONTO ANKLE AND CAUSED BLOOD CLOTS    GERD (gastroesophageal reflux disease)     Hyperlipidemia     Hypertension     Pulmonary embolism 2023    RIGHT    SOB (shortness of breath)     Type 2 diabetes mellitus     Vertigo      Past Surgical History:   Procedure Laterality Date    APPENDECTOMY  1977    CARDIAC CATHETERIZATION N/A 3/24/2025    Procedure: Coronary angiography;  Surgeon: Jai Black MD;  Location: [facility] INVASIVE LOCATION;  Service: Cardiovascular;  Laterality: N/A;    CARDIAC CATHETERIZATION N/A 3/24/2025    Procedure: Left heart cath;  Surgeon: Jai Black MD;  Location: [facility] INVASIVE LOCATION;  Service: Cardiovascular;  Laterality: N/A;    CARDIAC CATHETERIZATION N/A 3/24/2025    Procedure:  Resting Full Cycle Ratio-Procedure not performed;  Surgeon: Jai Black MD;  Location: [facility] CATH INVASIVE LOCATION;  Service: Cardiovascular;  Laterality: N/A;    COLONOSCOPY      COLONOSCOPY N/A 08/11/2023    Procedure: COLONOSCOPY INTO CECUM;  Surgeon: Jefferson Rouse Jr., MD;  Location: [facility] ENDOSCOPY;  Service: General;  Laterality: N/A;  PRE:  SCREENING /    POST: DIVERTICULOSIS, HEMORRHOIDS    CORONARY ARTERY BYPASS GRAFT N/A 5/7/2025    Procedure: NAMAN PER ANESTHESIA, STERNOTOMY, CORONARY ARTERY BYPASS GRAFTS X 3 USING ENDOSCOPICALLY HARVESTED LEFT GREATER SAPHENOUS VEIN, LEFT AND RIGHT IMAs, PRP;  Surgeon: Jr Kg Grissom MD;  Location: [facility] CVOR;  Service: Cardiothoracic;  Laterality: N/A;    HAND SURGERY Left     KNEE ARTHROSCOPY Right     KNEE CARTILAGE SURGERY Left     ROTATOR CUFF REPAIR Left 2005    TOTAL HIP ARTHROPLASTY Right 2005    TOTAL SHOULDER REVERSE ARTHROPLASTY Left 10/2020     Family History   Problem Relation Age of Onset    Intracerebral hemorrhage Mother     Heart attack Father     Diabetes Father     Diabetes Sister     Diabetes Paternal Grandmother     Malig Hyperthermia Neg Hx      Social History     Tobacco Use    Smoking status: Never     Passive exposure: Past    Smokeless tobacco: Never   Vaping Use    Vaping status: Never Used   Substance Use Topics    Alcohol use: Yes     Comment: RARE / 12 beers a yr    Drug use: No     Medications Prior to Admission   Medication Sig Dispense Refill Last Dose/Taking    atorvastatin (LIPITOR) 40 MG tablet Take 1 tablet by mouth once daily 90 tablet 3 5/6/2025 at  8:00 AM    BD Pen Needle Meredith 2nd Gen 32G X 4 MM misc 1 each by Other route Daily.   Taking    chlorhexidine (PERIDEX) 0.12 % solution Apply 15 mL to the mouth or throat 2 (Two) Times a Day. PREOP   5/7/2025 at  3:00 AM    Cyanocobalamin (VITAMIN B-12 PO) Take 1 tablet by mouth Daily.   5/6/2025 at  8:00 AM    diphenhydrAMINE-acetaminophen (TYLENOL PM)  MG tablet per  tablet Take 1 tablet by mouth At Night As Needed for Sleep.   5/5/2025 at  9:00 PM    esomeprazole (nexIUM) 40 MG capsule TAKE 1 CAPSULE BY MOUTH ONCE DAILY IN THE MORNING BEFORE BREAKFAST 90 capsule 0 5/5/2025 at  7:00 AM    glimepiride (AMARYL) 4 MG tablet Take 2 tablets by mouth 2 (Two) Times a Day.   5/6/2025 at  8:00 AM    HumaLOG KwikPen 100 UNIT/ML solution pen-injector Inject 2 Units under the skin into the appropriate area as directed Daily With Breakfast, Lunch & Dinner.   Taking    hydrochlorothiazide (HYDRODIURIL) 12.5 MG tablet Take 1 tablet by mouth Daily.   5/6/2025 at  8:00 AM    irbesartan (AVAPRO) 300 MG tablet Take 1 tablet by mouth once daily (Patient taking differently: Take 1 tablet by mouth Daily. PT TO HOLD 24 HOURS PRIOR TO SURGERY) 90 tablet 0 5/5/2025 at  8:00 AM    meloxicam (MOBIC) 15 MG tablet Take 1 tablet by mouth once daily (Patient taking differently: Take 1 tablet by mouth Daily. PT TO HOLD PER MD INSTRUCTIONS) 90 tablet 0 5/5/2025    metFORMIN (GLUCOPHAGE) 500 MG tablet Take 2 tablets by mouth 2 (Two) Times a Day.   5/6/2025 at  8:00 AM    mupirocin (BACTROBAN) 2 % nasal ointment Administer 1 Application into the nostril(s) as directed by provider 2 (Two) Times a Day. PREOP   5/7/2025 at  3:00 AM    Potassium 99 MG tablet Take 99 mg by mouth Daily.   5/6/2025 at  8:00 AM    Prasterone, DHEA, (DHEA PO) Take 1 tablet by mouth Daily.   5/6/2025 at  8:00 AM    Probiotic Product (PROBIOTIC PO) Take 1 tablet by mouth Daily.   5/6/2025 at  8:00 AM    sertraline (ZOLOFT) 25 MG tablet Take 1 tablet by mouth Daily. 90 tablet 2 5/6/2025 at  8:00 AM    Tousylvain SoloStar 300 UNIT/ML solution pen-injector injection Inject 15 Units under the skin into the appropriate area as directed Daily.   5/6/2025 at  8:00 AM    Trulicity 0.75 MG/0.5ML solution pen-injector Inject 0.75 mg as directed 1 (One) Time Per Week. SUNDAY. HELD FOR SURGERY. LAST DOSE 4/27/25 4/27/2025    TURMERIC PO Take 1 tablet by  mouth Daily.   5/6/2025 at  8:00 AM     Allergies:  Patient has no known allergies.    Review of Systems    Objective      Vital Signs  Temp:  [97.4 °F (36.3 °C)-98.6 °F (37 °C)] 98.6 °F (37 °C)  Heart Rate:  [65-82] 76  Resp:  [16-25] 20  BP: (104-146)/(56-92) 126/56  Body mass index is 28.93 kg/m².    Physical Exam  General: Alert, no acute distress.  Sitting up in chair at bedside.  Uncomfortable appearing.  ENT: No conjunctival injection or scleral icterus. Moist mucous membranes.  Neuro: Eyes open and moving in all directions, face symmetric, generalized weakness, no focal deficits.   Lungs: Diminished aeration, occasional crackle, no wheezing, no distress  Heart: RRR, no murmurs.   Abdomen: Soft, non-tender, non-distended. Normal bowel sounds.   Ext: Warm and well-perfused. No edema.   Skin: Midline sternotomy incision       Assessment/Plan     Active Hospital Problems    Diagnosis  POA    **CAD (coronary artery disease), native coronary artery [I25.10]  Yes    Coronary artery disease of native heart with stable angina pectoris [I25.118]  Unknown    Abnormal findings on diagnostic imaging of heart and coronary circulation [R93.1]  Unknown      Resolved Hospital Problems   No resolved problems to display.       Mr. Bennett is a 74 y.o. male who is s/p NAMAN PER ANESTHESIA, STERNOTOMY, CORONARY ARTERY BYPASS GRAFTS X 3 USING ENDOSCOPICALLY HARVESTED LEFT GREATER SAPHENOUS VEIN, LEFT AND RIGHT IMAs, PRP.    3 Days Post-Op   He seems to be doing well thus far postoperatively.   Cardiothoracic surgery is primary.  Home glipizide was restarted this morning.  Will continue for now.  Holding home metformin.  Blood glucoses trending high.  Increase Lantus to 20 units daily and SSI to 6 units 3 times daily with meals.  Continue SSI.  Anticipate ongoing titration of insulin while needed.  Monitor glucose TID AC. For any BG less than 70 mg/dL, treat per hospital protocol  Creatinine improved to 1.38 today.  Baseline seems to  be around 1.1.  On Lasix currently.  Strict intake and output and monitoring creatinine closely.  Hgb downtrending to 7.9 today.  Expect operative blood loss.  Repeat CBC with morning labs, transfuse for Hgb less than 7.   Most recent BP is 126/56.  Anticipate fluctuations in BP due to blood loss, hypovolemia, anesthesia, narcotics etc.   Defer blood pressure management to primary.  Monitor renal function.  Patient has history of DVT and PE.  Not on AC at home.  Currently on Lovenox for DVT prophylaxis.  DVT prophylaxis per surgery.  Current and home medication list reviewed.    Thank you very much for asking [facility] to be involved in this patient's care. We will follow along with you.      Nalini Arevalo MD  [facility]  05/10/25  15:17 EDT

## 2025-05-10 NOTE — PROGRESS NOTES
" LOS: 3 days   Patient Care Team:  Lavell Dunaway MD as PCP - General (Family Medicine)  Papo Gale MD as Consulting Physician (Hematology and Oncology)  Rachael Reynaga MD as Referring Physician (Hospitalist)    Chief Complaint:   Post-op follow-up, s/p CABG    Subjective  Sitting up in chair. Pain controlled. Feeling better.    Vital Signs  Temp:  [97.4 °F (36.3 °C)-98.7 °F (37.1 °C)] 98.2 °F (36.8 °C)  Heart Rate:  [65-79] 65  Resp:  [16-25] 25  BP: (104-129)/(54-65) 129/65      05/08/25  0600 05/09/25  0450 05/10/25  0531   Weight: 94.9 kg (209 lb 3.5 oz) 96.1 kg (211 lb 12.8 oz) 96.8 kg (213 lb 4.8 oz)     Body mass index is 28.93 kg/m².    Intake/Output Summary (Last 24 hours) at 5/10/2025 1014  Last data filed at 5/10/2025 0949  Gross per 24 hour   Intake 1020 ml   Output 2706 ml   Net -1686 ml     I/O this shift:  In: 240 [P.O.:240]  Out: 500 [Urine:500]    Chest tube drainage last 8 hours: 30    Objective:  Vital signs: (most recent): Blood pressure 129/65, pulse 65, temperature 98.2 °F (36.8 °C), temperature source Oral, resp. rate 25, height 182.9 cm (72\"), weight 96.8 kg (213 lb 4.8 oz), SpO2 98%.                Physical Exam:   General Appearance: awake and alert, no acute distress   Lungs: respirations regular and respirations unlabored   Heart: regular rhythm & normal rate and normal S1, S2   Abdomen: soft or nontender, + bowel sounds    Skin: sternal incision clean, dry, intact; EH site c/d/i   Neuro: alert and oriented, no focal deficits.     Results Review:      WBC WBC   Date Value Ref Range Status   05/10/2025 8.85 3.40 - 10.80 10*3/mm3 Final   05/09/2025 9.26 3.40 - 10.80 10*3/mm3 Final   05/08/2025 9.40 3.40 - 10.80 10*3/mm3 Final   05/07/2025 10.62 3.40 - 10.80 10*3/mm3 Final   05/07/2025 11.09 (H) 3.40 - 10.80 10*3/mm3 Final      HGB Hemoglobin   Date Value Ref Range Status   05/10/2025 7.9 (L) 13.0 - 17.7 g/dL Final   05/09/2025 8.3 (L) 13.0 - 17.7 g/dL Final   05/08/2025 8.3 " (L) 13.0 - 17.7 g/dL Final   05/07/2025 10.1 (L) 13.0 - 17.7 g/dL Final   05/07/2025 8.8 (L) 13.0 - 17.7 g/dL Final   05/07/2025 7.8 (C) 12.0 - 17.0 g/dL Final      HCT Hematocrit   Date Value Ref Range Status   05/10/2025 25.4 (L) 37.5 - 51.0 % Final   05/09/2025 26.3 (L) 37.5 - 51.0 % Final   05/08/2025 26.2 (L) 37.5 - 51.0 % Final   05/07/2025 31.4 (L) 37.5 - 51.0 % Final   05/07/2025 27.8 (L) 37.5 - 51.0 % Final   05/07/2025 23 (L) 38 - 51 % Final      Platelets Platelets   Date Value Ref Range Status   05/10/2025 161 140 - 450 10*3/mm3 Final   05/09/2025 155 140 - 450 10*3/mm3 Final   05/08/2025 170 140 - 450 10*3/mm3 Final   05/07/2025 192 140 - 450 10*3/mm3 Final   05/07/2025 204 140 - 450 10*3/mm3 Final        PT/INR:    Protime   Date Value Ref Range Status   05/08/2025 16.2 (H) 11.7 - 14.2 Seconds Final   05/07/2025 15.9 (H) 11.7 - 14.2 Seconds Final   /  INR   Date Value Ref Range Status   05/08/2025 1.30 (H) 0.90 - 1.10 Final   05/07/2025 1.27 (H) 0.90 - 1.10 Final       Sodium Sodium   Date Value Ref Range Status   05/10/2025 133 (L) 136 - 145 mmol/L Final   05/09/2025 134 (L) 136 - 145 mmol/L Final   05/08/2025 141 136 - 145 mmol/L Final   05/07/2025 140 136 - 145 mmol/L Final   05/07/2025 143 136 - 145 mmol/L Final      Potassium Potassium   Date Value Ref Range Status   05/10/2025 4.0 3.5 - 5.2 mmol/L Final   05/09/2025 4.7 3.5 - 5.2 mmol/L Final   05/08/2025 4.1 3.5 - 5.2 mmol/L Final   05/07/2025 4.3 3.5 - 5.2 mmol/L Final   05/07/2025 4.6 3.5 - 5.2 mmol/L Final      Chloride Chloride   Date Value Ref Range Status   05/10/2025 100 98 - 107 mmol/L Final   05/09/2025 101 98 - 107 mmol/L Final   05/08/2025 108 (H) 98 - 107 mmol/L Final   05/07/2025 109 (H) 98 - 107 mmol/L Final   05/07/2025 110 (H) 98 - 107 mmol/L Final      Bicarbonate CO2   Date Value Ref Range Status   05/10/2025 22.5 22.0 - 29.0 mmol/L Final   05/09/2025 23.0 22.0 - 29.0 mmol/L Final   05/08/2025 21.7 (L) 22.0 - 29.0 mmol/L Final    05/07/2025 22.0 22.0 - 29.0 mmol/L Final   05/07/2025 23.8 22.0 - 29.0 mmol/L Final      BUN BUN   Date Value Ref Range Status   05/10/2025 42 (H) 8 - 23 mg/dL Final   05/09/2025 37 (H) 8 - 23 mg/dL Final   05/08/2025 30 (H) 8 - 23 mg/dL Final   05/07/2025 31 (H) 8 - 23 mg/dL Final   05/07/2025 32 (H) 8 - 23 mg/dL Final      Creatinine Creatinine   Date Value Ref Range Status   05/10/2025 1.38 (H) 0.76 - 1.27 mg/dL Final   05/09/2025 1.60 (H) 0.76 - 1.27 mg/dL Final   05/08/2025 1.35 (H) 0.76 - 1.27 mg/dL Final   05/07/2025 1.38 (H) 0.76 - 1.27 mg/dL Final   05/07/2025 1.42 (H) 0.76 - 1.27 mg/dL Final      Calcium Calcium   Date Value Ref Range Status   05/10/2025 8.4 (L) 8.6 - 10.5 mg/dL Final   05/09/2025 8.6 8.6 - 10.5 mg/dL Final   05/08/2025 8.6 8.6 - 10.5 mg/dL Final   05/07/2025 9.0 8.6 - 10.5 mg/dL Final   05/07/2025 8.9 8.6 - 10.5 mg/dL Final      Magnesium Magnesium   Date Value Ref Range Status   05/08/2025 2.8 (H) 1.6 - 2.4 mg/dL Final   05/07/2025 2.6 (H) 1.6 - 2.4 mg/dL Final   05/07/2025 3.2 (H) 1.6 - 2.4 mg/dL Final        aspirin, 81 mg, Oral, Daily  atorvastatin, 40 mg, Oral, Nightly  enoxaparin sodium, 40 mg, Subcutaneous, Q24H  furosemide, 40 mg, Oral, Daily  gabapentin, 200 mg, Oral, Q12H  glipizide, 10 mg, Oral, QAM AC  guaiFENesin, 1,200 mg, Oral, Q12H  insulin glargine, 20 Units, Subcutaneous, Daily  insulin lispro, 2-9 Units, Subcutaneous, 4x Daily AC & at Bedtime  insulin lispro, 6 Units, Subcutaneous, TID With Meals  Lidocaine, 2 patch, Transdermal, Q24H  metoprolol tartrate, 12.5 mg, Oral, Q12H  mupirocin, 1 Application, Each Nare, BID  pantoprazole, 40 mg, Oral, Q AM  polyethylene glycol, 17 g, Oral, Daily  potassium chloride, 20 mEq, Oral, Daily  senna-docusate sodium, 2 tablet, Oral, Nightly  sertraline, 25 mg, Oral, Daily             CAD (coronary artery disease), native coronary artery    Coronary artery disease of native heart with stable angina pectoris    Abnormal findings on  diagnostic imaging of heart and coronary circulation      Assessment & Plan    Multivessel CAD--- s/p CABG x 3 (LIMA to OM1, DIPIKA to LAD, SVG to RCA) - Dr. Grissom  Type 2 diabetes  Hypertension-- BB as tolerated  Hyperlipidemia-- statin  History of recent DVT  History of pulmonary embolism  Anxiety/depression-- home sertraline  VICTOR MANUEL--- Cr improving, monitor  Postop anemia- expected ABLA, monitor closely        POD # 3  Doing well.  On room air this AM.  Good response to IV lasix. Repeat again this PM.  D/c epicardial wires. Keep ángel drain.  Hgb 7.9 this am. BP stable, monitor, add iron supplement.  Blood glc uncontrolled. Consult [facility].  Mobilize and encourage pulm toilet.   Continue routine care.  Plan home with home health next 1-2 days.    Damon Bedoya PA-C  05/10/25  10:14 EDT

## 2025-05-10 NOTE — PROGRESS NOTES
"    Patient Name: Marysol Bennett  :1950  74 y.o.      Patient Care Team:  Lavell Dunaway MD as PCP - General (Family Medicine)  Papo Gale MD as Consulting Physician (Hematology and Oncology)  Rachael Reynaga MD as Referring Physician (Hospitalist)    Chief Complaint:   S/p CABG    Interval History:   Coughing, mild pain.    Objective   Vital Signs  Temp:  [97.4 °F (36.3 °C)-98.7 °F (37.1 °C)] 98.2 °F (36.8 °C)  Heart Rate:  [65-79] 65  Resp:  [16-25] 25  BP: (104-129)/(54-65) 129/65    Intake/Output Summary (Last 24 hours) at 5/10/2025 0938  Last data filed at 5/10/2025 0749  Gross per 24 hour   Intake 1020 ml   Output 2206 ml   Net -1186 ml     Flowsheet Rows      Flowsheet Row First Filed Value   Admission Height 182.9 cm (72\") Documented at 2025 0533   Admission Weight 93.6 kg (206 lb 6.4 oz) Documented at 2025 0533            GEN: no distress, alert and oriented  HEENT: NACT, EOMI, moist mucous membranes, RIJ line  Lungs: CTAB, no wheezes, rales or rhonchi, + chest tubes  CV: normal rate, regular rhythm, normal S1, S2, no murmurs, +2 radial pulses b/l, no carotid bruit  Abdomen: soft, nontender, nondistended, NABS  Extremities: no edema  Skin: no rash, warm, dry  Heme/Lymph: no bruising  Psych: organized thought, normal behavior and affect    Results Review:    Results from last 7 days   Lab Units 05/10/25  0327   SODIUM mmol/L 133*   POTASSIUM mmol/L 4.0   CHLORIDE mmol/L 100   CO2 mmol/L 22.5   BUN mg/dL 42*   CREATININE mg/dL 1.38*   GLUCOSE mg/dL 195*   CALCIUM mg/dL 8.4*         Results from last 7 days   Lab Units 05/10/25  0327   WBC 10*3/mm3 8.85   HEMOGLOBIN g/dL 7.9*   HEMATOCRIT % 25.4*   PLATELETS 10*3/mm3 161     Results from last 7 days   Lab Units 25  0309 25  1139 25  0829   INR  1.30* 1.27* 1.04   APTT seconds  --  30.9 27.5     Results from last 7 days   Lab Units 25  0309   MAGNESIUM mg/dL 2.8*     Results from last 7 days   Lab " Units 05/05/25  0829   CHOLESTEROL mg/dL 126   TRIGLYCERIDES mg/dL 100   HDL CHOL mg/dL 57   LDL CHOL mg/dL 50               Medication Review:   aspirin, 81 mg, Oral, Daily  atorvastatin, 40 mg, Oral, Nightly  enoxaparin sodium, 40 mg, Subcutaneous, Q24H  furosemide, 40 mg, Oral, Daily  gabapentin, 200 mg, Oral, Q12H  glipizide, 10 mg, Oral, QAM AC  guaiFENesin, 1,200 mg, Oral, Q12H  insulin glargine, 20 Units, Subcutaneous, Daily  insulin lispro, 2-9 Units, Subcutaneous, 4x Daily AC & at Bedtime  insulin lispro, 3 Units, Subcutaneous, TID With Meals  Lidocaine, 2 patch, Transdermal, Q24H  metoprolol tartrate, 12.5 mg, Oral, Q12H  mupirocin, 1 Application, Each Nare, BID  pantoprazole, 40 mg, Oral, Q AM  polyethylene glycol, 17 g, Oral, Daily  potassium chloride, 20 mEq, Oral, Daily  senna-docusate sodium, 2 tablet, Oral, Nightly  sertraline, 25 mg, Oral, Daily              Assessment & Plan   #Multivessel CAD status post CABG  #VICTOR MANUEL  #Anemia  #Hypertension  #Hyperlipidemia  #Diabetes     74 y.o. male, seen by Dr Yoo, with hypertension, hyperlipidemia, diabetes, history of provoked PE, recently found multivessel CAD, superficial vein thrombosis of the right greater saphenous vein who presented for elective CABG.  He underwent this yesterday with Dr. Grissom (CABG x 3, DIPIKA to LAD, LIMA to OM, SVG to RCA.)      He is doing great, hemodynamically stable.      Continue routine postop management.    Jai Fisher MD, FACC, [facility]  05/10/25  09:38 EDT

## 2025-05-11 LAB
ANION GAP SERPL CALCULATED.3IONS-SCNC: 10.9 MMOL/L (ref 5–15)
BH BB BLOOD EXPIRATION DATE: NORMAL
BH BB BLOOD TYPE BARCODE: 5100
BH BB DISPENSE STATUS: NORMAL
BH BB PRODUCT CODE: NORMAL
BH BB UNIT NUMBER: NORMAL
BUN SERPL-MCNC: 35 MG/DL (ref 8–23)
BUN/CREAT SERPL: 29.7 (ref 7–25)
CALCIUM SPEC-SCNC: 8.8 MG/DL (ref 8.6–10.5)
CHLORIDE SERPL-SCNC: 104 MMOL/L (ref 98–107)
CO2 SERPL-SCNC: 23.1 MMOL/L (ref 22–29)
CREAT SERPL-MCNC: 1.18 MG/DL (ref 0.76–1.27)
CROSSMATCH INTERPRETATION: NORMAL
DEPRECATED RDW RBC AUTO: 47.8 FL (ref 37–54)
EGFRCR SERPLBLD CKD-EPI 2021: 64.3 ML/MIN/1.73
ERYTHROCYTE [DISTWIDTH] IN BLOOD BY AUTOMATED COUNT: 15.4 % (ref 12.3–15.4)
GLUCOSE BLDC GLUCOMTR-MCNC: 135 MG/DL (ref 70–130)
GLUCOSE BLDC GLUCOMTR-MCNC: 152 MG/DL (ref 70–130)
GLUCOSE BLDC GLUCOMTR-MCNC: 177 MG/DL (ref 70–130)
GLUCOSE BLDC GLUCOMTR-MCNC: 191 MG/DL (ref 70–130)
GLUCOSE SERPL-MCNC: 104 MG/DL (ref 65–99)
HCT VFR BLD AUTO: 26.6 % (ref 37.5–51)
HGB BLD-MCNC: 8.4 G/DL (ref 13–17.7)
MCH RBC QN AUTO: 26.9 PG (ref 26.6–33)
MCHC RBC AUTO-ENTMCNC: 31.6 G/DL (ref 31.5–35.7)
MCV RBC AUTO: 85.3 FL (ref 79–97)
PLATELET # BLD AUTO: 186 10*3/MM3 (ref 140–450)
PMV BLD AUTO: 9.8 FL (ref 6–12)
POTASSIUM SERPL-SCNC: 3.6 MMOL/L (ref 3.5–5.2)
POTASSIUM SERPL-SCNC: 4.2 MMOL/L (ref 3.5–5.2)
RBC # BLD AUTO: 3.12 10*6/MM3 (ref 4.14–5.8)
SODIUM SERPL-SCNC: 138 MMOL/L (ref 136–145)
UNIT  ABO: NORMAL
UNIT  RH: NORMAL
WBC NRBC COR # BLD AUTO: 8 10*3/MM3 (ref 3.4–10.8)

## 2025-05-11 PROCEDURE — 84132 ASSAY OF SERUM POTASSIUM: CPT | Performed by: THORACIC SURGERY (CARDIOTHORACIC VASCULAR SURGERY)

## 2025-05-11 PROCEDURE — 97530 THERAPEUTIC ACTIVITIES: CPT

## 2025-05-11 PROCEDURE — 25010000002 ENOXAPARIN PER 10 MG: Performed by: PHYSICIAN ASSISTANT

## 2025-05-11 PROCEDURE — 63710000001 INSULIN LISPRO (HUMAN) PER 5 UNITS

## 2025-05-11 PROCEDURE — 94762 N-INVAS EAR/PLS OXIMTRY CONT: CPT

## 2025-05-11 PROCEDURE — 94799 UNLISTED PULMONARY SVC/PX: CPT

## 2025-05-11 PROCEDURE — 63710000001 INSULIN GLARGINE PER 5 UNITS: Performed by: STUDENT IN AN ORGANIZED HEALTH CARE EDUCATION/TRAINING PROGRAM

## 2025-05-11 PROCEDURE — 99232 SBSQ HOSP IP/OBS MODERATE 35: CPT | Performed by: STUDENT IN AN ORGANIZED HEALTH CARE EDUCATION/TRAINING PROGRAM

## 2025-05-11 PROCEDURE — 80048 BASIC METABOLIC PNL TOTAL CA: CPT | Performed by: PHYSICIAN ASSISTANT

## 2025-05-11 PROCEDURE — 82948 REAGENT STRIP/BLOOD GLUCOSE: CPT

## 2025-05-11 PROCEDURE — 63710000001 INSULIN LISPRO (HUMAN) PER 5 UNITS: Performed by: STUDENT IN AN ORGANIZED HEALTH CARE EDUCATION/TRAINING PROGRAM

## 2025-05-11 PROCEDURE — 99024 POSTOP FOLLOW-UP VISIT: CPT | Performed by: PHYSICIAN ASSISTANT

## 2025-05-11 PROCEDURE — 85027 COMPLETE CBC AUTOMATED: CPT | Performed by: PHYSICIAN ASSISTANT

## 2025-05-11 PROCEDURE — 25010000002 FUROSEMIDE PER 20 MG: Performed by: PHYSICIAN ASSISTANT

## 2025-05-11 RX ORDER — FUROSEMIDE 10 MG/ML
40 INJECTION INTRAMUSCULAR; INTRAVENOUS ONCE
Status: COMPLETED | OUTPATIENT
Start: 2025-05-11 | End: 2025-05-11

## 2025-05-11 RX ORDER — POTASSIUM CHLORIDE 1.5 G/1.58G
40 POWDER, FOR SOLUTION ORAL EVERY 4 HOURS
Status: COMPLETED | OUTPATIENT
Start: 2025-05-11 | End: 2025-05-11

## 2025-05-11 RX ORDER — METOPROLOL TARTRATE 25 MG/1
25 TABLET, FILM COATED ORAL EVERY 12 HOURS SCHEDULED
Status: DISCONTINUED | OUTPATIENT
Start: 2025-05-11 | End: 2025-05-12 | Stop reason: HOSPADM

## 2025-05-11 RX ORDER — POTASSIUM CHLORIDE 1500 MG/1
20 TABLET, EXTENDED RELEASE ORAL ONCE
Status: COMPLETED | OUTPATIENT
Start: 2025-05-11 | End: 2025-05-11

## 2025-05-11 RX ADMIN — INSULIN LISPRO 2 UNITS: 100 INJECTION, SOLUTION INTRAVENOUS; SUBCUTANEOUS at 11:44

## 2025-05-11 RX ADMIN — PANTOPRAZOLE SODIUM 40 MG: 40 TABLET, DELAYED RELEASE ORAL at 06:44

## 2025-05-11 RX ADMIN — POTASSIUM CHLORIDE 20 MEQ: 1500 TABLET, EXTENDED RELEASE ORAL at 08:18

## 2025-05-11 RX ADMIN — INSULIN LISPRO 2 UNITS: 100 INJECTION, SOLUTION INTRAVENOUS; SUBCUTANEOUS at 20:10

## 2025-05-11 RX ADMIN — INSULIN LISPRO 6 UNITS: 100 INJECTION, SOLUTION INTRAVENOUS; SUBCUTANEOUS at 07:32

## 2025-05-11 RX ADMIN — GABAPENTIN 200 MG: 100 CAPSULE ORAL at 08:18

## 2025-05-11 RX ADMIN — ASPIRIN 81 MG: 81 TABLET, COATED ORAL at 08:18

## 2025-05-11 RX ADMIN — INSULIN GLARGINE 25 UNITS: 100 INJECTION, SOLUTION SUBCUTANEOUS at 07:32

## 2025-05-11 RX ADMIN — POTASSIUM CHLORIDE 40 MEQ: 1.5 FOR SOLUTION ORAL at 11:13

## 2025-05-11 RX ADMIN — INSULIN LISPRO 6 UNITS: 100 INJECTION, SOLUTION INTRAVENOUS; SUBCUTANEOUS at 11:44

## 2025-05-11 RX ADMIN — GABAPENTIN 200 MG: 100 CAPSULE ORAL at 20:11

## 2025-05-11 RX ADMIN — GUAIFENESIN 1200 MG: 600 TABLET, MULTILAYER, EXTENDED RELEASE ORAL at 08:18

## 2025-05-11 RX ADMIN — FUROSEMIDE 40 MG: 40 TABLET ORAL at 08:18

## 2025-05-11 RX ADMIN — IPRATROPIUM BROMIDE AND ALBUTEROL SULFATE 3 ML: .5; 3 SOLUTION RESPIRATORY (INHALATION) at 12:30

## 2025-05-11 RX ADMIN — SERTRALINE HYDROCHLORIDE 25 MG: 25 TABLET ORAL at 08:18

## 2025-05-11 RX ADMIN — CYCLOBENZAPRINE HYDROCHLORIDE 10 MG: 10 TABLET, FILM COATED ORAL at 22:44

## 2025-05-11 RX ADMIN — FERROUS SULFATE TAB 325 MG (65 MG ELEMENTAL FE) 325 MG: 325 (65 FE) TAB at 07:32

## 2025-05-11 RX ADMIN — MUPIROCIN 1 APPLICATION: 20 OINTMENT TOPICAL at 07:32

## 2025-05-11 RX ADMIN — POTASSIUM CHLORIDE 40 MEQ: 1.5 FOR SOLUTION ORAL at 06:44

## 2025-05-11 RX ADMIN — ENOXAPARIN SODIUM 40 MG: 100 INJECTION SUBCUTANEOUS at 16:14

## 2025-05-11 RX ADMIN — FUROSEMIDE 40 MG: 10 INJECTION, SOLUTION INTRAMUSCULAR; INTRAVENOUS at 17:50

## 2025-05-11 RX ADMIN — INSULIN LISPRO 6 UNITS: 100 INJECTION, SOLUTION INTRAVENOUS; SUBCUTANEOUS at 16:15

## 2025-05-11 RX ADMIN — ALPRAZOLAM 0.25 MG: 0.25 TABLET ORAL at 22:44

## 2025-05-11 RX ADMIN — POTASSIUM CHLORIDE 20 MEQ: 1500 TABLET, EXTENDED RELEASE ORAL at 18:56

## 2025-05-11 RX ADMIN — SENNOSIDES AND DOCUSATE SODIUM 2 TABLET: 50; 8.6 TABLET ORAL at 20:11

## 2025-05-11 RX ADMIN — INSULIN LISPRO 2 UNITS: 100 INJECTION, SOLUTION INTRAVENOUS; SUBCUTANEOUS at 16:15

## 2025-05-11 RX ADMIN — GLIPIZIDE 10 MG: 5 TABLET ORAL at 06:44

## 2025-05-11 RX ADMIN — METOPROLOL TARTRATE 25 MG: 25 TABLET, FILM COATED ORAL at 08:18

## 2025-05-11 RX ADMIN — METOPROLOL TARTRATE 25 MG: 25 TABLET, FILM COATED ORAL at 20:11

## 2025-05-11 RX ADMIN — MUPIROCIN 1 APPLICATION: 20 OINTMENT TOPICAL at 20:10

## 2025-05-11 NOTE — PROGRESS NOTES
" LOS: 4 days   Patient Care Team:  Lavell Dunaway MD as PCP - General (Family Medicine)  Papo Gale MD as Consulting Physician (Hematology and Oncology)  Rachael Reynaga MD as Referring Physician (Hospitalist)    Chief Complaint:   Post-op follow-up, s/p CABG    Subjective  Up in chair. Some SOA with ambulation. Otherwise no complaints.     Vital Signs  Temp:  [98.3 °F (36.8 °C)-99.5 °F (37.5 °C)] 98.6 °F (37 °C)  Heart Rate:  [71-88] 75  Resp:  [18-20] 18  BP: (118-146)/(56-92) 130/82      05/09/25  0450 05/10/25  0531 05/11/25  0655   Weight: 96.1 kg (211 lb 12.8 oz) 96.8 kg (213 lb 4.8 oz) 94.8 kg (209 lb 1.6 oz)     Body mass index is 28.36 kg/m².    Intake/Output Summary (Last 24 hours) at 5/11/2025 1127  Last data filed at 5/11/2025 0824  Gross per 24 hour   Intake 1320 ml   Output 1543 ml   Net -223 ml     I/O this shift:  In: 480 [P.O.:480]  Out: 404 [Urine:400; Chest Tube:4]    Chest tube drainage last 8 hours: 3    Objective:  Vital signs: (most recent): Blood pressure 130/82, pulse 75, temperature 98.6 °F (37 °C), temperature source Oral, resp. rate 18, height 182.9 cm (72\"), weight 94.8 kg (209 lb 1.6 oz), SpO2 90%.                Physical Exam:   General Appearance: awake and alert, no acute distress   Lungs: respirations regular, respirations unlabored, and diminished bases   Heart: regular rhythm & normal rate and normal S1, S2   Abdomen: soft or nontender, + bowel sounds , +BM   Skin: sternal incision clean, dry, intact; EVH site c/d/i   Neuro: alert and oriented, no focal deficits.     Results Review:      WBC WBC   Date Value Ref Range Status   05/11/2025 8.00 3.40 - 10.80 10*3/mm3 Final   05/10/2025 8.85 3.40 - 10.80 10*3/mm3 Final   05/09/2025 9.26 3.40 - 10.80 10*3/mm3 Final      HGB Hemoglobin   Date Value Ref Range Status   05/11/2025 8.4 (L) 13.0 - 17.7 g/dL Final   05/10/2025 7.9 (L) 13.0 - 17.7 g/dL Final   05/09/2025 8.3 (L) 13.0 - 17.7 g/dL Final      HCT Hematocrit   Date " "Value Ref Range Status   05/11/2025 26.6 (L) 37.5 - 51.0 % Final   05/10/2025 25.4 (L) 37.5 - 51.0 % Final   05/09/2025 26.3 (L) 37.5 - 51.0 % Final      Platelets Platelets   Date Value Ref Range Status   05/11/2025 186 140 - 450 10*3/mm3 Final   05/10/2025 161 140 - 450 10*3/mm3 Final   05/09/2025 155 140 - 450 10*3/mm3 Final        PT/INR:  No results found for: \"PROTIME\"/No results found for: \"INR\"    Sodium Sodium   Date Value Ref Range Status   05/11/2025 138 136 - 145 mmol/L Final   05/10/2025 133 (L) 136 - 145 mmol/L Final   05/09/2025 134 (L) 136 - 145 mmol/L Final      Potassium Potassium   Date Value Ref Range Status   05/11/2025 3.6 3.5 - 5.2 mmol/L Final   05/10/2025 4.0 3.5 - 5.2 mmol/L Final   05/09/2025 4.7 3.5 - 5.2 mmol/L Final      Chloride Chloride   Date Value Ref Range Status   05/11/2025 104 98 - 107 mmol/L Final   05/10/2025 100 98 - 107 mmol/L Final   05/09/2025 101 98 - 107 mmol/L Final      Bicarbonate CO2   Date Value Ref Range Status   05/11/2025 23.1 22.0 - 29.0 mmol/L Final   05/10/2025 22.5 22.0 - 29.0 mmol/L Final   05/09/2025 23.0 22.0 - 29.0 mmol/L Final      BUN BUN   Date Value Ref Range Status   05/11/2025 35 (H) 8 - 23 mg/dL Final   05/10/2025 42 (H) 8 - 23 mg/dL Final   05/09/2025 37 (H) 8 - 23 mg/dL Final      Creatinine Creatinine   Date Value Ref Range Status   05/11/2025 1.18 0.76 - 1.27 mg/dL Final   05/10/2025 1.38 (H) 0.76 - 1.27 mg/dL Final   05/09/2025 1.60 (H) 0.76 - 1.27 mg/dL Final      Calcium Calcium   Date Value Ref Range Status   05/11/2025 8.8 8.6 - 10.5 mg/dL Final   05/10/2025 8.4 (L) 8.6 - 10.5 mg/dL Final   05/09/2025 8.6 8.6 - 10.5 mg/dL Final      Magnesium No results found for: \"MG\"     aspirin, 81 mg, Oral, Daily  atorvastatin, 40 mg, Oral, Nightly  enoxaparin sodium, 40 mg, Subcutaneous, Q24H  ferrous sulfate, 325 mg, Oral, Daily With Breakfast  furosemide, 40 mg, Oral, Daily  gabapentin, 200 mg, Oral, Q12H  glipizide, 10 mg, Oral, QAM " AC  guaiFENesin, 1,200 mg, Oral, Q12H  insulin glargine, 25 Units, Subcutaneous, Daily  insulin lispro, 2-9 Units, Subcutaneous, 4x Daily AC & at Bedtime  insulin lispro, 6 Units, Subcutaneous, TID With Meals  Lidocaine, 2 patch, Transdermal, Q24H  metoprolol tartrate, 25 mg, Oral, Q12H  mupirocin, 1 Application, Each Nare, BID  pantoprazole, 40 mg, Oral, Q AM  polyethylene glycol, 17 g, Oral, Daily  potassium chloride, 20 mEq, Oral, Daily  senna-docusate sodium, 2 tablet, Oral, Nightly  sertraline, 25 mg, Oral, Daily             CAD (coronary artery disease), native coronary artery    Coronary artery disease of native heart with stable angina pectoris    Abnormal findings on diagnostic imaging of heart and coronary circulation      Assessment & Plan    Multivessel CAD--- s/p CABG x 3 (LIMA to OM1, DIPIKA to LAD, SVG to RCA) - Dr. Grissom  Type 2 diabetes  Hypertension-- BB as tolerated  Hyperlipidemia-- statin  History of recent DVT  History of pulmonary embolism  Anxiety/depression-- home sertraline  VICTOR MANUEL--- Cr improving, monitor  Postop anemia- expected ABLA, monitor closely        POD # 4  Doing well.  On room air this AM.  Good response to IV lasix. Repeat again this PM.  Increase BB for HR/BP control.  D/c remaining chest tube.  Hgb 8.4 from 7.8, BP stable, monitor, cont iron supplement.  Blood glc better controlled. Hospitalist following.  Mobilize and encourage pulm toilet.   Continue routine care.  Hopefully home with home health tomorrow.    Damon Bedoya PA-C  05/11/25  11:27 EDT

## 2025-05-11 NOTE — PLAN OF CARE
Goal Outcome Evaluation:  Plan of Care Reviewed With: patient        Progress: improving  Outcome Evaluation: Pt seated UIC at start of session and agreeable to work with PT. Performs seated tehr ex prior to ambualtion. Pt using cardiac walker, Ani to ambulate 150' today, VC's for walker navigation and gait mechanics. Pt returns to room and is seated EOB. He continues to benefit from skilled PT.

## 2025-05-11 NOTE — PLAN OF CARE
Patient is POD 5 from a CABG x3.  He continues to have a chest tube to TRESSA drain.  Patient had some intermittent confusion overnight, thinking he was at [facility]

## 2025-05-11 NOTE — PROGRESS NOTES
Name: Marysol Bennett ADMIT: 2025   : 1950  PCP: Lavell Dunaway MD    MRN: [id number] LOS: 4 days   AGE/SEX: 74 y.o. male  ROOM: [facility]/     Subjective   Subjective   No acute events overnight.  Patient sitting up in a chair.  Wife at bedside.  States he is feeling a bit better today than he did yesterday.  Chest soreness as expected.  No shortness of breath.    Objective   Objective     Vital Signs  Temp:  [98.3 °F (36.8 °C)-99.5 °F (37.5 °C)] 98.6 °F (37 °C)  Heart Rate:  [71-88] 75  Resp:  [18-20] 18  BP: (118-146)/(56-92) 130/82  SpO2:  [90 %-100 %] 90 %  on   ;   Device (Oxygen Therapy): room air  Body mass index is 28.36 kg/m².    Physical Exam  General: Alert, no acute distress.  Sitting up in chair at bedside.  Appears more comfortable today  ENT: No conjunctival injection or scleral icterus. Moist mucous membranes.  Neuro: Eyes open and moving in all directions, face symmetric, generalized weakness, no focal deficits.   Lungs: Diminished aeration, occasional crackle, no wheezing, no distress  Heart: RRR, no murmurs.   Abdomen: Soft, non-tender, non-distended. Normal bowel sounds.   Ext: Warm and well-perfused. No edema.   Skin: Midline sternotomy incision    Results Review     I reviewed the patient's new clinical results:  Results from last 7 days   Lab Units 25  0406 05/10/25  0327 05/09/25  0426 05/08/25  0309   WBC 10*3/mm3 8.00 8.85 9.26 9.40   HEMOGLOBIN g/dL 8.4* 7.9* 8.3* 8.3*   PLATELETS 10*3/mm3 186 161 155 170     Results from last 7 days   Lab Units 05/11/25  0406 05/10/25  0327 05/09/25  0426 05/08/25  0309   SODIUM mmol/L 138 133* 134* 141   POTASSIUM mmol/L 3.6 4.0 4.7 4.1   CHLORIDE mmol/L 104 100 101 108*   CO2 mmol/L 23.1 22.5 23.0 21.7*   BUN mg/dL 35* 42* 37* 30*   CREATININE mg/dL 1.18 1.38* 1.60* 1.35*   GLUCOSE mg/dL 104* 195* 276* 154*   EGFR mL/min/1.73 64.3 53.3* 44.7* 54.8*     Results from last 7 days   Lab Units 25  0309 25  1530  05/07/25  1139 05/05/25  0829   ALBUMIN g/dL 4.3 4.3 3.6 4.4   BILIRUBIN mg/dL  --   --   --  0.3   ALK PHOS U/L  --   --   --  56   AST (SGOT) U/L  --   --   --  17   ALT (SGPT) U/L  --   --   --  16     Results from last 7 days   Lab Units 05/11/25  0406 05/10/25  0327 05/09/25  0426 05/08/25  0309 05/07/25  1533 05/07/25  1139 05/05/25  0829   CALCIUM mg/dL 8.8 8.4* 8.6 8.6 9.0 8.9 9.9   ALBUMIN g/dL  --   --   --  4.3 4.3 3.6 4.4   MAGNESIUM mg/dL  --   --   --  2.8* 2.6* 3.2* 2.1   PHOSPHORUS mg/dL  --   --   --  4.5 2.9 3.3  --        Glucose   Date/Time Value Ref Range Status   05/11/2025 0645 135 (H) 70 - 130 mg/dL Final   05/10/2025 1928 269 (H) 70 - 130 mg/dL Final   05/10/2025 1545 210 (H) 70 - 130 mg/dL Final   05/10/2025 1104 268 (H) 70 - 130 mg/dL Final   05/10/2025 0622 237 (H) 70 - 130 mg/dL Final   05/09/2025 2012 299 (H) 70 - 130 mg/dL Final   05/09/2025 1600 258 (H) 70 - 130 mg/dL Final       XR Chest PA & Lateral  Result Date: 5/10/2025   1. Stable chest. 2. Small amount of subsegmental atelectasis at the bases. 3. Trace pleural effusions  This report was finalized on 5/10/2025 11:16 AM by Dr. Jose Manuel Sparrow M.D on Workstation: [id number]        I have personally reviewed all medications:  Scheduled Medications  aspirin, 81 mg, Oral, Daily  atorvastatin, 40 mg, Oral, Nightly  enoxaparin sodium, 40 mg, Subcutaneous, Q24H  ferrous sulfate, 325 mg, Oral, Daily With Breakfast  furosemide, 40 mg, Oral, Daily  gabapentin, 200 mg, Oral, Q12H  glipizide, 10 mg, Oral, QAM AC  guaiFENesin, 1,200 mg, Oral, Q12H  insulin glargine, 25 Units, Subcutaneous, Daily  insulin lispro, 2-9 Units, Subcutaneous, 4x Daily AC & at Bedtime  insulin lispro, 6 Units, Subcutaneous, TID With Meals  Lidocaine, 2 patch, Transdermal, Q24H  metoprolol tartrate, 25 mg, Oral, Q12H  mupirocin, 1 Application, Each Nare, BID  pantoprazole, 40 mg, Oral, Q AM  polyethylene glycol, 17 g, Oral, Daily  potassium chloride, 20 mEq, Oral,  Daily  potassium chloride, 40 mEq, Oral, Q4H  senna-docusate sodium, 2 tablet, Oral, Nightly  sertraline, 25 mg, Oral, Daily    Infusions   Diet  Diet: Cardiac, Diabetic; Healthy Heart (2-3 Na+); Consistent Carbohydrate; Fluid Consistency: Thin (IDDSI 0)      Intake/Output Summary (Last 24 hours) at 5/11/2025 1113  Last data filed at 5/11/2025 0824  Gross per 24 hour   Intake 1320 ml   Output 1543 ml   Net -223 ml       Assessment/Plan     Active Hospital Problems    Diagnosis  POA    **CAD (coronary artery disease), native coronary artery [I25.10]  Yes    Coronary artery disease of native heart with stable angina pectoris [I25.118]  Unknown    Abnormal findings on diagnostic imaging of heart and coronary circulation [R93.1]  Unknown      Resolved Hospital Problems   No resolved problems to display.       74 y.o. male with CAD (coronary artery disease), native coronary artery.    3 Days Post-Op. He seems to be doing well thus far postoperatively.   Cardiothoracic surgery/cardiology is primary.  Home glipizide was restarted yesterday morning.  Will continue for now.  Holding home metformin.  Blood glucose trend much improved.  Continue Lantus 25 units daily and lispro 6 units 3 times daily with meals.  Continue SSI.  Ongoing titration of insulin based on requirements.  Monitor glucose TID AC. For any BG less than 70 mg/dL, treat per hospital protocol  Creatinine has normalized today.  Baseline seems to be around 1.1.  On Lasix currently.  Strict intake and output and monitoring creatinine closely.  Hgb improved 8.4 today.  Expect operative blood loss.  Repeat CBC with morning labs, transfuse for Hgb less than 7.   Defer blood pressure management to primary.  Monitor renal function with daily BMP.  Patient has history of DVT and PE.  Not on AC at home.  Currently on Lovenox for DVT prophylaxis.  DVT prophylaxis per surgery.  Current and home medication list reviewed.      Nalini Arevalo MD  [facility]  05/11/25  11:13 EDT

## 2025-05-11 NOTE — THERAPY TREATMENT NOTE
Patient Name: Marysol Bennett  : 1950    MRN: [id number]                              Today's Date: 2025       Admit Date: 2025    Visit Dx:     ICD-10-CM ICD-9-CM   1. S/P CABG (coronary artery bypass graft)  Z95.1 V45.81   2. Coronary artery disease of native artery of native heart with stable angina pectoris  I25.118 414.01     413.9   3. Coronary artery disease of native heart with stable angina pectoris, unspecified vessel or lesion type  I25.118 414.01     413.9   4. Abnormal findings on diagnostic imaging of heart and coronary circulation  R93.1 794.39     Patient Active Problem List   Diagnosis    Essential hypertension    Mixed hyperlipidemia    Diabetes mellitus    Arthritis    Diverticulosis    Gastroesophageal reflux disease without esophagitis    Medicare annual wellness visit, subsequent    Colon cancer screening    Chronic fatigue    Erectile dysfunction due to diseases classified elsewhere    Memory loss    Other acute pulmonary embolism with acute cor pulmonale    Elevated testosterone level in male    Anxiety    Chronic cough    Vertigo    Flank pain    Syncope with risk for coronary artery disease greater than 20% in next 10 years    Coronary artery disease of native heart with stable angina pectoris    Abnormal findings on diagnostic imaging of heart and coronary circulation    CAD (coronary artery disease)    Thrombophlebitis of superficial veins of right lower extremity    CAD (coronary artery disease), native coronary artery     Past Medical History:   Diagnosis Date    Arthritis     Coronary artery disease     Diverticulitis     DVT (deep venous thrombosis)     LEFT LEG, LOG ROLLED ONTO ANKLE AND CAUSED BLOOD CLOTS    GERD (gastroesophageal reflux disease)     Hyperlipidemia     Hypertension     Pulmonary embolism     RIGHT    SOB (shortness of breath)     Type 2 diabetes mellitus     Vertigo      Past Surgical History:   Procedure Laterality Date    APPENDECTOMY   1977    CARDIAC CATHETERIZATION N/A 3/24/2025    Procedure: Coronary angiography;  Surgeon: Jai Black MD;  Location: [facility] CATH INVASIVE LOCATION;  Service: Cardiovascular;  Laterality: N/A;    CARDIAC CATHETERIZATION N/A 3/24/2025    Procedure: Left heart cath;  Surgeon: Jai Black MD;  Location: [facility] CATH INVASIVE LOCATION;  Service: Cardiovascular;  Laterality: N/A;    CARDIAC CATHETERIZATION N/A 3/24/2025    Procedure: Resting Full Cycle Ratio-Procedure not performed;  Surgeon: Jai Black MD;  Location: [facility] CATH INVASIVE LOCATION;  Service: Cardiovascular;  Laterality: N/A;    COLONOSCOPY      COLONOSCOPY N/A 08/11/2023    Procedure: COLONOSCOPY INTO CECUM;  Surgeon: Jefferson Rouse Jr., MD;  Location: [facility] ENDOSCOPY;  Service: General;  Laterality: N/A;  PRE:  SCREENING /    POST: DIVERTICULOSIS, HEMORRHOIDS    CORONARY ARTERY BYPASS GRAFT N/A 5/7/2025    Procedure: NAMAN PER ANESTHESIA, STERNOTOMY, CORONARY ARTERY BYPASS GRAFTS X 3 USING ENDOSCOPICALLY HARVESTED LEFT GREATER SAPHENOUS VEIN, LEFT AND RIGHT IMAs, PRP;  Surgeon: Jr Kg Grissom MD;  Location: [facility];  Service: Cardiothoracic;  Laterality: N/A;    HAND SURGERY Left     KNEE ARTHROSCOPY Right     KNEE CARTILAGE SURGERY Left     ROTATOR CUFF REPAIR Left 2005    TOTAL HIP ARTHROPLASTY Right 2005    TOTAL SHOULDER REVERSE ARTHROPLASTY Left 10/2020      General Information       Row Name 05/11/25 1134          Physical Therapy Time and Intention    Document Type therapy note (daily note)  -DB     Mode of Treatment individual therapy;physical therapy  -DB       Row Name 05/11/25 1134          General Information    Patient Profile Reviewed yes  -DB               User Key  (r) = Recorded By, (t) = Taken By, (c) = Cosigned By      Initials Name Provider Type    DB Brittanie Brooks PT Physical Therapist                   Mobility    No documentation.                  Obj/Interventions       Row Name 05/11/25 1134          Motor Skills     Therapeutic Exercise other (see comments)  LE ther ex x 10  -DB               User Key  (r) = Recorded By, (t) = Taken By, (c) = Cosigned By      Initials Name Provider Type    Brittanie Perera PT Physical Therapist                   Goals/Plan    No documentation.                  Clinical Impression       Row Name 05/11/25 1134          Plan of Care Review    Plan of Care Reviewed With patient  -DB     Progress improving  -DB     Outcome Evaluation Pt seated UIC at start of session and agreeable to work with PT. Performs seated tehr ex prior to ambualtion. Pt using cardiac walker, Ani to ambulate 150' today, VC's for walker navigation and gait mechanics. Pt returns to room and is seated EOB. He continues to benefit from skilled PT.  -DB       Row Name 05/11/25 1134          Therapy Assessment/Plan (PT)    Rehab Potential (PT) good  -DB       Row Name 05/11/25 1134          Vital Signs    Pre Patient Position Sitting  -DB     Intra Patient Position Standing  -DB     Post Patient Position Sitting  -DB       Row Name 05/11/25 1134          Positioning and Restraints    Pre-Treatment Position sitting in chair/recliner  -DB     Post Treatment Position bed  -DB     In Bed sitting EOB;call light within reach;encouraged to call for assist;exit alarm on;notified nsg  -DB               User Key  (r) = Recorded By, (t) = Taken By, (c) = Cosigned By      Initials Name Provider Type    Brittanie Perera PT Physical Therapist                   Outcome Measures       Row Name 05/11/25 1138          How much help from another person do you currently need...    Turning from your back to your side while in flat bed without using bedrails? 4  -DB     Moving from lying on back to sitting on the side of a flat bed without bedrails? 3  -DB     Moving to and from a bed to a chair (including a wheelchair)? 3  -DB     Standing up from a chair using your arms (e.g., wheelchair, bedside chair)? 3  -DB     Climbing 3-5 steps with  a railing? 2  -DB     To walk in hospital room? 3  -DB     AM-PAC 6 Clicks Score (PT) 18  -DB     Highest Level of Mobility Goal 6 --> Walk 10 steps or more  -DB       Row Name 05/11/25 1137          Functional Assessment    Outcome Measure Options AM-PAC 6 Clicks Basic Mobility (PT)  -DB               User Key  (r) = Recorded By, (t) = Taken By, (c) = Cosigned By      Initials Name Provider Type    DB Brittanie Brooks, PT Physical Therapist                                 Physical Therapy Education       Title: PT OT SLP Therapies (Done)       Topic: Physical Therapy (Done)       Point: Mobility training (Done)       Learning Progress Summary            Patient Acceptance, E, VU,NR by DB at 5/11/2025 1137    Acceptance, E, VU by EM at 5/9/2025 1314    Acceptance, E, NR by EM at 5/8/2025 1045                      Point: Home exercise program (Done)       Learning Progress Summary            Patient Acceptance, E, VU,NR by DB at 5/11/2025 1137    Acceptance, E, VU by EM at 5/9/2025 1314    Acceptance, E, NR by EM at 5/8/2025 1045                      Point: Body mechanics (Done)       Learning Progress Summary            Patient Acceptance, E, VU,NR by DB at 5/11/2025 1137                      Point: Precautions (Done)       Learning Progress Summary            Patient Acceptance, E, VU,NR by DB at 5/11/2025 1137                                      User Key       Initials Effective Dates Name Provider Type Discipline    EM 06/16/21 -  Kasia Alvarado PT Physical Therapist PT    DB 06/16/21 -  Brittanie Brooks, PT Physical Therapist PT                  PT Recommendation and Plan     Progress: improving  Outcome Evaluation: Pt seated UIC at start of session and agreeable to work with PT. Performs seated tehr ex prior to ambualtion. Pt using cardiac walker, Ani to ambulate 150' today, VC's for walker navigation and gait mechanics. Pt returns to room and is seated EOB. He continues to benefit from skilled PT.      Time Calculation:         PT Charges       Row Name 05/11/25 1138             Time Calculation    Start Time 1013  -DB      Stop Time 1029  -DB      Time Calculation (min) 16 min  -DB      PT Received On 05/11/25  -DB      PT - Next Appointment 05/12/25  -DB         Time Calculation- PT    Total Timed Code Minutes- PT 16 minute(s)  -DB                User Key  (r) = Recorded By, (t) = Taken By, (c) = Cosigned By      Initials Name Provider Type    DB Brittanie Brooks, PT Physical Therapist                  Therapy Charges for Today       Code Description Service Date Service Provider Modifiers Qty    [id number]  PT THERAPEUTIC ACT EA 15 MIN 5/11/2025 Brittanie Brooks, PT GP 1            PT G-Codes  Outcome Measure Options: AM-PAC 6 Clicks Basic Mobility (PT)  AM-PAC 6 Clicks Score (PT): 18       Brittanie Brooks PT  5/11/2025

## 2025-05-11 NOTE — PROGRESS NOTES
"    Patient Name: Marysol Bennett  :1950  74 y.o.      Patient Care Team:  Lavell Dunaway MD as PCP - General (Family Medicine)  Papo Gale MD as Consulting Physician (Hematology and Oncology)  Rachael Reynaga MD as Referring Physician (Hospitalist)    Chief Complaint:   S/p CABG    Interval History:   Feels okay, was confused yesterday about this.  Likely secondary to delirium.  Currently doing well.    Objective   Vital Signs  Temp:  [98.3 °F (36.8 °C)-99.5 °F (37.5 °C)] 98.6 °F (37 °C)  Heart Rate:  [66-88] 75  Resp:  [18-20] 18  BP: (118-146)/(56-92) 130/82    Intake/Output Summary (Last 24 hours) at 2025 0911  Last data filed at 2025 0824  Gross per 24 hour   Intake 1320 ml   Output 2039 ml   Net -719 ml     Flowsheet Rows      Flowsheet Row First Filed Value   Admission Height 182.9 cm (72\") Documented at 2025 0533   Admission Weight 93.6 kg (206 lb 6.4 oz) Documented at 2025 0533            GEN: no distress, alert and oriented  HEENT: NACT, EOMI, moist mucous membranes, RIJ line  Lungs: CTAB, no wheezes, rales or rhonchi, + chest tubes  CV: normal rate, regular rhythm, normal S1, S2, no murmurs, +2 radial pulses b/l, no carotid bruit  Abdomen: soft, nontender, nondistended, NABS  Extremities: no edema  Skin: no rash, warm, dry  Heme/Lymph: no bruising  Psych: organized thought, normal behavior and affect    Results Review:    Results from last 7 days   Lab Units 25  0406   SODIUM mmol/L 138   POTASSIUM mmol/L 3.6   CHLORIDE mmol/L 104   CO2 mmol/L 23.1   BUN mg/dL 35*   CREATININE mg/dL 1.18   GLUCOSE mg/dL 104*   CALCIUM mg/dL 8.8         Results from last 7 days   Lab Units 25  0406   WBC 10*3/mm3 8.00   HEMOGLOBIN g/dL 8.4*   HEMATOCRIT % 26.6*   PLATELETS 10*3/mm3 186     Results from last 7 days   Lab Units 25  0309 25  1139 25  0829   INR  1.30* 1.27* 1.04   APTT seconds  --  30.9 27.5     Results from last 7 days   Lab Units " 05/08/25  0309   MAGNESIUM mg/dL 2.8*     Results from last 7 days   Lab Units 05/05/25  0829   CHOLESTEROL mg/dL 126   TRIGLYCERIDES mg/dL 100   HDL CHOL mg/dL 57   LDL CHOL mg/dL 50               Medication Review:   aspirin, 81 mg, Oral, Daily  atorvastatin, 40 mg, Oral, Nightly  enoxaparin sodium, 40 mg, Subcutaneous, Q24H  ferrous sulfate, 325 mg, Oral, Daily With Breakfast  furosemide, 40 mg, Oral, Daily  gabapentin, 200 mg, Oral, Q12H  glipizide, 10 mg, Oral, QAM AC  guaiFENesin, 1,200 mg, Oral, Q12H  insulin glargine, 25 Units, Subcutaneous, Daily  insulin lispro, 2-9 Units, Subcutaneous, 4x Daily AC & at Bedtime  insulin lispro, 6 Units, Subcutaneous, TID With Meals  Lidocaine, 2 patch, Transdermal, Q24H  metoprolol tartrate, 25 mg, Oral, Q12H  mupirocin, 1 Application, Each Nare, BID  pantoprazole, 40 mg, Oral, Q AM  polyethylene glycol, 17 g, Oral, Daily  potassium chloride, 20 mEq, Oral, Daily  potassium chloride, 40 mEq, Oral, Q4H  senna-docusate sodium, 2 tablet, Oral, Nightly  sertraline, 25 mg, Oral, Daily              Assessment & Plan   #Multivessel CAD status post CABG  #VICTOR MANUEL  #Anemia  #Hypertension  #Hyperlipidemia  #Diabetes     74 y.o. male, seen by Dr Yoo, with hypertension, hyperlipidemia, diabetes, history of provoked PE, recently found multivessel CAD, superficial vein thrombosis of the right greater saphenous vein who presented for elective CABG.  He underwent this yesterday with Dr. Grissom (CABG x 3, DIPIKA to LAD, LIMA to OM, SVG to RCA.)      He is doing great, hemodynamically stable.      Continue routine postop management.  Possible discharge home tomorrow.    Jai Fisher MD, FACC, [facility]  05/11/25  09:11 EDT

## 2025-05-12 ENCOUNTER — TELEPHONE (OUTPATIENT)
Dept: CARDIOLOGY | Age: 75
End: 2025-05-12

## 2025-05-12 ENCOUNTER — READMISSION MANAGEMENT (OUTPATIENT)
Dept: CALL CENTER | Facility: HOSPITAL | Age: 75
End: 2025-05-12
Payer: MEDICARE

## 2025-05-12 VITALS
DIASTOLIC BLOOD PRESSURE: 70 MMHG | OXYGEN SATURATION: 96 % | WEIGHT: 206.4 LBS | SYSTOLIC BLOOD PRESSURE: 115 MMHG | TEMPERATURE: 98.3 F | HEIGHT: 72 IN | BODY MASS INDEX: 27.96 KG/M2 | HEART RATE: 79 BPM | RESPIRATION RATE: 18 BRPM

## 2025-05-12 LAB
ANION GAP SERPL CALCULATED.3IONS-SCNC: 12.9 MMOL/L (ref 5–15)
BUN SERPL-MCNC: 33 MG/DL (ref 8–23)
BUN/CREAT SERPL: 24.8 (ref 7–25)
CALCIUM SPEC-SCNC: 8.7 MG/DL (ref 8.6–10.5)
CHLORIDE SERPL-SCNC: 102 MMOL/L (ref 98–107)
CO2 SERPL-SCNC: 23.1 MMOL/L (ref 22–29)
CREAT SERPL-MCNC: 1.33 MG/DL (ref 0.76–1.27)
EGFRCR SERPLBLD CKD-EPI 2021: 55.7 ML/MIN/1.73
GLUCOSE BLDC GLUCOMTR-MCNC: 178 MG/DL (ref 70–130)
GLUCOSE BLDC GLUCOMTR-MCNC: 241 MG/DL (ref 70–130)
GLUCOSE SERPL-MCNC: 209 MG/DL (ref 65–99)
POTASSIUM SERPL-SCNC: 4.5 MMOL/L (ref 3.5–5.2)
SODIUM SERPL-SCNC: 138 MMOL/L (ref 136–145)

## 2025-05-12 PROCEDURE — 99232 SBSQ HOSP IP/OBS MODERATE 35: CPT | Performed by: STUDENT IN AN ORGANIZED HEALTH CARE EDUCATION/TRAINING PROGRAM

## 2025-05-12 PROCEDURE — 80048 BASIC METABOLIC PNL TOTAL CA: CPT | Performed by: PHYSICIAN ASSISTANT

## 2025-05-12 PROCEDURE — 63710000001 INSULIN LISPRO (HUMAN) PER 5 UNITS: Performed by: STUDENT IN AN ORGANIZED HEALTH CARE EDUCATION/TRAINING PROGRAM

## 2025-05-12 PROCEDURE — 94799 UNLISTED PULMONARY SVC/PX: CPT

## 2025-05-12 PROCEDURE — 63710000001 INSULIN LISPRO (HUMAN) PER 5 UNITS

## 2025-05-12 PROCEDURE — 82948 REAGENT STRIP/BLOOD GLUCOSE: CPT

## 2025-05-12 PROCEDURE — 63710000001 INSULIN GLARGINE PER 5 UNITS: Performed by: STUDENT IN AN ORGANIZED HEALTH CARE EDUCATION/TRAINING PROGRAM

## 2025-05-12 RX ORDER — POTASSIUM CHLORIDE 1500 MG/1
20 TABLET, EXTENDED RELEASE ORAL DAILY
Qty: 30 TABLET | Refills: 0 | Status: SHIPPED | OUTPATIENT
Start: 2025-05-13 | End: 2025-05-27

## 2025-05-12 RX ORDER — METOPROLOL TARTRATE 25 MG/1
25 TABLET, FILM COATED ORAL EVERY 12 HOURS SCHEDULED
Qty: 180 TABLET | Refills: 0 | Status: SHIPPED | OUTPATIENT
Start: 2025-05-12 | End: 2025-08-10

## 2025-05-12 RX ORDER — ASPIRIN 81 MG/1
81 TABLET ORAL DAILY
Qty: 90 TABLET | Refills: 0 | Status: SHIPPED | OUTPATIENT
Start: 2025-05-12

## 2025-05-12 RX ORDER — HYDROCODONE BITARTRATE AND ACETAMINOPHEN 5; 325 MG/1; MG/1
1 TABLET ORAL EVERY 4 HOURS PRN
Qty: 42 TABLET | Refills: 0 | Status: SHIPPED | OUTPATIENT
Start: 2025-05-12 | End: 2025-05-19

## 2025-05-12 RX ORDER — INSULIN LISPRO 100 [IU]/ML
8 INJECTION, SOLUTION INTRAVENOUS; SUBCUTANEOUS
Start: 2025-05-12

## 2025-05-12 RX ORDER — FERROUS SULFATE 325(65) MG
325 TABLET ORAL
Qty: 30 TABLET | Refills: 0 | Status: SHIPPED | OUTPATIENT
Start: 2025-05-12 | End: 2025-06-11

## 2025-05-12 RX ORDER — FUROSEMIDE 40 MG/1
40 TABLET ORAL DAILY
Qty: 30 TABLET | Refills: 0 | Status: SHIPPED | OUTPATIENT
Start: 2025-05-12 | End: 2025-05-27

## 2025-05-12 RX ORDER — GABAPENTIN 100 MG/1
200 CAPSULE ORAL EVERY 12 HOURS SCHEDULED
Qty: 56 CAPSULE | Refills: 0 | Status: SHIPPED | OUTPATIENT
Start: 2025-05-12 | End: 2025-05-26

## 2025-05-12 RX ORDER — INSULIN LISPRO 100 [IU]/ML
8 INJECTION, SOLUTION INTRAVENOUS; SUBCUTANEOUS
Status: DISCONTINUED | OUTPATIENT
Start: 2025-05-12 | End: 2025-05-12 | Stop reason: HOSPADM

## 2025-05-12 RX ORDER — INSULIN GLARGINE 300 U/ML
30 INJECTION, SOLUTION SUBCUTANEOUS DAILY
Start: 2025-05-12

## 2025-05-12 RX ADMIN — SERTRALINE HYDROCHLORIDE 25 MG: 25 TABLET ORAL at 08:52

## 2025-05-12 RX ADMIN — INSULIN GLARGINE 30 UNITS: 100 INJECTION, SOLUTION SUBCUTANEOUS at 08:52

## 2025-05-12 RX ADMIN — INSULIN LISPRO 8 UNITS: 100 INJECTION, SOLUTION INTRAVENOUS; SUBCUTANEOUS at 08:52

## 2025-05-12 RX ADMIN — INSULIN LISPRO 2 UNITS: 100 INJECTION, SOLUTION INTRAVENOUS; SUBCUTANEOUS at 06:54

## 2025-05-12 RX ADMIN — POTASSIUM CHLORIDE 20 MEQ: 1500 TABLET, EXTENDED RELEASE ORAL at 08:51

## 2025-05-12 RX ADMIN — FUROSEMIDE 40 MG: 40 TABLET ORAL at 08:51

## 2025-05-12 RX ADMIN — GUAIFENESIN 1200 MG: 600 TABLET, MULTILAYER, EXTENDED RELEASE ORAL at 08:52

## 2025-05-12 RX ADMIN — INSULIN LISPRO 4 UNITS: 100 INJECTION, SOLUTION INTRAVENOUS; SUBCUTANEOUS at 11:21

## 2025-05-12 RX ADMIN — METOPROLOL TARTRATE 25 MG: 25 TABLET, FILM COATED ORAL at 08:52

## 2025-05-12 RX ADMIN — GABAPENTIN 200 MG: 100 CAPSULE ORAL at 08:51

## 2025-05-12 RX ADMIN — FERROUS SULFATE TAB 325 MG (65 MG ELEMENTAL FE) 325 MG: 325 (65 FE) TAB at 08:52

## 2025-05-12 RX ADMIN — GLIPIZIDE 10 MG: 5 TABLET ORAL at 06:45

## 2025-05-12 RX ADMIN — INSULIN LISPRO 8 UNITS: 100 INJECTION, SOLUTION INTRAVENOUS; SUBCUTANEOUS at 11:43

## 2025-05-12 RX ADMIN — PANTOPRAZOLE SODIUM 40 MG: 40 TABLET, DELAYED RELEASE ORAL at 06:45

## 2025-05-12 RX ADMIN — IPRATROPIUM BROMIDE AND ALBUTEROL SULFATE 3 ML: .5; 3 SOLUTION RESPIRATORY (INHALATION) at 04:17

## 2025-05-12 RX ADMIN — ASPIRIN 81 MG: 81 TABLET, COATED ORAL at 08:51

## 2025-05-12 RX ADMIN — LIDOCAINE 2 PATCH: 4 PATCH TOPICAL at 08:52

## 2025-05-12 NOTE — PROGRESS NOTES
[facility] Clinical Pharmacy Services: National Cardiology Data Registry (NCDR) Medication Review    Marysol Bennett is s/p CABG x3 for multivessel CAD . Pharmacy to review discharge medications to make sure appropriate medications have been prescribed.    Patient has been discharged on the following:  Aspirin EC 81 mg daily  High Intensity Statin: Atorvastatin 40 mg daily  Beta-blocker: Metoprolol 25 mg twice daily  LVEF=60% (no requirement for ACE/ARB)    Provider stopped home ACE/ARB at discharge due to soft blood pressures in the hospital. Additional needs will be reassessed in the outpatient setting.     These medications meet the requirements for NCDR discharge medication for CABG    Katlin Villalobos, Pharm.D., [facility]   Clinical Pharmacist   Phone Extension #[phone]

## 2025-05-12 NOTE — PROGRESS NOTES
[facility] Clinical Pharmacy Services: Patient Counseling Consult    Marysol Bennett and wife were counseled over the following medications: aspirin, ferrous sulfate, furosemide, gabapentin, Norco, metoprolol, and potassium. Counseling points included the following:    Explained indication of each medication, and patient's need for these medications.  Went over dosing and frequency of each medication (see discharge calendar).  Discussed any special administration, storage, or monitoring instructions with medications (take ferrous sulfate with food, stop home potassium while taking more potent potassium with furosemide, take esomeprazole on an empty stomach).  Discussed all important drug interactions, including over-the-counter medications (ibuprofen, naproxen, Aleve).  Explained possible side effects for each medication (Ferrous sulfate: dull black/green stools, constipation, stomach upset; Gabapentin: dizziness, vivid dreams; Norco: sedation, constipation, Metoprolol: lower heart rate, lower blood pressure, fatigue).  Instructed the patient not to begin or discontinue any medications without informing his physician.  Talked about keeping a 1-2 week journal to bring to his next appointment with the nurse practitioner. Things to be included are blood pressure (taken 2 hours after the metoprolol), heart rate, temperature, and weight.     Patient expressed understanding and had no further questions.      Katlin Villalobos, Pharm.D., [facility]   Clinical Pharmacist  Phone Extension #[phone]

## 2025-05-12 NOTE — PROGRESS NOTES
Start PACC Note    Home Health Referral    Evaluated patient  on Home Care and services available. Patient offered choice of available HHC and agreeable to SN services with [facility].    Isolation Precautions: No active isolations    START PATIENT REGISTRATION INFORMATION  Order Information  Order Signing Physician: Jr Kg Grissom MD  Service Ordered RN?: Yes  Service Ordered PT?: No  Service Ordered OT?: No  Service Ordered ST?: No  Service Ordered MSW?: No  Service Ordered HHA?: No  Following Physician: Dr. Kg Grissom  Following Physician Phone: [phone]  Overseeing Physician: Dr. Kg Grissom  (Required for Residents Only)  Agreeable to Follow ? Yes  Date/Time of Call 05/12/25 11:57 EDT, Spoke with: Orders in Epic per BELKYS Perez/ Dr. Kg Grissom    Care Coordination  Same Day SOC?: No  Primary Care Physician: Lavell Dunaway MD  Primary Care Physician Phone: [phone]  Primary Care Physician Address: [address] [place] [address] / [place] [address]  Visit Instructions: Please call enid Gonzáles for visit scheduling- [phone]  Service Discharge Location Type: Home  Service Facility Name:   Service Floor Facility:   Service Room No:     Demographics  Patient Last Name: Elder  Patient First Name: Marysol  Language/Communication Barrier: none  Service Address: [address] [place] [address]  Service City: [place]  Service State: [place]  Service Zip: [address]  Service Home Phone: [phone]  Other Phone Numbers:   Telephone Information:   Mobile [phone]     Emergency Contact:   Extended Emergency Contact Information  Primary Emergency Contact: PRINCESS WALKER  Home Phone: [phone]  Mobile Phone: [phone]  Relation: Spouse  Secondary Emergency Contact: GAY WALKER  Home Phone: [phone]  Relation: Daughter    Admission Information  Admit Date: 5/7/2025  Patient Status at Discharge: Inpatient  Admitting Diagnosis: Coronary artery disease of native heart with stable angina  pectoris, unspecified vessel or lesion type [I25.118]  Abnormal findings on diagnostic imaging of heart and coronary circulation [R93.1]  CAD (coronary artery disease), native coronary artery [I25.10]    Caregiver Information  Caregiver First Name:   Caregiver Last Name:   Caregiver Relationship to Patient:   Caregiver Phone Number:   Caregiver Notes:     HITECH  [employer]  No      END PATIENT REGISTRATION INFORMATION    Start PACC Summary  Additional Comments:     END PACC Summary    Discharge Date: Pending    Referral Source: [facility]    Signed By: Yasmine Collier RN, 5/12/2025, 11:57 EDT     Date/Time: 05/12/25 11:57 EDT    End PACC Note

## 2025-05-12 NOTE — CONSULTS
"Diabetes Education  Assessment/Teaching    Patient Name:  Marysol Bennett  YOB: 1950  MRN: [id number]  Admit Date:  5/7/2025      Assessment Date:  5/12/2025  Flowsheet Row Most Recent Value   General Information     Referral From: MD zepeda   Height 182.9 cm (72\")   Height Method Actual   Weight 93.6 kg (206 lb 6.4 oz)   Weight Method Standing scale   Diabetes History    What type of diabetes do you have? Type 2   Length of Diabetes Diagnosis 1 - 5 years   Do you test your blood sugar at home? yes   Frequency of checks 2-5x/day   Who performs the test? self   Have you had low blood sugar? (<70mg/dl) yes   Do you have any diabetes complications? circulation problems   Education Preferences    What areas of diabetes would you like to learn about? avoiding high blood sugar, avoiding low blood sugar   Barriers to Learning other (comment)  [memory recall]   Assessment Topics    Taking Medication - Assessment Needs education   Problem Solving - Assessment Needs education   Reducing Risk - Assessment Needs education   Monitoring - Assessment Needs education   DM Goals    Healthy Eating - Goal 0-7 days from discharge   Being Active - Goal 0-7 days from discharge   Taking Medication - Goal 0-7 days from discharge   Problem Solving - Goal 0-7 days from discharge   Reducing Risk - Goal 0-30 days from discharge   Healthy Coping - Goal 0-30 days from discharge   Monitoring - Goal 0-7 days from discharge   Contact Plan Follow-up medical care, 0-30 days from discharge       Flowsheet Row Most Recent Value   DM Education Needs    Meter Has own   Medication Insulin, Actions, Side effects, Administration, Pen   Problem Solving Hypoglycemia, Hyperglycemia, Signs, Symptoms, Treatment   Reducing Risks A1C testing   Healthy Coping Appropriate   Discharge Plan Home, Follow-up with endocrinolgoist   Motivation Moderate   Teaching Method Explanation, Discussion   Patient Response Needs reinforcement, Verbalized " "understanding       Other Comments:  Met with pt at bedside. Wife not at bedside during initial consult. Consulted to reinforce d/c instructions for insulin. Pt was on Toujeo 15u and Humalog prior to admission. Will be going home on an increased dose of Toujeo and Humalog with meals.  When asking pt about low BG, he reports having a lot BG in the mornings of \"I don't know, 11 or 12\". He wears a Rupert CGM and reports BG in the 120s-130s in the morning. When discussing treatment for low BG, pt states he gives insulin.     On second consult, wife present in room. Reviewed insulin d/c instructions. Pt and wife report pt gives all injections and titrates insulin based off BG at home. Reviewed hypoglycemia s/s/tx. Stressed importance of not giving insulin without knowing BG and without eating a meal. Follows up with endocrinology, encouraged to follow up in next couple weeks.           Electronically signed by:  Patrick Camejo RN  05/12/25 11:45 EDT  "

## 2025-05-12 NOTE — CASE MANAGEMENT/SOCIAL WORK
Case Management Discharge Note      Final Note: Pt discharged home with [facility]…....Bre S/RN CM    Provided Post Acute Provider Quality & Resource List?: Yes  Post Acute Provider Quality and Resource List: Home Health  Delivered To: Patient  Method of Delivery: In person    Selected Continued Care - Discharged on 5/12/2025 Admission date: 5/7/2025 - Discharge disposition: Home-Health Care Svc      Destination    No services have been selected for the patient.                Durable Medical Equipment    No services have been selected for the patient.                Dialysis/Infusion    No services have been selected for the patient.                Home Medical Care Coordination complete.      Service Provider Services Address Phone Fax Patient Preferred    [facility] [address], [address], [place] [address] [phone] --              Therapy    No services have been selected for the patient.                Community Resources    No services have been selected for the patient.                Community & DME    No services have been selected for the patient.                    Transportation Services  Private: Car    Final Discharge Disposition Code: 06 - home with home health care

## 2025-05-12 NOTE — DISCHARGE SUMMARY
Date of Admission:   Date of Discharge:  5/12/2025    Discharge Diagnosis:   Multivessel CAD--- s/p CABG x 3 (LIMA to OM1, DIPIKA to LAD, SVG to RCA) - Dr. Grissom  Type 2 diabetes  Hypertension-- BB as tolerated  Hyperlipidemia-- statin  History of recent DVT  History of pulmonary embolism  Anxiety/depression-- home sertraline  VICTOR MANUEL--- Cr improving, monitor  Postop anemia- expected ABLA, monitor closely    Presenting Problem/History of Present Illness  Coronary artery disease of native heart with stable angina pectoris, unspecified vessel or lesion type [I25.118]  Abnormal findings on diagnostic imaging of heart and coronary circulation [R93.1]  CAD (coronary artery disease), native coronary artery [I25.10]     Hospital Course  Patient is a 74 y.o. male presented for cardiac surgery on 5/7 he underwent CABG x 3. Asymptomatic carotid stenosis. STARR's. DIPIKA to LAD. LIMA to OM1. Vein graft to RCA. Temporary cardiopulmonary bypass. Antegrade cold blood cardioplegia. Transesophageal echo anesthesia. Endoscopic vein harvest of the left greater saphenous vein by Dr. Grissom (see op report for full details). Post operatively he did well.  Weaned from ventilator on day of surgery.  Tolerated medication therapy ASA, beta blocker and statin.  Weaned from supplemental oxygen. All lines tubes and wires removed without issue. Met PT goals.  Adequate PO intake. Urinating and defecating without issue.  On post operative day 5 he was deemed ready for discharge home with home health.  Follow up appointments as below.  Patient was provided with appropriate discharge education. He was instructed to call office with any questions or concerns.      Procedures Performed  Procedure(s):  NAMAN PER ANESTHESIA, STERNOTOMY, CORONARY ARTERY BYPASS GRAFTS X 3 USING ENDOSCOPICALLY HARVESTED LEFT GREATER SAPHENOUS VEIN, LEFT AND RIGHT IMAs, PRP       Consults:   Consults       Date and Time Order Name Status Description    5/10/2025  8:09 AM Inpatient  Hospitalist Consult      5/7/2025 11:23 AM Inpatient Cardiology Consult Completed     5/7/2025 11:23 AM Inpatient Intensivist Consult Completed             Pertinent Test Results:    Lab Results   Component Value Date    WBC 8.00 05/11/2025    HGB 8.4 (L) 05/11/2025    HCT 26.6 (L) 05/11/2025    MCV 85.3 05/11/2025     05/11/2025      Lab Results   Component Value Date    GLUCOSE 209 (H) 05/12/2025    CALCIUM 8.7 05/12/2025     05/12/2025    K 4.5 05/12/2025    CO2 23.1 05/12/2025     05/12/2025    BUN 33 (H) 05/12/2025    CREATININE 1.33 (H) 05/12/2025    EGFRIFAFRI >60 10/21/2020    EGFRIFNONA 58 (L) 12/10/2021    BCR 24.8 05/12/2025    ANIONGAP 12.9 05/12/2025     Lab Results   Component Value Date    INR 1.30 (H) 05/08/2025    PROTIME 16.2 (H) 05/08/2025         Condition on Discharge:  good    Vital Signs  Temp:  [98.3 °F (36.8 °C)-99.7 °F (37.6 °C)] 98.3 °F (36.8 °C)  Heart Rate:  [65-79] 79  Resp:  [16-18] 18  BP: (112-129)/(54-91) 115/70      Discharge Disposition  Home-Health Care [facility]    Discharge Medications     Discharge Medications        New Medications        Instructions Start Date   Aspirin Low Dose 81 MG EC tablet  Generic drug: aspirin   81 mg, Oral, Daily      ferrous sulfate 325 (65 FE) MG tablet   325 mg, Oral, Daily With Breakfast      furosemide 40 MG tablet  Commonly known as: LASIX   40 mg, Oral, Daily      gabapentin 100 MG capsule  Commonly known as: NEURONTIN   200 mg, Oral, Every 12 Hours Scheduled      HYDROcodone-acetaminophen 5-325 MG per tablet  Commonly known as: NORCO   1 tablet, Oral, Every 4 Hours PRN      metoprolol tartrate 25 MG tablet  Commonly known as: LOPRESSOR   25 mg, Oral, Every 12 Hours Scheduled      potassium chloride 20 MEQ CR tablet  Commonly known as: KLOR-CON M20   20 mEq, Oral, Daily   Start Date: May 13, 2025            Changes to Medications        Instructions Start Date   HumaLOG KwikPen 100 UNIT/ML solution pen-injector  Generic drug:  Insulin Lispro (1 Unit Dial)  What changed: See the new instructions.   8 Units, Subcutaneous, Daily With Breakfast, Lunch & Dinner      Toujeo SoloStar 300 UNIT/ML solution pen-injector injection  Generic drug: Insulin Glargine (1 Unit Dial)  What changed: See the new instructions.   30 Units, Subcutaneous, Daily             Continue These Medications        Instructions Start Date   atorvastatin 40 MG tablet  Commonly known as: LIPITOR   40 mg, Oral, Daily      BD Pen Needle Meredith 2nd Gen 32G X 4 MM misc  Generic drug: Insulin Pen Needle   1 each, Daily      DHEA PO   1 tablet, Daily      diphenhydrAMINE-acetaminophen  MG tablet per tablet  Commonly known as: TYLENOL PM   1 tablet, Nightly PRN      esomeprazole 40 MG capsule  Commonly known as: nexIUM   40 mg, Oral, Every Morning Before Breakfast      glimepiride 4 MG tablet  Commonly known as: AMARYL   8 mg, 2 Times Daily      metFORMIN 500 MG tablet  Commonly known as: GLUCOPHAGE   1,000 mg, 2 Times Daily      PROBIOTIC PO   1 tablet, Daily      sertraline 25 MG tablet  Commonly known as: ZOLOFT   25 mg, Oral, Daily      Trulicity 0.75 MG/0.5ML solution pen-injector  Generic drug: Dulaglutide   Inject 0.75 mg as directed 1 (One) Time Per Week. SUNDAY. HELD FOR SURGERY. LAST DOSE 4/27/25      TURMERIC PO   1 tablet, Daily      VITAMIN B-12 PO   1 tablet, Daily             Stop These Medications      chlorhexidine 0.12 % solution  Commonly known as: PERIDEX     hydroCHLOROthiazide 12.5 MG tablet     irbesartan 300 MG tablet  Commonly known as: AVAPRO     meloxicam 15 MG tablet  Commonly known as: MOBIC     mupirocin 2 % nasal ointment  Commonly known as: BACTROBAN     Potassium 99 MG tablet              Discharge Diet:     Activity at Discharge:   1. No driving for 2 weeks and off narcotic pain medications.  2. Shower daily. Clean incisions with warm water and antibacterial soap only. Do not put any lotion or ointments on incisions.  3. Ambulate for 10  minutes at least 3 times a day.  4. No heavy lifting > 10lbs until seen in office.   5. Take all medications as prescribed.       Follow-up Appointments  Future Appointments   Date Time Provider Department Center   5/15/2025  9:00 AM Lavell Dunaway MD [facility]   6/9/2025 12:30 PM Lavell Dunaway MD [facility]   6/11/2025  1:00 PM Jr Aakash Grissom MD [facility]   6/13/2025 10:00 AM Jai Black MD [id number]   7/7/2025 10:30 AM Lavell Dunaway MD [facility]     Additional Instructions for the Follow-ups that You Need to Schedule       Ambulatory Referral to Cardiac Rehab   As directed      Ambulatory Referral to Home Health   As directed      Face to Face Visit Date: 5/12/2025   Follow-up provider for Plan of Care?: I will be treating the patient on an ongoing basis.  Please send me the Plan of Care for signature.   Follow-up provider: JR AAKASH GRISSOM [[id number]]   Reason/Clinical Findings: post op cabg   Describe mobility limitations that make leaving home difficult: weakness   Nursing/Therapeutic Services Requested: Skilled Nursing   Skilled nursing orders: Post CABG care   Frequency: 1 Week 1        Call MD With Problems / Concerns   As directed      Instructions:  Call office at [phone] for any drainage, increased redness, or fever over 100.5    Order Comments: Instructions:  Call office at [phone] for any drainage, increased redness, or fever over 100.5         Discharge Follow-up with PCP   As directed       Currently Documented PCP:    Lavell Dunaway MD    PCP Phone Number:    [phone]     Follow Up Details: in 1 week        Discharge Follow-up with Specialty: Cardiologist APRN/PA; 1 Week   As directed      Specialty: Cardiologist APRN/PA   Follow Up: 1 Week   Follow Up Details: bring all prescription bottles to appointment, call for appointment        Discharge Follow-up with Specified Provider: Cardiologist; 1 Month   As directed      To: Cardiologist    Follow Up: 1 Month   Follow Up Details: call for appointment, bring all medication bottles to appointment        Discharge Follow-up with Specified Provider: Dr. Grissom; 1 Month   As directed      To: Dr. Grissom   Follow Up: 1 Month   Follow Up Details: 4-6 weeks, bring all current medications to appointment                Test Results Pending at Discharge       BELKYS Miranda  05/12/25  10:42 EDT

## 2025-05-12 NOTE — OUTREACH NOTE
Prep Survey      Flowsheet Row Responses   [facility] patient discharged from? [facility]   Is LACE score < 7 ? No   Eligibility [facility]   Date of Admission 05/07/25   Date of Discharge 05/12/25   Discharge Disposition Home-Health Care [facility]   Discharge diagnosis NAMAN PER ANESTHESIA, STERNOTOMY, CORONARY ARTERY BYPASS GRAFTS X 3 USING ENDOSCOPICALLY HARVESTED LEFT GREATER SAPHENOUS VEIN, LEFT AND RIGHT IMAs, PRP   Does the patient have one of the following disease processes/diagnoses(primary or secondary)? Cardiothoracic surgery   Does the patient have Home health ordered? Yes   What is the Home health agency?  [facility]   Prep survey completed? Yes            Reyna PERSAUD - Registered Nurse

## 2025-05-12 NOTE — PROGRESS NOTES
Name: Marysol Bennett ADMIT: 2025   : 1950  PCP: Lavell Dunaway MD    MRN: [id number] LOS: 5 days   AGE/SEX: 74 y.o. male  ROOM: [facility]/     Subjective   Subjective   No acute events overnight.  Patient sitting up in a chair.  Blood glucoses remain high and insulin titrated again today.  Further discussion below.  States that he is feeling pretty well this morning.    Objective   Objective     Vital Signs  Temp:  [98.3 °F (36.8 °C)-99.7 °F (37.6 °C)] 98.3 °F (36.8 °C)  Heart Rate:  [65-79] 79  Resp:  [16-18] 18  BP: (112-129)/(54-91) 115/70  SpO2:  [90 %-100 %] 96 %  on  Flow (L/min) (Oxygen Therapy):  [2] 2;   Device (Oxygen Therapy): room air  Body mass index is 27.99 kg/m².    Physical Exam  General: Alert, no acute distress.  Sitting up in chair at bedside.  Comfortable appearing.  ENT: No conjunctival injection or scleral icterus. Moist mucous membranes.  Neuro: Eyes open and moving in all directions, face symmetric, generalized weakness, no focal deficits.   Lungs: Diminished aeration, occasional crackle, no wheezing, no distress  Heart: RRR, no murmurs.   Abdomen: Soft, non-tender, non-distended. Normal bowel sounds.   Ext: Warm and well-perfused. No edema.   Skin: Midline sternotomy incision    Results Review     I reviewed the patient's new clinical results:  Results from last 7 days   Lab Units 25  0406 05/10/25  0327 25  0426 25  0309   WBC 10*3/mm3 8.00 8.85 9.26 9.40   HEMOGLOBIN g/dL 8.4* 7.9* 8.3* 8.3*   PLATELETS 10*3/mm3 186 161 155 170     Results from last 7 days   Lab Units 25  0328 25  1434 25  0406 05/10/25  0327 25  0426   SODIUM mmol/L 138  --  138 133* 134*   POTASSIUM mmol/L 4.5 4.2 3.6 4.0 4.7   CHLORIDE mmol/L 102  --  104 100 101   CO2 mmol/L 23.1  --  23.1 22.5 23.0   BUN mg/dL 33*  --  35* 42* 37*   CREATININE mg/dL 1.33*  --  1.18 1.38* 1.60*   GLUCOSE mg/dL 209*  --  104* 195* 276*   EGFR mL/min/1.73 55.7*  --  64.3 53.3*  44.7*     Results from last 7 days   Lab Units 05/08/25  0309 05/07/25  1533 05/07/25  1139   ALBUMIN g/dL 4.3 4.3 3.6     Results from last 7 days   Lab Units 05/12/25  0328 05/11/25  0406 05/10/25  0327 05/09/25  0426 05/08/25  0309 05/07/25  1533 05/07/25  1139   CALCIUM mg/dL 8.7 8.8 8.4* 8.6 8.6 9.0 8.9   ALBUMIN g/dL  --   --   --   --  4.3 4.3 3.6   MAGNESIUM mg/dL  --   --   --   --  2.8* 2.6* 3.2*   PHOSPHORUS mg/dL  --   --   --   --  4.5 2.9 3.3       Glucose   Date/Time Value Ref Range Status   05/12/2025 0648 178 (H) 70 - 130 mg/dL Final   05/11/2025 1935 191 (H) 70 - 130 mg/dL Final   05/11/2025 1553 177 (H) 70 - 130 mg/dL Final   05/11/2025 1125 152 (H) 70 - 130 mg/dL Final   05/11/2025 0645 135 (H) 70 - 130 mg/dL Final   05/10/2025 1928 269 (H) 70 - 130 mg/dL Final   05/10/2025 1545 210 (H) 70 - 130 mg/dL Final       No radiology results for the last day      I have personally reviewed all medications:  Scheduled Medications  aspirin, 81 mg, Oral, Daily  atorvastatin, 40 mg, Oral, Nightly  enoxaparin sodium, 40 mg, Subcutaneous, Q24H  ferrous sulfate, 325 mg, Oral, Daily With Breakfast  furosemide, 40 mg, Oral, Daily  gabapentin, 200 mg, Oral, Q12H  glipizide, 10 mg, Oral, QAM AC  guaiFENesin, 1,200 mg, Oral, Q12H  insulin glargine, 30 Units, Subcutaneous, Daily  insulin lispro, 2-9 Units, Subcutaneous, 4x Daily AC & at Bedtime  insulin lispro, 8 Units, Subcutaneous, TID With Meals  Lidocaine, 2 patch, Transdermal, Q24H  metoprolol tartrate, 25 mg, Oral, Q12H  pantoprazole, 40 mg, Oral, Q AM  polyethylene glycol, 17 g, Oral, Daily  potassium chloride, 20 mEq, Oral, Daily  senna-docusate sodium, 2 tablet, Oral, Nightly  sertraline, 25 mg, Oral, Daily    Infusions   Diet  Diet: Cardiac, Diabetic; Healthy Heart (2-3 Na+); Consistent Carbohydrate; Fluid Consistency: Thin (IDDSI 0)      Intake/Output Summary (Last 24 hours) at 5/12/2025 1009  Last data filed at 5/12/2025 0710  Gross per 24 hour   Intake  240 ml   Output 3500 ml   Net -3260 ml       Assessment/Plan     Active Hospital Problems    Diagnosis  POA    **CAD (coronary artery disease), native coronary artery [I25.10]  Yes    Coronary artery disease of native heart with stable angina pectoris [I25.118]  Unknown    Abnormal findings on diagnostic imaging of heart and coronary circulation [R93.1]  Unknown      Resolved Hospital Problems   No resolved problems to display.       74 y.o. male with CAD (coronary artery disease), native coronary artery.    Patient has been discharged by primary team.  Discussed with patient and his wife at bedside in detail this morning.  Blood glucoses remain elevated despite increasing doses of Lantus.  I am going to discharge home on 30 units of long-acting insulin daily and 8 units of short acting insulin 3 times daily with meals.  Prior to admission, the patient was taking 15 units of long-acting insulin daily and 4 units of short acting with meals.  We talked about the importance of monitoring his blood glucoses in the coming days.  Likely has been hyperglycemic secondary to stress related to surgery and blood glucoses will likely improve once he is home.  His hemoglobin A1c was 7.7, so he has shown decent control.  Want to have good blood glucose trend to promote wound healing but also want to avoid hypoglycemia.  We talked about goal ranges and titrating insulin based on fasting and Premeal levels.  I have asked the diabetes educator to come by to further discuss this with patient.  He follows with endocrinology in the outpatient setting.  I discussed this with nursing and cardiothoracic surgery APRN.  No objection to discharge from a hospitalist standpoint.      Nalini Arevalo MD  [facility]  05/12/25  10:09 EDT

## 2025-05-12 NOTE — TELEPHONE ENCOUNTER
Caller: Marysol Bennett    Relationship to patient: Self    Best call back number: [phone]    Type of visit: HOSP FU    Additional notes: NURSE FROM HOSPITAL IS CALLING TO SCHEDULE 1 WEEK HOSP FU AND 1 MO HOSP FU. PT IS SCHEDULED FOR 1 MO HOSP FU BUT COULDN'T SCHEDULE 1 WEEK HOSP FU BECAUSE DR. CARLISLE DOESN'T HAVE A PROVIDER TEAM ON HUB TOOL. PLEASE CALL PT TO SCHEDULE 1 WEEK HOSP FU

## 2025-05-12 NOTE — PROGRESS NOTES
"    Patient Name: Marysol Bennett  :1950  74 y.o.      Patient Care Team:  Lavell Dunaway MD as PCP - General (Family Medicine)  Papo Gale MD as Consulting Physician (Hematology and Oncology)  Rachael Reynaga MD as Referring Physician (Hospitalist)    Chief Complaint:   S/p CABG    Interval History:   Feels okay, likely d/c home today.    Objective   Vital Signs  Temp:  [98.3 °F (36.8 °C)-99.7 °F (37.6 °C)] 98.3 °F (36.8 °C)  Heart Rate:  [65-79] 79  Resp:  [16-18] 18  BP: (112-129)/(54-91) 115/70    Intake/Output Summary (Last 24 hours) at 2025 0934  Last data filed at 2025 0710  Gross per 24 hour   Intake 240 ml   Output 3520 ml   Net -3280 ml     Flowsheet Rows      Flowsheet Row First Filed Value   Admission Height 182.9 cm (72\") Documented at 2025 0533   Admission Weight 93.6 kg (206 lb 6.4 oz) Documented at 2025 0533            GEN: no distress, alert and oriented  HEENT: NACT, EOMI, moist mucous membranes, RIJ line  Lungs: CTAB, no wheezes, rales or rhonchi, + chest tubes  CV: normal rate, regular rhythm, normal S1, S2, no murmurs, +2 radial pulses b/l, no carotid bruit  Abdomen: soft, nontender, nondistended, NABS  Extremities: no edema  Skin: no rash, warm, dry  Heme/Lymph: no bruising  Psych: organized thought, normal behavior and affect    Results Review:    Results from last 7 days   Lab Units 25  0328   SODIUM mmol/L 138   POTASSIUM mmol/L 4.5   CHLORIDE mmol/L 102   CO2 mmol/L 23.1   BUN mg/dL 33*   CREATININE mg/dL 1.33*   GLUCOSE mg/dL 209*   CALCIUM mg/dL 8.7         Results from last 7 days   Lab Units 25  0406   WBC 10*3/mm3 8.00   HEMOGLOBIN g/dL 8.4*   HEMATOCRIT % 26.6*   PLATELETS 10*3/mm3 186     Results from last 7 days   Lab Units 25  0309 25  1139   INR  1.30* 1.27*   APTT seconds  --  30.9     Results from last 7 days   Lab Units 25  0309   MAGNESIUM mg/dL 2.8*                     Medication Review:   aspirin, " 81 mg, Oral, Daily  atorvastatin, 40 mg, Oral, Nightly  enoxaparin sodium, 40 mg, Subcutaneous, Q24H  ferrous sulfate, 325 mg, Oral, Daily With Breakfast  furosemide, 40 mg, Oral, Daily  gabapentin, 200 mg, Oral, Q12H  glipizide, 10 mg, Oral, QAM AC  guaiFENesin, 1,200 mg, Oral, Q12H  insulin glargine, 30 Units, Subcutaneous, Daily  insulin lispro, 2-9 Units, Subcutaneous, 4x Daily AC & at Bedtime  insulin lispro, 8 Units, Subcutaneous, TID With Meals  Lidocaine, 2 patch, Transdermal, Q24H  metoprolol tartrate, 25 mg, Oral, Q12H  pantoprazole, 40 mg, Oral, Q AM  polyethylene glycol, 17 g, Oral, Daily  potassium chloride, 20 mEq, Oral, Daily  senna-docusate sodium, 2 tablet, Oral, Nightly  sertraline, 25 mg, Oral, Daily              Assessment & Plan   #Multivessel CAD status post CABG  #VICTOR MANUEL  #Anemia  #Hypertension  #Hyperlipidemia  #Diabetes     74 y.o. male, seen by Dr Yoo, with hypertension, hyperlipidemia, diabetes, history of provoked PE, recently found multivessel CAD, superficial vein thrombosis of the right greater saphenous vein who presented for elective CABG.  He underwent this yesterday with Dr. Grissom (CABG x 3, DIPIKA to LAD, LIMA to OM, SVG to RCA.)      He is doing great, hemodynamically stable.      D/c home today.    Jai Fisher MD, FAC, [facility]  05/12/25  09:34 EDT

## 2025-05-13 ENCOUNTER — TRANSITIONAL CARE MANAGEMENT TELEPHONE ENCOUNTER (OUTPATIENT)
Dept: CALL CENTER | Facility: HOSPITAL | Age: 75
End: 2025-05-13
Payer: MEDICARE

## 2025-05-13 RX ORDER — GUAIFENESIN 600 MG/1
1200 TABLET, EXTENDED RELEASE ORAL 2 TIMES DAILY
Qty: 28 TABLET | Refills: 0 | Status: SHIPPED | OUTPATIENT
Start: 2025-05-13 | End: 2025-05-20

## 2025-05-13 NOTE — OUTREACH NOTE
Call Center TCM Note      Flowsheet Row Responses   [facility] patient discharged from? [place]   Does the patient have one of the following disease processes/diagnoses(primary or secondary)? Cardiothoracic surgery   TCM attempt successful? Yes  [VR listing Nivia and Shandra]   Call start time 1132   Discharge diagnosis NAMAN PER ANESTHESIA, STERNOTOMY, CORONARY ARTERY BYPASS GRAFTS X 3 USING ENDOSCOPICALLY HARVESTED LEFT GREATER SAPHENOUS VEIN, LEFT AND RIGHT IMAs, PRP   Is patient permission given to speak with other caregiver? Yes   List who call center can speak with Nivia   Person spoke with today (if not patient) and relationship Nivia   Meds reviewed with patient/caregiver? Yes   Is the patient having any side effects they believe may be caused by any medication additions or changes? No   Does the patient have all medications related to this admission filled (includes all antibiotics, pain medications, cardiac medications, etc.) Yes   Is the patient taking all medications as directed (includes completed medication regime)? Yes   Comments 5/15/2025  9:00 AM  HOSPITAL FOLLOW UP   Does the patient have an appointment with their PCP within 7-14 days of discharge? Yes   What is the Home health agency?  [facility]   Has home health visited the patient within 72 hours of discharge? Yes   Home health comments PT has seen pt,   Comments Per Nivia the pt is somewhat wobbly when ambulating, he will work with  PT. Pt is tolerating po intake WNL, has a good appetite, per wife's report. The pt did not sleep well overnight r/t coughing, pt is using the pillow to his chest when coughing, per Nivia. Patient utilizing IS at home frquently per wife's report. Chest and LE incisions are without issue per Nivia, HH has evaluated today. The patient reports that his pain is well controlled.   Did the patient receive a copy of their discharge instructions? Yes   Nursing interventions Reviewed  instructions with patient   What is the patient's perception of their health status since discharge? Improving   Nursing interventions Nurse provided patient education   Is the patient/caregiver able to teach back normal signs of recovery? Pain or discomfort at incisional site   Nursing interventions Reassured on normal signs of recovery   Is the patient /caregiver able to teach back basic post-op care? Continue use of incentive spirometry at least 1 week post discharge, Hold pillow to support chest when coughing   Is the patient/caregiver able to teach back signs and symptoms of incisional infection? Increased redness, swelling or pain at the incisonal site   Is the patient/caregiver able to teach back steps to recovery at home? Set small, achievable goals for return to baseline health, Rest and rebuild strength, gradually increase activity, Weigh daily   If the patient is a current smoker, are they able to teach back resources for cessation? Not a smoker   Is the patient/caregiver able to teach back the hierarchy of who to call/visit for symptoms/problems? PCP, Specialist, Home health nurse, Urgent Care, ED, 911 Yes   TCM call completed? Yes            Bing OLIVER - Registered Nurse    5/13/2025, 11:45 EDT

## 2025-05-15 ENCOUNTER — OFFICE VISIT (OUTPATIENT)
Dept: INTERNAL MEDICINE | Facility: CLINIC | Age: 75
End: 2025-05-15
Payer: MEDICARE

## 2025-05-15 VITALS
SYSTOLIC BLOOD PRESSURE: 118 MMHG | BODY MASS INDEX: 27.36 KG/M2 | RESPIRATION RATE: 16 BRPM | HEIGHT: 72 IN | WEIGHT: 202 LBS | DIASTOLIC BLOOD PRESSURE: 72 MMHG | OXYGEN SATURATION: 98 % | HEART RATE: 75 BPM | TEMPERATURE: 98 F

## 2025-05-15 DIAGNOSIS — E11.59 TYPE 2 DIABETES MELLITUS WITH OTHER CIRCULATORY COMPLICATION, WITH LONG-TERM CURRENT USE OF INSULIN: ICD-10-CM

## 2025-05-15 DIAGNOSIS — Z79.4 TYPE 2 DIABETES MELLITUS WITH OTHER CIRCULATORY COMPLICATION, WITH LONG-TERM CURRENT USE OF INSULIN: ICD-10-CM

## 2025-05-15 DIAGNOSIS — E78.2 MIXED HYPERLIPIDEMIA: ICD-10-CM

## 2025-05-15 DIAGNOSIS — I25.118 CORONARY ARTERY DISEASE OF NATIVE ARTERY OF NATIVE HEART WITH STABLE ANGINA PECTORIS: Primary | ICD-10-CM

## 2025-05-15 DIAGNOSIS — I10 ESSENTIAL HYPERTENSION: ICD-10-CM

## 2025-05-15 NOTE — PROGRESS NOTES
RM:________     PCP: Lavell Dunaway MD    : 1950  AGE: 74 y.o.  EST PATIENT           Wt Readings from Last 3 Encounters:   25 93.6 kg (206 lb 6.4 oz)   25 92.4 kg (203 lb 9.6 oz)   25 91.2 kg (201 lb)           CP______  SOA_______ DIZZINESS _____ FATIGUE ______  PALPS ______    WT: ____________ BP: __________L __________R HR______    ALLERGIES:Patient has no known allergies.      Social History     Tobacco Use    Smoking status: Never     Passive exposure: Past    Smokeless tobacco: Never   Vaping Use    Vaping status: Never Used   Substance Use Topics    Alcohol use: Yes     Comment: RARE / 12 beers a yr    Drug use: No

## 2025-05-15 NOTE — PROGRESS NOTES
Transitional Care Follow Up Visit  Subjective     Marysol Bennett is a 74 y.o. male who presents for a transitional care management visit.    Within 48 business hours after discharge our office contacted him via telephone to coordinate his care and needs.      I reviewed and discussed the details of that call along with the discharge summary, hospital problems, inpatient lab results, inpatient diagnostic studies, and consultation reports with Marysol.     Current outpatient and discharge medications have been reconciled for the patient.  Reviewed by: Lavell Dunaway MD          5/12/2025     4:30 PM   Date of TCM Phone Call   [facility]   Date of Admission 5/7/2025   Date of Discharge 5/12/2025   Discharge Disposition Home-Health Care [facility]     Risk for Readmission (LACE) Score: 8 (5/12/2025  6:00 AM)      History of Present Illness   Course During Hospital Stay:  Patient is a 74 y.o. male presented for cardiac surgery on 5/7 he underwent CABG x 3. Asymptomatic carotid stenosis. STARR's. DIPIKA to LAD. LIMA to OM1. Vein graft to RCA. Temporary cardiopulmonary bypass. Antegrade cold blood cardioplegia. Transesophageal echo anesthesia. Endoscopic vein harvest of the left greater saphenous vein by Dr. Grissom (see op report for full details). Post operatively he did well.  Weaned from ventilator on day of surgery.  Tolerated medication therapy ASA, beta blocker and statin.  Weaned from supplemental oxygen. All lines tubes and wires removed without issue. Met PT goals.  Adequate PO intake. Urinating and defecating without issue.  On post operative day 5 he was deemed ready for discharge home with home health.   New medications on discharge aspirin 81 mg ferrous sulfate furosemide gabapentin hydrocodone metoprolol potassium  Changes in Humalog to 8 units daily with breakfast lunch and dinner  Toujeo 30 units daily   Continue atorvastatin as omeprazole glimepiride metformin sertraline Trulicity turmeric  "B12  Stop chlorhexidine hydrochlorothiazide irbesartan meloxicam mupirocin potassium  The following portions of the patient's history were reviewed and updated as appropriate: allergies, current medications, past family history, past medical history, past social history, past surgical history, and problem list.    Review of Systems   Constitutional:  Positive for fatigue. Negative for appetite change, chills, diaphoresis and fever.   HENT:  Negative for congestion, facial swelling, nosebleeds, sore throat and trouble swallowing.    Eyes:  Negative for pain and visual disturbance.   Respiratory:  Negative for wheezing.    Cardiovascular:  Negative for chest pain.   Gastrointestinal:  Negative for blood in stool.   Endocrine: Negative.  Negative for polydipsia, polyphagia and polyuria.   Genitourinary:  Negative for hematuria.   Musculoskeletal:  Positive for back pain. Negative for arthralgias and myalgias.   Skin: Negative.    Allergic/Immunologic: Negative.    Neurological:  Positive for weakness. Negative for dizziness, tremors, seizures, syncope, speech difficulty, light-headedness, numbness and headaches.        Foggy headed   Psychiatric/Behavioral:  Negative for confusion, self-injury and suicidal ideas.    All other systems reviewed and are negative.      Objective   /72   Pulse 75   Temp 98 °F (36.7 °C) (Oral)   Resp 16   Ht 182.9 cm (72.01\")   Wt 91.6 kg (202 lb)   SpO2 98%   BMI 27.39 kg/m²   Physical Exam  Vitals and nursing note reviewed.   Constitutional:       General: He is not in acute distress.     Appearance: Normal appearance. He is well-developed and normal weight. He is not ill-appearing or diaphoretic.   HENT:      Head: Normocephalic and atraumatic.      Right Ear: Tympanic membrane, ear canal and external ear normal.      Left Ear: Tympanic membrane, ear canal and external ear normal.      Nose: No congestion.      Mouth/Throat:      Pharynx: No posterior oropharyngeal erythema. "   Eyes:      General: Lids are normal. No scleral icterus.     Extraocular Movements: Extraocular movements intact.      Conjunctiva/sclera: Conjunctivae normal.   Neck:      Thyroid: No thyroid mass or thyromegaly.      Vascular: No carotid bruit or JVD.   Cardiovascular:      Rate and Rhythm: Normal rate and regular rhythm.      Pulses: Normal pulses.           Radial pulses are 2+ on the right side and 2+ on the left side.      Heart sounds: Normal heart sounds. No murmur heard.  Pulmonary:      Effort: Pulmonary effort is normal. No respiratory distress.      Breath sounds: Normal breath sounds.   Chest:          Comments: No evidence of erythema healing wound  Abdominal:      Palpations: Abdomen is soft.      Tenderness: There is no abdominal tenderness. There is no right CVA tenderness or left CVA tenderness.   Musculoskeletal:      Cervical back: Normal range of motion.      Right lower leg: No edema.      Left lower leg: No edema.      Comments: Leg wound harvest site clean dry   Skin:     General: Skin is warm and dry.      Coloration: Skin is not pale.      Findings: No erythema or rash.   Neurological:      General: No focal deficit present.      Mental Status: He is alert and oriented to person, place, and time.      Sensory: No sensory deficit.      Deep Tendon Reflexes: Reflexes are normal and symmetric.   Psychiatric:         Mood and Affect: Mood normal.         Behavior: Behavior normal. Behavior is cooperative.         Thought Content: Thought content normal.         Judgment: Judgment normal.         Assessment & Plan   Diagnoses and all orders for this visit:    1. Coronary artery disease of native artery of native heart with stable angina pectoris (Primary)    2. Mixed hyperlipidemia    3. Essential hypertension    4. Type 2 diabetes mellitus with other circulatory complication, with long-term current use of insulin    Patient gradually continuing to improve some weakness  He is not constipated  using narcotic pain medication at bedtime helps with sleep  Blood sugars trending slightly higher  Blood pressure well-controlled  Some cough with deep breaths  Recommend incentive spirometry 10 breaths every hour while awake  Monitor for fever sweats and chills  Hyperlipidemia continue atorvastatin  Hypertension continue furosemide metoprolol  Has follow-up appointment pending with endocrinology

## 2025-05-19 ENCOUNTER — HOSPITAL ENCOUNTER (OUTPATIENT)
Dept: CT IMAGING | Facility: HOSPITAL | Age: 75
Discharge: HOME | End: 2025-05-19
Payer: MEDICARE

## 2025-05-19 ENCOUNTER — HOSPITAL ENCOUNTER (OUTPATIENT)
Dept: CARDIOLOGY | Facility: HOSPITAL | Age: 75
Discharge: HOME | End: 2025-05-19
Payer: MEDICARE

## 2025-05-19 ENCOUNTER — OFFICE VISIT (OUTPATIENT)
Dept: CARDIOLOGY | Age: 75
End: 2025-05-19
Payer: MEDICARE

## 2025-05-19 VITALS
OXYGEN SATURATION: 93 % | SYSTOLIC BLOOD PRESSURE: 104 MMHG | HEART RATE: 82 BPM | HEIGHT: 72 IN | DIASTOLIC BLOOD PRESSURE: 60 MMHG | BODY MASS INDEX: 26.87 KG/M2 | WEIGHT: 198.4 LBS

## 2025-05-19 DIAGNOSIS — I26.09 OTHER ACUTE PULMONARY EMBOLISM WITH ACUTE COR PULMONALE: ICD-10-CM

## 2025-05-19 DIAGNOSIS — I65.23 BILATERAL CAROTID ARTERY STENOSIS: ICD-10-CM

## 2025-05-19 DIAGNOSIS — R07.81 PLEURITIC CHEST PAIN: Primary | ICD-10-CM

## 2025-05-19 DIAGNOSIS — R07.81 PLEURITIC CHEST PAIN: ICD-10-CM

## 2025-05-19 DIAGNOSIS — I25.10 CORONARY ARTERY DISEASE INVOLVING NATIVE CORONARY ARTERY OF NATIVE HEART WITHOUT ANGINA PECTORIS: Primary | ICD-10-CM

## 2025-05-19 DIAGNOSIS — I10 ESSENTIAL HYPERTENSION: ICD-10-CM

## 2025-05-19 DIAGNOSIS — E78.2 MIXED HYPERLIPIDEMIA: ICD-10-CM

## 2025-05-19 DIAGNOSIS — I25.10 CORONARY ARTERY DISEASE INVOLVING NATIVE CORONARY ARTERY OF NATIVE HEART WITHOUT ANGINA PECTORIS: ICD-10-CM

## 2025-05-19 DIAGNOSIS — R68.89 OTHER GENERAL SYMPTOMS AND SIGNS: ICD-10-CM

## 2025-05-19 PROBLEM — R55: Status: RESOLVED | Noted: 2025-03-20 | Resolved: 2025-05-19

## 2025-05-19 PROBLEM — I25.118 CORONARY ARTERY DISEASE OF NATIVE HEART WITH STABLE ANGINA PECTORIS: Status: RESOLVED | Noted: 2025-03-24 | Resolved: 2025-05-19

## 2025-05-19 PROBLEM — R93.1 ABNORMAL FINDINGS ON DIAGNOSTIC IMAGING OF HEART AND CORONARY CIRCULATION: Status: RESOLVED | Noted: 2025-03-24 | Resolved: 2025-05-19

## 2025-05-19 PROBLEM — Z91.89: Status: RESOLVED | Noted: 2025-03-20 | Resolved: 2025-05-19

## 2025-05-19 PROBLEM — R10.9 FLANK PAIN: Status: RESOLVED | Noted: 2025-01-03 | Resolved: 2025-05-19

## 2025-05-19 LAB
ALBUMIN SERPL-MCNC: 3.8 G/DL (ref 3.5–5.2)
ALBUMIN/GLOB SERPL: 1 G/DL
ALP SERPL-CCNC: 96 U/L (ref 39–117)
ALT SERPL W P-5'-P-CCNC: 31 U/L (ref 1–41)
ANION GAP SERPL CALCULATED.3IONS-SCNC: 13 MMOL/L (ref 5–15)
AORTIC ARCH: 3.2 CM
AORTIC DIMENSIONLESS INDEX: 0.57 (DI)
ASCENDING AORTA: 3 CM
AST SERPL-CCNC: 26 U/L (ref 1–40)
AV MEAN PRESS GRAD SYS DOP V1V2: 3 MMHG
AV VMAX SYS DOP: 109 CM/SEC
BASOPHILS # BLD AUTO: 0.07 10*3/MM3 (ref 0–0.2)
BASOPHILS NFR BLD AUTO: 0.7 % (ref 0–1.5)
BH CV ECHO MEAS - ACS: 2.05 CM
BH CV ECHO MEAS - AO MAX PG: 4.8 MMHG
BH CV ECHO MEAS - AO ROOT DIAM: 3.5 CM
BH CV ECHO MEAS - AO V2 VTI: 20.9 CM
BH CV ECHO MEAS - AVA(I,D): 2.48 CM2
BH CV ECHO MEAS - EDV(CUBED): 108.6 ML
BH CV ECHO MEAS - EDV(MOD-SP2): 103 ML
BH CV ECHO MEAS - EDV(MOD-SP4): 114 ML
BH CV ECHO MEAS - EF(MOD-SP2): 65 %
BH CV ECHO MEAS - EF(MOD-SP4): 51.8 %
BH CV ECHO MEAS - ESV(CUBED): 33.4 ML
BH CV ECHO MEAS - ESV(MOD-SP2): 36 ML
BH CV ECHO MEAS - ESV(MOD-SP4): 55 ML
BH CV ECHO MEAS - FS: 32.5 %
BH CV ECHO MEAS - IVS/LVPW: 1 CM
BH CV ECHO MEAS - IVSD: 1.16 CM
BH CV ECHO MEAS - LAT PEAK E' VEL: 7.2 CM/SEC
BH CV ECHO MEAS - LV DIASTOLIC VOL/BSA (35-75): 53.9 CM2
BH CV ECHO MEAS - LV MASS(C)D: 205.8 GRAMS
BH CV ECHO MEAS - LV MAX PG: 1.7 MMHG
BH CV ECHO MEAS - LV MEAN PG: 1 MMHG
BH CV ECHO MEAS - LV SYSTOLIC VOL/BSA (12-30): 26 CM2
BH CV ECHO MEAS - LV V1 MAX: 65.1 CM/SEC
BH CV ECHO MEAS - LV V1 VTI: 11.9 CM
BH CV ECHO MEAS - LVIDD: 4.8 CM
BH CV ECHO MEAS - LVIDS: 3.2 CM
BH CV ECHO MEAS - LVOT AREA: 4.3 CM2
BH CV ECHO MEAS - LVOT DIAM: 2.35 CM
BH CV ECHO MEAS - LVPWD: 1.15 CM
BH CV ECHO MEAS - MED PEAK E' VEL: 6.6 CM/SEC
BH CV ECHO MEAS - MR MAX PG: 22.3 MMHG
BH CV ECHO MEAS - MR MAX VEL: 236.2 CM/SEC
BH CV ECHO MEAS - MV A DUR: 0.11 SEC
BH CV ECHO MEAS - MV A MAX VEL: 39 CM/SEC
BH CV ECHO MEAS - MV DEC SLOPE: 375.3 CM/SEC2
BH CV ECHO MEAS - MV DEC TIME: 0.19 SEC
BH CV ECHO MEAS - MV E MAX VEL: 53.6 CM/SEC
BH CV ECHO MEAS - MV E/A: 1.37
BH CV ECHO MEAS - MV MAX PG: 1.49 MMHG
BH CV ECHO MEAS - MV MEAN PG: 0.76 MMHG
BH CV ECHO MEAS - MV P1/2T: 47.7 MSEC
BH CV ECHO MEAS - MV V2 VTI: 16.9 CM
BH CV ECHO MEAS - MVA(P1/2T): 4.6 CM2
BH CV ECHO MEAS - MVA(VTI): 3.1 CM2
BH CV ECHO MEAS - PA ACC TIME: 0.12 SEC
BH CV ECHO MEAS - PA V2 MAX: 77.4 CM/SEC
BH CV ECHO MEAS - PULM A REVS DUR: 0.11 SEC
BH CV ECHO MEAS - PULM A REVS VEL: 15.1 CM/SEC
BH CV ECHO MEAS - PULM DIAS VEL: 45.2 CM/SEC
BH CV ECHO MEAS - PULM S/D: 0.85
BH CV ECHO MEAS - PULM SYS VEL: 38.6 CM/SEC
BH CV ECHO MEAS - QP/QS: 0.54
BH CV ECHO MEAS - RAP SYSTOLE: 3 MMHG
BH CV ECHO MEAS - RV MAX PG: 1.11 MMHG
BH CV ECHO MEAS - RV V1 MAX: 52.7 CM/SEC
BH CV ECHO MEAS - RV V1 VTI: 9.2 CM
BH CV ECHO MEAS - RVOT DIAM: 1.97 CM
BH CV ECHO MEAS - RVSP: 15.3 MMHG
BH CV ECHO MEAS - SV(LVOT): 51.8 ML
BH CV ECHO MEAS - SV(MOD-SP2): 67 ML
BH CV ECHO MEAS - SV(MOD-SP4): 59 ML
BH CV ECHO MEAS - SV(RVOT): 28 ML
BH CV ECHO MEAS - SVI(LVOT): 24.5 ML/M2
BH CV ECHO MEAS - SVI(MOD-SP2): 31.7 ML/M2
BH CV ECHO MEAS - SVI(MOD-SP4): 27.9 ML/M2
BH CV ECHO MEAS - TAPSE (>1.6): 1.47 CM
BH CV ECHO MEAS - TR MAX PG: 12.3 MMHG
BH CV ECHO MEAS - TR MAX VEL: 175.1 CM/SEC
BH CV ECHO MEASUREMENTS AVERAGE E/E' RATIO: 7.77
BH CV XLRA - RV BASE: 3 CM
BH CV XLRA - RV LENGTH: 9.1 CM
BH CV XLRA - RV MID: 2.7 CM
BH CV XLRA - TDI S': 6.6 CM/SEC
BILIRUB SERPL-MCNC: 0.4 MG/DL (ref 0–1.2)
BUN SERPL-MCNC: 28 MG/DL (ref 8–23)
BUN/CREAT SERPL: 20 (ref 7–25)
CALCIUM SPEC-SCNC: 9.8 MG/DL (ref 8.6–10.5)
CHLORIDE SERPL-SCNC: 100 MMOL/L (ref 98–107)
CO2 SERPL-SCNC: 25 MMOL/L (ref 22–29)
CREAT SERPL-MCNC: 1.4 MG/DL (ref 0.76–1.27)
CRP SERPL-MCNC: 8.03 MG/DL (ref 0–0.5)
DEPRECATED RDW RBC AUTO: 49.4 FL (ref 37–54)
EGFRCR SERPLBLD CKD-EPI 2021: 52.4 ML/MIN/1.73
EOSINOPHIL # BLD AUTO: 0.4 10*3/MM3 (ref 0–0.4)
EOSINOPHIL NFR BLD AUTO: 4 % (ref 0.3–6.2)
ERYTHROCYTE [DISTWIDTH] IN BLOOD BY AUTOMATED COUNT: 15.6 % (ref 12.3–15.4)
ERYTHROCYTE [SEDIMENTATION RATE] IN BLOOD: 37 MM/HR (ref 0–20)
GLOBULIN UR ELPH-MCNC: 4 GM/DL
GLUCOSE SERPL-MCNC: 187 MG/DL (ref 65–99)
HCT VFR BLD AUTO: 32 % (ref 37.5–51)
HGB BLD-MCNC: 10 G/DL (ref 13–17.7)
IMM GRANULOCYTES # BLD AUTO: 0.03 10*3/MM3 (ref 0–0.05)
IMM GRANULOCYTES NFR BLD AUTO: 0.3 % (ref 0–0.5)
LEFT ATRIUM VOLUME INDEX: 25.7 ML/M2
LV EF BIPLANE MOD: 59.3 %
LYMPHOCYTES # BLD AUTO: 1.22 10*3/MM3 (ref 0.7–3.1)
LYMPHOCYTES NFR BLD AUTO: 12.2 % (ref 19.6–45.3)
MCH RBC QN AUTO: 27.5 PG (ref 26.6–33)
MCHC RBC AUTO-ENTMCNC: 31.3 G/DL (ref 31.5–35.7)
MCV RBC AUTO: 87.9 FL (ref 79–97)
MONOCYTES # BLD AUTO: 0.57 10*3/MM3 (ref 0.1–0.9)
MONOCYTES NFR BLD AUTO: 5.7 % (ref 5–12)
NEUTROPHILS NFR BLD AUTO: 7.75 10*3/MM3 (ref 1.7–7)
NEUTROPHILS NFR BLD AUTO: 77.1 % (ref 42.7–76)
NRBC BLD AUTO-RTO: 0 /100 WBC (ref 0–0.2)
PLATELET # BLD AUTO: 521 10*3/MM3 (ref 140–450)
PMV BLD AUTO: 9.1 FL (ref 6–12)
POTASSIUM SERPL-SCNC: 5.4 MMOL/L (ref 3.5–5.2)
PROT SERPL-MCNC: 7.8 G/DL (ref 6–8.5)
RBC # BLD AUTO: 3.64 10*6/MM3 (ref 4.14–5.8)
SINUS: 3.2 CM
SODIUM SERPL-SCNC: 138 MMOL/L (ref 136–145)
STJ: 2.7 CM
TROPONIN T SERPL HS-MCNC: 44 NG/L
WBC NRBC COR # BLD AUTO: 10.04 10*3/MM3 (ref 3.4–10.8)

## 2025-05-19 PROCEDURE — 25510000001 PERFLUTREN 6.52 MG/ML SUSPENSION 2 ML VIAL: Performed by: INTERNAL MEDICINE

## 2025-05-19 PROCEDURE — 80053 COMPREHEN METABOLIC PANEL: CPT | Performed by: INTERNAL MEDICINE

## 2025-05-19 PROCEDURE — 93306 TTE W/DOPPLER COMPLETE: CPT

## 2025-05-19 PROCEDURE — 86140 C-REACTIVE PROTEIN: CPT | Performed by: INTERNAL MEDICINE

## 2025-05-19 PROCEDURE — 85652 RBC SED RATE AUTOMATED: CPT | Performed by: INTERNAL MEDICINE

## 2025-05-19 PROCEDURE — 84484 ASSAY OF TROPONIN QUANT: CPT | Performed by: INTERNAL MEDICINE

## 2025-05-19 PROCEDURE — 25510000001 IOPAMIDOL PER 1 ML: Performed by: INTERNAL MEDICINE

## 2025-05-19 PROCEDURE — 85025 COMPLETE CBC W/AUTO DIFF WBC: CPT | Performed by: INTERNAL MEDICINE

## 2025-05-19 PROCEDURE — 36415 COLL VENOUS BLD VENIPUNCTURE: CPT

## 2025-05-19 PROCEDURE — 71275 CT ANGIOGRAPHY CHEST: CPT

## 2025-05-19 RX ORDER — IOPAMIDOL 755 MG/ML
100 INJECTION, SOLUTION INTRAVASCULAR
Status: COMPLETED | OUTPATIENT
Start: 2025-05-19 | End: 2025-05-19

## 2025-05-19 RX ADMIN — IOPAMIDOL 100 ML: 755 INJECTION, SOLUTION INTRAVENOUS at 16:28

## 2025-05-19 RX ADMIN — PERFLUTREN 2 ML: 6.52 INJECTION, SUSPENSION INTRAVENOUS at 14:44

## 2025-05-19 NOTE — PROGRESS NOTES
Date of Office Visit: 25  Encounter Provider: Thomas Yoo MD  Place of Service: [facility] CARDIOLOGY  Patient Name: Marysol Bennett  :1950    Chief Complaint   Patient presents with    Coronary Artery Disease   :     HPI:     Mr. Bennett is 74 y.o. and presents today in hospital follow up. I have reviewed prior notes and there are no changes except for any new updates described below. I have also reviewed any information entered into the medical record by the patient or by ancillary staff.     He has T2DM (oral meds and insulin), HTN, and hyperlipidemia. He has a history of provoked pulmonary embolism and no longer takes an anticoagulant. He has no family history of CAD in his first degree relatives.    In 2025, he reported fatigue and jaw pain. A stress echo in our office was markedly abnormal. Coronary angiography revealed multivessel CAD. During his vein scan, he was noted to have chronic superficial venous thrombus; he was treated with a short course of apixaban before undergoing CABG x 3 in May 2025 (LIMA-OM1, DIPIKA-LAD, SVG-RCA). He had normal LVSF. His post-op course was complicated by mild VICTOR MANUEL but was otherwise smooth.    He had been doing well but for several days he has had severe, sharp/stabbing, pleuritic intrascapular pain. It was so bad yesterday after sneezing that it made him tear up. He denies anginal pain. He denies palps, LH, or syncope.     Past Medical History:   Diagnosis Date    Arthritis     Bilateral carotid artery stenosis 2025    Coronary artery disease     Diverticulitis     DVT (deep venous thrombosis)     LEFT LEG, LOG ROLLED ONTO ANKLE AND CAUSED BLOOD CLOTS    GERD (gastroesophageal reflux disease)     Hyperlipidemia     Hypertension     Pulmonary embolism     RIGHT    Type 2 diabetes mellitus     Vertigo        Past Surgical History:   Procedure Laterality Date    APPENDECTOMY      CARDIAC CATHETERIZATION N/A  3/24/2025    Procedure: Coronary angiography;  Surgeon: Jai Black MD;  Location: [facility] CATH INVASIVE LOCATION;  Service: Cardiovascular;  Laterality: N/A;    CARDIAC CATHETERIZATION N/A 3/24/2025    Procedure: Left heart cath;  Surgeon: Jai Black MD;  Location: [facility] CATH INVASIVE LOCATION;  Service: Cardiovascular;  Laterality: N/A;    CARDIAC CATHETERIZATION N/A 3/24/2025    Procedure: Resting Full Cycle Ratio-Procedure not performed;  Surgeon: Jai Black MD;  Location: [facility] CATH INVASIVE LOCATION;  Service: Cardiovascular;  Laterality: N/A;    COLONOSCOPY      COLONOSCOPY N/A 08/11/2023    Procedure: COLONOSCOPY INTO CECUM;  Surgeon: Jefferson Rouse Jr., MD;  Location: [facility] ENDOSCOPY;  Service: General;  Laterality: N/A;  PRE:  SCREENING /    POST: DIVERTICULOSIS, HEMORRHOIDS    CORONARY ARTERY BYPASS GRAFT N/A 5/7/2025    Procedure: NAMAN PER ANESTHESIA, STERNOTOMY, CORONARY ARTERY BYPASS GRAFTS X 3 USING ENDOSCOPICALLY HARVESTED LEFT GREATER SAPHENOUS VEIN, LEFT AND RIGHT IMAs, PRP;  Surgeon: Jr Kg Grissom MD;  Location: [facility] CVOR;  Service: Cardiothoracic;  Laterality: N/A;    HAND SURGERY Left     KNEE ARTHROSCOPY Right     KNEE CARTILAGE SURGERY Left     ROTATOR CUFF REPAIR Left 2005    TOTAL HIP ARTHROPLASTY Right 2005    TOTAL SHOULDER REVERSE ARTHROPLASTY Left 10/2020       Social History     Socioeconomic History    Marital status:      Spouse name: Nivia    Number of children: 2   Tobacco Use    Smoking status: Never     Passive exposure: Past    Smokeless tobacco: Never   Vaping Use    Vaping status: Never Used   Substance and Sexual Activity    Alcohol use: Yes     Comment: RARE / 12 beers a yr    Drug use: No    Sexual activity: Defer       Family History   Problem Relation Age of Onset    Intracerebral hemorrhage Mother     Heart attack Father     Diabetes Father     Diabetes Sister     Diabetes Paternal Grandmother     Malig Hyperthermia Neg Hx        Review of Systems    Musculoskeletal:  Positive for back pain.   Neurological:  Positive for paresthesias.   All other systems reviewed and are negative.      No Known Allergies      Current Outpatient Medications:     aspirin 81 MG EC tablet, Take 1 tablet by mouth Daily., Disp: 90 tablet, Rfl: 0    atorvastatin (LIPITOR) 40 MG tablet, Take 1 tablet by mouth once daily, Disp: 90 tablet, Rfl: 3    BD Pen Needle Meredith 2nd Gen 32G X 4 MM misc, 1 each by Other route Daily., Disp: , Rfl:     Cyanocobalamin (VITAMIN B-12 PO), Take 1 tablet by mouth Daily., Disp: , Rfl:     diphenhydrAMINE-acetaminophen (TYLENOL PM)  MG tablet per tablet, Take 1 tablet by mouth At Night As Needed for Sleep., Disp: , Rfl:     esomeprazole (nexIUM) 40 MG capsule, TAKE 1 CAPSULE BY MOUTH ONCE DAILY IN THE MORNING BEFORE BREAKFAST, Disp: 90 capsule, Rfl: 0    ferrous sulfate 325 (65 FE) MG tablet, Take 1 tablet by mouth Daily With Breakfast for 30 days., Disp: 30 tablet, Rfl: 0    furosemide (LASIX) 40 MG tablet, Take 1 tablet by mouth Daily for 30 days., Disp: 30 tablet, Rfl: 0    gabapentin (NEURONTIN) 100 MG capsule, Take 2 capsules by mouth Every 12 (Twelve) Hours for 14 days., Disp: 56 capsule, Rfl: 0    glimepiride (AMARYL) 4 MG tablet, Take 2 tablets by mouth 2 (Two) Times a Day., Disp: , Rfl:     guaiFENesin (Mucinex) 600 MG 12 hr tablet, Take 2 tablets by mouth 2 (Two) Times a Day for 7 days., Disp: 28 tablet, Rfl: 0    HumaLOG KwikPen 100 UNIT/ML solution pen-injector, Inject 8 Units under the skin into the appropriate area as directed Daily With Breakfast, Lunch & Dinner., Disp: , Rfl:     HYDROcodone-acetaminophen (NORCO) 5-325 MG per tablet, Take 1 tablet by mouth Every 4 (Four) Hours As Needed for Moderate Pain for up to 7 days., Disp: 42 tablet, Rfl: 0    metFORMIN (GLUCOPHAGE) 500 MG tablet, Take 2 tablets by mouth 2 (Two) Times a Day., Disp: , Rfl:     metoprolol tartrate (LOPRESSOR) 25 MG tablet, Take 1 tablet by mouth Every 12 (Twelve)  "Hours for 90 days., Disp: 180 tablet, Rfl: 0    potassium chloride (KLOR-CON M20) 20 MEQ CR tablet, Take 1 tablet by mouth Daily for 30 days., Disp: 30 tablet, Rfl: 0    Prasterone, DHEA, (DHEA PO), Take 1 tablet by mouth Daily., Disp: , Rfl:     Probiotic Product (PROBIOTIC PO), Take 1 tablet by mouth Daily., Disp: , Rfl:     sertraline (ZOLOFT) 25 MG tablet, Take 1 tablet by mouth Daily., Disp: 90 tablet, Rfl: 2    Toujeo SoloStar 300 UNIT/ML solution pen-injector injection, Inject 30 Units under the skin into the appropriate area as directed Daily., Disp: , Rfl:     Trulicity 0.75 MG/0.5ML solution pen-injector, Inject 0.75 mg as directed 1 (One) Time Per Week. SUNDAY. HELD FOR SURGERY. LAST DOSE 4/27/25, Disp: , Rfl:     TURMERIC PO, Take 1 tablet by mouth Daily., Disp: , Rfl:   No current facility-administered medications for this visit.      Objective:     Vitals:    05/19/25 1306   BP: 104/60   BP Location: Left arm   Pulse: 82   SpO2: 93%   Weight: 90 kg (198 lb 6.4 oz)   Height: 182.9 cm (72.01\")     Body mass index is 26.9 kg/m².    Vitals reviewed.   Constitutional:       Appearance: Well-developed and not in distress.   Eyes:      Conjunctiva/sclera: Conjunctivae normal.   HENT:      Head: Normocephalic.      Nose: Nose normal.   Neck:      Thyroid: Thyroid normal.      Vascular: No JVD. JVD normal.      Lymphadenopathy: No cervical adenopathy.   Pulmonary:      Effort: Pulmonary effort is normal.      Breath sounds: Normal breath sounds.   Cardiovascular:      Normal rate. Regular rhythm.      Murmurs: There is no murmur.   Pulses:     Intact distal pulses.   Edema:     Peripheral edema absent.   Abdominal:      Palpations: Abdomen is soft.      Tenderness: There is no abdominal tenderness.   Musculoskeletal: Normal range of motion.      Cervical back: Normal range of motion. Skin:     General: Skin is warm and dry.   Neurological:      General: No focal deficit present.      Mental Status: Alert and " oriented to person, place, and time.      Cranial Nerves: No cranial nerve deficit.   Psychiatric:         Behavior: Behavior normal.         Thought Content: Thought content normal.         Judgment: Judgment normal.         Procedures      Assessment:       Diagnosis Plan   1. Coronary artery disease involving native coronary artery of native heart without angina pectoris        2. Mixed hyperlipidemia        3. Essential hypertension        4. Pleuritic chest pain        5. Bilateral carotid artery stenosis             Plan:       1/2. He is s/p 3V CABG in May 2025. He has normal LVSF. He's on aspirin and atorvastatin.    3/4. His BP is a bit low. He has severe pleuritic plain. With his history of provoked DVT and chronic superficial VT, I am worried that this could be a PE. I got a stat echo and he has normal RV size, systolic function, and normal RVSP. He has normal LVEF/wall motion and no pericardial effusion. I am going to send him down for a stat CTA of the chest (he'll hold metformin for 48 hours after and furosemide for 24 hours after). I gave him samples of apixaban 5mg with instructions to take 10mg tonight and in the AM if there is a PE.    I stopped KCL as his potassium is 5.4.     5. He had >70% carotid disease on the right; he'll need a referral to vascular surgery.    Sincerely,       Thomas Yoo MD

## 2025-05-19 NOTE — NURSING NOTE
Call placed to [facility] to on call  Per Dr. Sparrow's request,To report the results of pt's CTA chest PE protocol.  Will await for Dr. Toro to call this RN back.       1732-Dr. Toro called and spoke to this RN. This RN reported the findings of the CTA chest PE protocol. Pt has a stable chronic right lower lobe PE.  Dr Toro looked in epic while on phone with this RN and read the CTA chest report himself  from Dr. Sparrow.  Dr. Toro stated that pt may go home and to tell pt to start on his Eliquis as Dr. Yoo instructed pt to do . Teaching done with  pt and his spouse on the importance of immediately starting on his eliquis per Dr. Yoo's instructions.  Pt and his spouse voiced understanding.     1736-Pt and his spouse ambulated self out to go home.

## 2025-05-19 NOTE — PATIENT INSTRUCTIONS
*Stop potassium completely  *Do not take metformin until Wednesday  *Do not take furosemide tomorrow   *IF there is a blood clot, then take 2 tablets of Eliquis tonight and 2 again in the AM

## 2025-05-19 NOTE — PROGRESS NOTES
PT being T/F to [facility] for STAT hold and call CTA chest via WC with RN and spouse. PT has 20g IV to RAC.

## 2025-05-27 ENCOUNTER — OFFICE VISIT (OUTPATIENT)
Age: 75
End: 2025-05-27
Payer: MEDICARE

## 2025-05-27 ENCOUNTER — HOSPITAL ENCOUNTER (OUTPATIENT)
Dept: CARDIOLOGY | Facility: HOSPITAL | Age: 75
Discharge: HOME | End: 2025-05-27
Admitting: NURSE PRACTITIONER
Payer: MEDICARE

## 2025-05-27 VITALS
OXYGEN SATURATION: 95 % | HEART RATE: 66 BPM | HEIGHT: 72 IN | DIASTOLIC BLOOD PRESSURE: 70 MMHG | WEIGHT: 195 LBS | BODY MASS INDEX: 26.41 KG/M2 | SYSTOLIC BLOOD PRESSURE: 126 MMHG

## 2025-05-27 DIAGNOSIS — R11.11 VOMITING WITHOUT NAUSEA, UNSPECIFIED VOMITING TYPE: ICD-10-CM

## 2025-05-27 DIAGNOSIS — I10 ESSENTIAL HYPERTENSION: ICD-10-CM

## 2025-05-27 DIAGNOSIS — R11.11 VOMITING WITHOUT NAUSEA, UNSPECIFIED VOMITING TYPE: Primary | ICD-10-CM

## 2025-05-27 LAB
ANION GAP SERPL CALCULATED.3IONS-SCNC: 11 MMOL/L (ref 5–15)
BUN SERPL-MCNC: 34 MG/DL (ref 8–23)
BUN/CREAT SERPL: 21.9 (ref 7–25)
CALCIUM SPEC-SCNC: 10.1 MG/DL (ref 8.6–10.5)
CHLORIDE SERPL-SCNC: 96 MMOL/L (ref 98–107)
CO2 SERPL-SCNC: 24 MMOL/L (ref 22–29)
CREAT SERPL-MCNC: 1.55 MG/DL (ref 0.76–1.27)
EGFRCR SERPLBLD CKD-EPI 2021: 46.4 ML/MIN/1.73
GLUCOSE SERPL-MCNC: 191 MG/DL (ref 65–99)
POTASSIUM SERPL-SCNC: 5.2 MMOL/L (ref 3.5–5.2)
SODIUM SERPL-SCNC: 131 MMOL/L (ref 136–145)

## 2025-05-27 PROCEDURE — 3074F SYST BP LT 130 MM HG: CPT | Performed by: NURSE PRACTITIONER

## 2025-05-27 PROCEDURE — 25010000002 ONDANSETRON PER 1 MG: Performed by: NURSE PRACTITIONER

## 2025-05-27 PROCEDURE — 3078F DIAST BP <80 MM HG: CPT | Performed by: NURSE PRACTITIONER

## 2025-05-27 PROCEDURE — 99214 OFFICE O/P EST MOD 30 MIN: CPT | Performed by: NURSE PRACTITIONER

## 2025-05-27 PROCEDURE — 80048 BASIC METABOLIC PNL TOTAL CA: CPT | Performed by: NURSE PRACTITIONER

## 2025-05-27 PROCEDURE — 96374 THER/PROPH/DIAG INJ IV PUSH: CPT

## 2025-05-27 PROCEDURE — 1160F RVW MEDS BY RX/DR IN RCRD: CPT | Performed by: NURSE PRACTITIONER

## 2025-05-27 PROCEDURE — 1159F MED LIST DOCD IN RCRD: CPT | Performed by: NURSE PRACTITIONER

## 2025-05-27 PROCEDURE — 36415 COLL VENOUS BLD VENIPUNCTURE: CPT

## 2025-05-27 PROCEDURE — 96365 THER/PROPH/DIAG IV INF INIT: CPT

## 2025-05-27 PROCEDURE — 93000 ELECTROCARDIOGRAM COMPLETE: CPT | Performed by: NURSE PRACTITIONER

## 2025-05-27 RX ORDER — SODIUM CHLORIDE 9 MG/ML
500 INJECTION, SOLUTION INTRAVENOUS ONCE
Status: COMPLETED | OUTPATIENT
Start: 2025-05-27 | End: 2025-05-27

## 2025-05-27 RX ORDER — IRBESARTAN 300 MG/1
300 TABLET ORAL DAILY
Qty: 90 TABLET | Refills: 0 | OUTPATIENT
Start: 2025-05-27

## 2025-05-27 RX ORDER — ONDANSETRON 2 MG/ML
4 INJECTION INTRAMUSCULAR; INTRAVENOUS EVERY 6 HOURS PRN
Status: SHIPPED | OUTPATIENT
Start: 2025-05-27

## 2025-05-27 RX ADMIN — ONDANSETRON 4 MG: 2 INJECTION, SOLUTION INTRAMUSCULAR; INTRAVENOUS at 12:08

## 2025-05-27 RX ADMIN — SODIUM CHLORIDE 500 ML/HR: 9 INJECTION, SOLUTION INTRAVENOUS at 13:51

## 2025-05-27 NOTE — PROGRESS NOTES
Date of Office Visit: 2025  Encounter Provider: BELKYS Varela  Place of Service: [facility] CARDIOLOGY  Patient Name: Marysol Bennett  :1950    Chief complaint: Coronary artery disease    HPI: Marysol Bennett is a 74 y.o. male who is a patient of Dr. Yoo and is new to me today.  Has a history of coronary artery disease in 2025 reported fatigue and jaw pain had a stress echo that was markedly abnormal.  Coronary angiography revealed multivessel CAD.  He also had a chronic superficial venous thrombus on venous duplex.  He was treated with apixaban for short course before undergoing coronary bypass surgery.  After that he had a LIMA to the OM1, DIPIKA to the LAD and saphenous vein graft to the right coronary.  He had mild LV dysfunction he had some acute kidney injury.  He also has type 2 diabetes mellitus, hypertension and hyperlipidemia.  He has a history of a provoked PE in the past.  He was last in the office on May 19 he had been doing well except he had some severe sharp stabbing pleuritic intrascapular pain.  It happened after a hard sneeze and brought tears to his eyes.    We did a stat echo with normal LV size and function no pericardial effusion will send him for stat CT of the chest.  He had a stable chronic pulmonary embolism in the right lower lobe no acute PE was identified he had some inflammatory changes and a small amount of ill-defined fluid in the anterior mediastinum.  We decided we thought it was musculoskeletal and we gave him a short course of nonsteroidal anti-inflammatories.  Previous testing and notes have been reviewed by me.   Past Medical History:   Diagnosis Date    Arthritis     Bilateral carotid artery stenosis 2025    Coronary artery disease     Diverticulitis     DVT (deep venous thrombosis)     LEFT LEG, LOG ROLLED ONTO ANKLE AND CAUSED BLOOD CLOTS    GERD (gastroesophageal reflux disease)     Hyperlipidemia      Hypertension     Pulmonary embolism 2023    RIGHT    Type 2 diabetes mellitus     Vertigo        Past Surgical History:   Procedure Laterality Date    APPENDECTOMY  1977    CARDIAC CATHETERIZATION N/A 3/24/2025    Procedure: Coronary angiography;  Surgeon: Jai Black MD;  Location: [facility] CATH INVASIVE LOCATION;  Service: Cardiovascular;  Laterality: N/A;    CARDIAC CATHETERIZATION N/A 3/24/2025    Procedure: Left heart cath;  Surgeon: Jai Black MD;  Location: [facility] CATH INVASIVE LOCATION;  Service: Cardiovascular;  Laterality: N/A;    CARDIAC CATHETERIZATION N/A 3/24/2025    Procedure: Resting Full Cycle Ratio-Procedure not performed;  Surgeon: Jai Black MD;  Location: [facility] CATH INVASIVE LOCATION;  Service: Cardiovascular;  Laterality: N/A;    COLONOSCOPY      COLONOSCOPY N/A 08/11/2023    Procedure: COLONOSCOPY INTO CECUM;  Surgeon: Jefferson Rouse Jr., MD;  Location: [facility] ENDOSCOPY;  Service: General;  Laterality: N/A;  PRE:  SCREENING /    POST: DIVERTICULOSIS, HEMORRHOIDS    CORONARY ARTERY BYPASS GRAFT N/A 5/7/2025    Procedure: NAMAN PER ANESTHESIA, STERNOTOMY, CORONARY ARTERY BYPASS GRAFTS X 3 USING ENDOSCOPICALLY HARVESTED LEFT GREATER SAPHENOUS VEIN, LEFT AND RIGHT IMAs, PRP;  Surgeon: Jr Kg Grissom MD;  Location: [facility] CVOR;  Service: Cardiothoracic;  Laterality: N/A;    HAND SURGERY Left     KNEE ARTHROSCOPY Right     KNEE CARTILAGE SURGERY Left     ROTATOR CUFF REPAIR Left 2005    TOTAL HIP ARTHROPLASTY Right 2005    TOTAL SHOULDER REVERSE ARTHROPLASTY Left 10/2020       Social History     Socioeconomic History    Marital status:      Spouse name: Nivia    Number of children: 2   Tobacco Use    Smoking status: Never     Passive exposure: Past    Smokeless tobacco: Never   Vaping Use    Vaping status: Never Used   Substance and Sexual Activity    Alcohol use: Yes     Comment: RARE / 12 beers a yr    Drug use: No    Sexual activity: Defer       Family History   Problem Relation Age  of Onset    Intracerebral hemorrhage Mother     Heart attack Father     Diabetes Father     Diabetes Sister     Diabetes Paternal Grandmother     Malig Hyperthermia Neg Hx        Review of Systems   Constitutional: Positive for malaise/fatigue. Negative for diaphoresis.   Cardiovascular:  Negative for chest pain, claudication, dyspnea on exertion, irregular heartbeat, leg swelling, near-syncope, orthopnea, palpitations, paroxysmal nocturnal dyspnea and syncope.   Respiratory:  Negative for cough, shortness of breath and sleep disturbances due to breathing.    Musculoskeletal:  Negative for falls.   Gastrointestinal:  Positive for nausea.   Neurological:  Negative for dizziness and weakness.   Psychiatric/Behavioral:  Negative for altered mental status and substance abuse.        No Known Allergies      Current Outpatient Medications:     aspirin 81 MG EC tablet, Take 1 tablet by mouth Daily., Disp: 90 tablet, Rfl: 0    atorvastatin (LIPITOR) 40 MG tablet, Take 1 tablet by mouth once daily, Disp: 90 tablet, Rfl: 3    BD Pen Needle Meredith 2nd Gen 32G X 4 MM misc, 1 each by Other route Daily., Disp: , Rfl:     Cyanocobalamin (VITAMIN B-12 PO), Take 1 tablet by mouth Daily., Disp: , Rfl:     diphenhydrAMINE-acetaminophen (TYLENOL PM)  MG tablet per tablet, Take 1 tablet by mouth At Night As Needed for Sleep., Disp: , Rfl:     esomeprazole (nexIUM) 40 MG capsule, TAKE 1 CAPSULE BY MOUTH ONCE DAILY IN THE MORNING BEFORE BREAKFAST, Disp: 90 capsule, Rfl: 0    ferrous sulfate 325 (65 FE) MG tablet, Take 1 tablet by mouth Daily With Breakfast for 30 days., Disp: 30 tablet, Rfl: 0    furosemide (LASIX) 40 MG tablet, Take 1 tablet by mouth Daily for 30 days., Disp: 30 tablet, Rfl: 0    glimepiride (AMARYL) 4 MG tablet, Take 2 tablets by mouth 2 (Two) Times a Day., Disp: , Rfl:     HumaLOG KwikPen 100 UNIT/ML solution pen-injector, Inject 8 Units under the skin into the appropriate area as directed Daily With Breakfast,  "Lunch & Dinner., Disp: , Rfl:     metFORMIN (GLUCOPHAGE) 500 MG tablet, Take 2 tablets by mouth 2 (Two) Times a Day., Disp: , Rfl:     metoprolol tartrate (LOPRESSOR) 25 MG tablet, Take 1 tablet by mouth Every 12 (Twelve) Hours for 90 days., Disp: 180 tablet, Rfl: 0    potassium chloride (KLOR-CON M20) 20 MEQ CR tablet, Take 1 tablet by mouth Daily for 30 days., Disp: 30 tablet, Rfl: 0    Prasterone, DHEA, (DHEA PO), Take 1 tablet by mouth Daily., Disp: , Rfl:     Probiotic Product (PROBIOTIC PO), Take 1 tablet by mouth Daily., Disp: , Rfl:     sertraline (ZOLOFT) 25 MG tablet, Take 1 tablet by mouth Daily., Disp: 90 tablet, Rfl: 2    Toujeo SoloStar 300 UNIT/ML solution pen-injector injection, Inject 30 Units under the skin into the appropriate area as directed Daily., Disp: , Rfl:     Trulicity 0.75 MG/0.5ML solution pen-injector, Inject 0.75 mg as directed 1 (One) Time Per Week. SUNDAY. HELD FOR SURGERY. LAST DOSE 4/27/25, Disp: , Rfl:     TURMERIC PO, Take 1 tablet by mouth Daily., Disp: , Rfl:     gabapentin (NEURONTIN) 100 MG capsule, Take 2 capsules by mouth Every 12 (Twelve) Hours for 14 days., Disp: 56 capsule, Rfl: 0        Objective:     Vitals:    05/27/25 1110   BP: 126/70   Pulse: 66   SpO2: 95%   Weight: 88.5 kg (195 lb)   Height: 182.9 cm (72.01\")     Body mass index is 26.44 kg/m².    PHYSICAL EXAM:    Constitutional:       General: Not in acute distress.     Appearance: Normal appearance. Well-developed.   Eyes:      Pupils: Pupils are equal, round, and reactive to light.   HENT:      Head: Normocephalic.   Neck:      Vascular: No carotid bruit or JVD.   Pulmonary:      Effort: Pulmonary effort is normal. No tachypnea.      Breath sounds: Normal breath sounds. No wheezing. No rales.   Cardiovascular:      Normal rate. Regular rhythm.      No gallop.    Pulses:     Intact distal pulses.   Edema:     Peripheral edema absent.   Abdominal:      General: Bowel sounds are normal.      Palpations: Abdomen " is soft.      Tenderness: There is no abdominal tenderness.   Musculoskeletal: Normal range of motion.      Cervical back: Normal range of motion and neck supple. No edema. Skin:     General: Skin is warm and dry.   Neurological:      Mental Status: Alert and oriented to person, place, and time.           ECG 12 Lead    Date/Time: 5/27/2025 11:55 AM  Performed by: Crissy Atkinson APRN    Authorized by: Crissy Atkinson APRN  Comparison: compared with previous ECG from 5/7/2025  Similar to previous ECG  Rhythm: sinus rhythm  Rate: normal  T inversion: III, aVF, V6, V4 and V3  QRS axis: normal  Other findings: T wave abnormality    Clinical impression: abnormal EKG        Lipid Panel          1/3/2025    10:34 3/24/2025    19:00 5/5/2025    08:29   Lipid Panel   Total Cholesterol  115  126    Total Cholesterol 170      Triglycerides 79  97  100    HDL Cholesterol 60  54  57    VLDL Cholesterol 15  18  19    LDL Cholesterol  95  43  50    LDL/HDL Ratio  0.77  0.86          Assessment/Plan:      1.  Coronary artery disease status post coronary artery bypass graft surgery-no further chest pain after completion of NSAIDs.  Continue aspirin 81 mg daily atorvastatin 40 mg daily.    2.  Hyperkalemia-potassium was high last week potassium was stopped but they accidentally continued it thinking it was due to his chest pain.  Will recheck today.  He does have some T wave inversions in the inferior lateral leads.  The lateral T wave inversions are new could be potassium related    3.  Chest discomfort likely was musculoskeletal resolved    4.  Nausea will check labs today to make sure is not an acute kidney injury and high potassium.  Will give Zofran for nausea    5.  Dyslipidemia-goal LDL 55 or less continue atorvastatin 40 mg daily    Follow-up in 1 month with Dr. Arellano         Your medication list            Accurate as of May 27, 2025 11:30 AM. If you have any questions, ask your nurse or doctor.                CONTINUE  taking these medications        Instructions Last Dose Given Next Dose Due   Aspirin Low Dose 81 MG EC tablet  Generic drug: aspirin      Take 1 tablet by mouth Daily.       atorvastatin 40 MG tablet  Commonly known as: LIPITOR      Take 1 tablet by mouth once daily       BD Pen Needle Meredith 2nd Gen 32G X 4 MM misc  Generic drug: Insulin Pen Needle      1 each by Other route Daily.       DHEA PO      Take 1 tablet by mouth Daily.       diphenhydrAMINE-acetaminophen  MG tablet per tablet  Commonly known as: TYLENOL PM      Take 1 tablet by mouth At Night As Needed for Sleep.       esomeprazole 40 MG capsule  Commonly known as: nexIUM      TAKE 1 CAPSULE BY MOUTH ONCE DAILY IN THE MORNING BEFORE BREAKFAST       ferrous sulfate 325 (65 FE) MG tablet      Take 1 tablet by mouth Daily With Breakfast for 30 days.       furosemide 40 MG tablet  Commonly known as: LASIX      Take 1 tablet by mouth Daily for 30 days.       gabapentin 100 MG capsule  Commonly known as: NEURONTIN      Take 2 capsules by mouth Every 12 (Twelve) Hours for 14 days.       glimepiride 4 MG tablet  Commonly known as: AMARYL      Take 2 tablets by mouth 2 (Two) Times a Day.       HumaLOG KwikPen 100 UNIT/ML solution pen-injector  Generic drug: Insulin Lispro (1 Unit Dial)      Inject 8 Units under the skin into the appropriate area as directed Daily With Breakfast, Lunch & Dinner.       metFORMIN 500 MG tablet  Commonly known as: GLUCOPHAGE      Take 2 tablets by mouth 2 (Two) Times a Day.       metoprolol tartrate 25 MG tablet  Commonly known as: LOPRESSOR      Take 1 tablet by mouth Every 12 (Twelve) Hours for 90 days.       potassium chloride 20 MEQ CR tablet  Commonly known as: KLOR-CON M20      Take 1 tablet by mouth Daily for 30 days.       PROBIOTIC PO      Take 1 tablet by mouth Daily.       sertraline 25 MG tablet  Commonly known as: ZOLOFT      Take 1 tablet by mouth Daily.       Toujeo SoloStar 300 UNIT/ML solution pen-injector  injection  Generic drug: Insulin Glargine (1 Unit Dial)      Inject 30 Units under the skin into the appropriate area as directed Daily.       Trulicity 0.75 MG/0.5ML solution pen-injector  Generic drug: Dulaglutide      Inject 0.75 mg as directed 1 (One) Time Per Week. SUNDAY. HELD FOR SURGERY. LAST DOSE 4/27/25       TURMERIC PO      Take 1 tablet by mouth Daily.       VITAMIN B-12 PO      Take 1 tablet by mouth Daily.                  As always, it has been a pleasure to participate in your patient's care.      Sincerely,     Crissy RIZVI

## 2025-05-27 NOTE — TELEPHONE ENCOUNTER
Patient was last seen on 5/15/25, This medication was discontinued by Taniya Farrell APRN Cardiothoracic Surgeon.    Please advise if okay to refill.

## 2025-06-05 ENCOUNTER — OFFICE VISIT (OUTPATIENT)
Dept: CARDIAC REHAB | Facility: HOSPITAL | Age: 75
End: 2025-06-05
Payer: MEDICARE

## 2025-06-05 DIAGNOSIS — Z95.1 S/P CABG (CORONARY ARTERY BYPASS GRAFT): Primary | ICD-10-CM

## 2025-06-05 PROCEDURE — 93798 PHYS/QHP OP CAR RHAB W/ECG: CPT

## 2025-06-05 PROCEDURE — 93797 PHYS/QHP OP CAR RHAB WO ECG: CPT

## 2025-06-09 ENCOUNTER — TREATMENT (OUTPATIENT)
Dept: CARDIAC REHAB | Facility: HOSPITAL | Age: 75
End: 2025-06-09
Payer: MEDICARE

## 2025-06-09 DIAGNOSIS — Z95.1 S/P CABG (CORONARY ARTERY BYPASS GRAFT): Primary | ICD-10-CM

## 2025-06-09 PROCEDURE — 93798 PHYS/QHP OP CAR RHAB W/ECG: CPT

## 2025-06-11 ENCOUNTER — OFFICE VISIT (OUTPATIENT)
Dept: CARDIAC SURGERY | Facility: CLINIC | Age: 75
End: 2025-06-11
Payer: MEDICARE

## 2025-06-11 ENCOUNTER — TREATMENT (OUTPATIENT)
Dept: CARDIAC REHAB | Facility: HOSPITAL | Age: 75
End: 2025-06-11
Payer: MEDICARE

## 2025-06-11 VITALS
HEIGHT: 72 IN | BODY MASS INDEX: 25.6 KG/M2 | HEART RATE: 74 BPM | RESPIRATION RATE: 18 BRPM | SYSTOLIC BLOOD PRESSURE: 108 MMHG | WEIGHT: 189 LBS | TEMPERATURE: 97.3 F | DIASTOLIC BLOOD PRESSURE: 74 MMHG | OXYGEN SATURATION: 98 %

## 2025-06-11 DIAGNOSIS — Z95.1 S/P CABG X 3: Primary | ICD-10-CM

## 2025-06-11 DIAGNOSIS — Z95.1 S/P CABG (CORONARY ARTERY BYPASS GRAFT): Primary | ICD-10-CM

## 2025-06-11 PROCEDURE — 93798 PHYS/QHP OP CAR RHAB W/ECG: CPT

## 2025-06-11 PROCEDURE — 1159F MED LIST DOCD IN RCRD: CPT | Performed by: THORACIC SURGERY (CARDIOTHORACIC VASCULAR SURGERY)

## 2025-06-11 PROCEDURE — 3074F SYST BP LT 130 MM HG: CPT | Performed by: THORACIC SURGERY (CARDIOTHORACIC VASCULAR SURGERY)

## 2025-06-11 PROCEDURE — 3078F DIAST BP <80 MM HG: CPT | Performed by: THORACIC SURGERY (CARDIOTHORACIC VASCULAR SURGERY)

## 2025-06-11 PROCEDURE — 99024 POSTOP FOLLOW-UP VISIT: CPT | Performed by: THORACIC SURGERY (CARDIOTHORACIC VASCULAR SURGERY)

## 2025-06-11 PROCEDURE — 1160F RVW MEDS BY RX/DR IN RCRD: CPT | Performed by: THORACIC SURGERY (CARDIOTHORACIC VASCULAR SURGERY)

## 2025-06-11 NOTE — PROGRESS NOTES
I am seeing him back for his follow-up after CABG x 3 on 5/7/2025.  He is doing great.  He has been doing cardiac rehab and feels well.  His incisions are all well-healed his lungs are clear and his chest is stable he ask about meloxicam.  I am not sure he should be taking it with his chronic kidney disease and his atherosclerosis.  He had a superficial thrombophlebitis in his left leg before surgery and he was not continued on Eliquis.  I guess were okay to not take it at this point.  He is active now.  I have released him to regular activities and we will see him back as needed.  He sees cardiology on Friday and will ask about medications.

## 2025-06-11 NOTE — LETTER
June 11, 2025     Lavell Dunaway MD  [address] [facility]  [address]  [facility] [address]    Patient: Marysol Bennett   YOB: 1950   Date of Visit: 6/11/2025     Dear Lavell Dunaway MD:       Thank you for referring Marysol Bennett to me for evaluation. Below are the relevant portions of my assessment and plan of care.    If you have questions, please do not hesitate to call me. I look forward to following Marysol along with you.         Sincerely,        Kg Grissom MD        CC: MD Praveena Garza Jr Samuel B, MD  06/11/25 1307  Sign when Signing Visit  I am seeing him back for his follow-up after CABG x 3 on 5/7/2025.  He is doing great.  He has been doing cardiac rehab and feels well.  His incisions are all well-healed his lungs are clear and his chest is stable he ask about meloxicam.  I am not sure he should be taking it with his chronic kidney disease and his atherosclerosis.  He had a superficial thrombophlebitis in his left leg before surgery and he was not continued on Eliquis.  I guess were okay to not take it at this point.  He is active now.  I have released him to regular activities and we will see him back as needed.  He sees cardiology on Friday and will ask about medications.

## 2025-06-13 ENCOUNTER — TREATMENT (OUTPATIENT)
Dept: CARDIAC REHAB | Facility: HOSPITAL | Age: 75
End: 2025-06-13
Payer: MEDICARE

## 2025-06-13 ENCOUNTER — OFFICE VISIT (OUTPATIENT)
Age: 75
End: 2025-06-13
Payer: MEDICARE

## 2025-06-13 VITALS
SYSTOLIC BLOOD PRESSURE: 112 MMHG | HEART RATE: 67 BPM | DIASTOLIC BLOOD PRESSURE: 70 MMHG | WEIGHT: 197 LBS | BODY MASS INDEX: 26.68 KG/M2 | HEIGHT: 72 IN | OXYGEN SATURATION: 99 %

## 2025-06-13 DIAGNOSIS — I10 ESSENTIAL HYPERTENSION: ICD-10-CM

## 2025-06-13 DIAGNOSIS — I25.10 CORONARY ARTERY DISEASE INVOLVING NATIVE CORONARY ARTERY OF NATIVE HEART WITHOUT ANGINA PECTORIS: Primary | ICD-10-CM

## 2025-06-13 DIAGNOSIS — Z95.1 S/P CABG X 3: ICD-10-CM

## 2025-06-13 DIAGNOSIS — E78.2 MIXED HYPERLIPIDEMIA: ICD-10-CM

## 2025-06-13 DIAGNOSIS — Z95.1 S/P CABG (CORONARY ARTERY BYPASS GRAFT): Primary | ICD-10-CM

## 2025-06-13 PROCEDURE — 93798 PHYS/QHP OP CAR RHAB W/ECG: CPT

## 2025-06-13 RX ORDER — METOPROLOL SUCCINATE 25 MG/1
25 TABLET, EXTENDED RELEASE ORAL DAILY
Qty: 90 TABLET | Refills: 3 | Status: SHIPPED | OUTPATIENT
Start: 2025-06-13

## 2025-06-13 NOTE — PROGRESS NOTES
Subjective:     Encounter Date:06/13/2025      Patient ID: Marysol Bennett is a 74 y.o. male.    Chief Complaint:  CAD    HPI:   74 y.o. male, followed by Dr. Yoo, with hypertension, hyperlipidemia, diabetes, history of provoked PE, multivessel CAD s/p CABG x 3, DIPIKA to LAD, LIMA to OM, SVG to RCA with Dr Grissom presents for follow-up.  The patient was mistakenly scheduled with me as his primary cardiologist Dr. Yoo and she had seen him as well as Crissy postoperatively.  He was having issues with what sounded like musculoskeletal pain but this has resolved.  He has been feeling great.     The following portions of the patient's history were reviewed and updated as appropriate: allergies, current medications, past family history, past medical history, past social history, past surgical history and problem list.     REVIEW OF SYSTEMS:   All systems reviewed.  Pertinent positives identified in HPI.  All other systems are negative.    Past Medical History:   Diagnosis Date    Arthritis     Bilateral carotid artery stenosis 05/19/2025    Coronary artery disease     Diverticulitis     DVT (deep venous thrombosis) 2023    LEFT LEG, LOG ROLLED ONTO ANKLE AND CAUSED BLOOD CLOTS    GERD (gastroesophageal reflux disease)     Hyperlipidemia     Hypertension     Pulmonary embolism 2023    RIGHT    Type 2 diabetes mellitus     Vertigo        Family History   Problem Relation Age of Onset    Intracerebral hemorrhage Mother     Heart attack Father     Diabetes Father     Diabetes Sister     Diabetes Paternal Grandmother     Malig Hyperthermia Neg Hx        Social History     Socioeconomic History    Marital status:      Spouse name: Nivia    Number of children: 2   Tobacco Use    Smoking status: Never     Passive exposure: Past    Smokeless tobacco: Never   Vaping Use    Vaping status: Never Used   Substance and Sexual Activity    Alcohol use: Yes     Comment: RARE / 12 beers a yr    Drug use: No    Sexual activity:  Defer       No Known Allergies    Past Surgical History:   Procedure Laterality Date    APPENDECTOMY  1977    CARDIAC CATHETERIZATION N/A 3/24/2025    Procedure: Coronary angiography;  Surgeon: Jai Black MD;  Location: [facility] CATH INVASIVE LOCATION;  Service: Cardiovascular;  Laterality: N/A;    CARDIAC CATHETERIZATION N/A 3/24/2025    Procedure: Left heart cath;  Surgeon: Jai Black MD;  Location: [facility] CATH INVASIVE LOCATION;  Service: Cardiovascular;  Laterality: N/A;    CARDIAC CATHETERIZATION N/A 3/24/2025    Procedure: Resting Full Cycle Ratio-Procedure not performed;  Surgeon: Jai Black MD;  Location: [facility] CATH INVASIVE LOCATION;  Service: Cardiovascular;  Laterality: N/A;    COLONOSCOPY      COLONOSCOPY N/A 08/11/2023    Procedure: COLONOSCOPY INTO CECUM;  Surgeon: Jefferson Rouse Jr., MD;  Location: [facility] ENDOSCOPY;  Service: General;  Laterality: N/A;  PRE:  SCREENING /    POST: DIVERTICULOSIS, HEMORRHOIDS    CORONARY ARTERY BYPASS GRAFT N/A 5/7/2025    Procedure: NAMAN PER ANESTHESIA, STERNOTOMY, CORONARY ARTERY BYPASS GRAFTS X 3 USING ENDOSCOPICALLY HARVESTED LEFT GREATER SAPHENOUS VEIN, LEFT AND RIGHT IMAs, PRP;  Surgeon: Jr Kg Grissom MD;  Location: [facility] CVOR;  Service: Cardiothoracic;  Laterality: N/A;    HAND SURGERY Left     KNEE ARTHROSCOPY Right     KNEE CARTILAGE SURGERY Left     ROTATOR CUFF REPAIR Left 2005    TOTAL HIP ARTHROPLASTY Right 2005    TOTAL SHOULDER REVERSE ARTHROPLASTY Left 10/2020       Procedures       Objective:         Vitals:    06/13/25 1013   BP: 112/70   Pulse: 67   SpO2: 99%       PHYSICAL EXAM:  GEN: well appearing, in NAD   HEENT: NCAT, EOMI, moist mucus membranes   Respiratory: CTAB, no wheezes, rales or rhonchi  CV: normal rate, regular rhythm, normal S1, S2, no murmurs, rubs, gallops, +2 radial pulses b/l  GI: soft, nontender, nondistended  MSK: no edema  Skin: no rash, warm, dry  Heme/Lymph: no bruising or bleeding  Neuro: Alert and Oriented x 3,  grossly normal motor function        Assessment:         (I25.10) Coronary artery disease involving native coronary artery of native heart without angina pectoris    (Z95.1) S/P CABG x 3    (I10) Essential hypertension    (E78.2) Mixed hyperlipidemia    74 y.o. male, followed by Dr. Yoo, with hypertension, hyperlipidemia, diabetes, history of provoked PE, multivessel CAD s/p CABG x 3, DIPIKA to LAD, LIMA to OM, SVG to RCA with Dr Grissom presents for follow-up.         Plan:       #CAD status post CABG  As above CABG x 3 with Dr. Grissom.  - Switch tartrate to Toprol to decrease pill burden  -Continue aspirin 81 mg daily, atorvastatin 40 mg daily    Thank you very much for referring this kind patient to me. Please call me with any questions or concerns.  Patient will schedule follow-up with Dr. Yoo in 6 months.         Jai Fisher MD, [facility], [facility]  06/13/25  [facility]    Outpatient Encounter Medications as of 6/13/2025   Medication Sig Dispense Refill    aspirin 81 MG EC tablet Take 1 tablet by mouth Daily. 90 tablet 0    atorvastatin (LIPITOR) 40 MG tablet Take 1 tablet by mouth once daily 90 tablet 3    BD Pen Needle Meredith 2nd Gen 32G X 4 MM misc 1 each by Other route Daily.      Cyanocobalamin (VITAMIN B-12 PO) Take 1 tablet by mouth Daily.      diphenhydrAMINE-acetaminophen (TYLENOL PM)  MG tablet per tablet Take 1 tablet by mouth At Night As Needed for Sleep.      esomeprazole (nexIUM) 40 MG capsule TAKE 1 CAPSULE BY MOUTH ONCE DAILY IN THE MORNING BEFORE BREAKFAST 90 capsule 0    glimepiride (AMARYL) 4 MG tablet Take 2 tablets by mouth 2 (Two) Times a Day.      HumaLOG KwikPen 100 UNIT/ML solution pen-injector Inject 8 Units under the skin into the appropriate area as directed Daily With Breakfast, Lunch & Dinner.      metFORMIN (GLUCOPHAGE) 500 MG tablet Take 2 tablets by mouth 2 (Two) Times a Day.      Prasterone, DHEA, (DHEA PO) Take 1 tablet by mouth Daily.      Probiotic Product  (PROBIOTIC PO) Take 1 tablet by mouth Daily.      sertraline (ZOLOFT) 25 MG tablet Take 1 tablet by mouth Daily. 90 tablet 2    Toujeo SoloStar 300 UNIT/ML solution pen-injector injection Inject 30 Units under the skin into the appropriate area as directed Daily.      Trulicity 0.75 MG/0.5ML solution pen-injector Inject 0.75 mg as directed 1 (One) Time Per Week. SUNDAY. HELD FOR SURGERY. LAST DOSE 4/27/25      TURMERIC PO Take 1 tablet by mouth Daily.      [DISCONTINUED] metoprolol tartrate (LOPRESSOR) 25 MG tablet Take 1 tablet by mouth Every 12 (Twelve) Hours for 90 days. 180 tablet 0    metoprolol succinate XL (TOPROL-XL) 25 MG 24 hr tablet Take 1 tablet by mouth Daily. 90 tablet 3     Facility-Administered Encounter Medications as of 6/13/2025   Medication Dose Route Frequency Provider Last Rate Last Admin    ondansetron (ZOFRAN) injection 4 mg  4 mg Intravenous Q6H PRN Crissy Atkinson APRN   4 mg at 05/27/25 1209

## 2025-06-16 ENCOUNTER — TREATMENT (OUTPATIENT)
Dept: CARDIAC REHAB | Facility: HOSPITAL | Age: 75
End: 2025-06-16
Payer: MEDICARE

## 2025-06-16 DIAGNOSIS — Z95.1 S/P CABG (CORONARY ARTERY BYPASS GRAFT): Primary | ICD-10-CM

## 2025-06-16 PROCEDURE — 93798 PHYS/QHP OP CAR RHAB W/ECG: CPT

## 2025-06-18 ENCOUNTER — TREATMENT (OUTPATIENT)
Dept: CARDIAC REHAB | Facility: HOSPITAL | Age: 75
End: 2025-06-18
Payer: MEDICARE

## 2025-06-18 DIAGNOSIS — Z95.1 S/P CABG (CORONARY ARTERY BYPASS GRAFT): Primary | ICD-10-CM

## 2025-06-18 PROCEDURE — 93798 PHYS/QHP OP CAR RHAB W/ECG: CPT

## 2025-06-20 ENCOUNTER — TREATMENT (OUTPATIENT)
Dept: CARDIAC REHAB | Facility: HOSPITAL | Age: 75
End: 2025-06-20
Payer: MEDICARE

## 2025-06-20 DIAGNOSIS — Z95.1 S/P CABG (CORONARY ARTERY BYPASS GRAFT): Primary | ICD-10-CM

## 2025-06-20 PROCEDURE — 93798 PHYS/QHP OP CAR RHAB W/ECG: CPT

## 2025-06-23 ENCOUNTER — TREATMENT (OUTPATIENT)
Dept: CARDIAC REHAB | Facility: HOSPITAL | Age: 75
End: 2025-06-23
Payer: MEDICARE

## 2025-06-23 DIAGNOSIS — Z95.1 S/P CABG (CORONARY ARTERY BYPASS GRAFT): Primary | ICD-10-CM

## 2025-06-23 PROCEDURE — 93798 PHYS/QHP OP CAR RHAB W/ECG: CPT

## 2025-06-25 ENCOUNTER — TREATMENT (OUTPATIENT)
Dept: CARDIAC REHAB | Facility: HOSPITAL | Age: 75
End: 2025-06-25
Payer: MEDICARE

## 2025-06-25 DIAGNOSIS — Z95.1 S/P CABG (CORONARY ARTERY BYPASS GRAFT): Primary | ICD-10-CM

## 2025-06-25 PROCEDURE — 93798 PHYS/QHP OP CAR RHAB W/ECG: CPT

## 2025-06-27 ENCOUNTER — TREATMENT (OUTPATIENT)
Dept: CARDIAC REHAB | Facility: HOSPITAL | Age: 75
End: 2025-06-27
Payer: MEDICARE

## 2025-06-27 DIAGNOSIS — Z95.1 S/P CABG (CORONARY ARTERY BYPASS GRAFT): Primary | ICD-10-CM

## 2025-06-27 PROCEDURE — 93798 PHYS/QHP OP CAR RHAB W/ECG: CPT

## 2025-06-30 ENCOUNTER — TREATMENT (OUTPATIENT)
Dept: CARDIAC REHAB | Facility: HOSPITAL | Age: 75
End: 2025-06-30
Payer: MEDICARE

## 2025-06-30 DIAGNOSIS — Z95.1 S/P CABG (CORONARY ARTERY BYPASS GRAFT): Primary | ICD-10-CM

## 2025-06-30 PROCEDURE — 93798 PHYS/QHP OP CAR RHAB W/ECG: CPT

## 2025-07-02 ENCOUNTER — TREATMENT (OUTPATIENT)
Dept: CARDIAC REHAB | Facility: HOSPITAL | Age: 75
End: 2025-07-02
Payer: MEDICARE

## 2025-07-02 DIAGNOSIS — Z95.1 S/P CABG (CORONARY ARTERY BYPASS GRAFT): Primary | ICD-10-CM

## 2025-07-02 PROCEDURE — 93798 PHYS/QHP OP CAR RHAB W/ECG: CPT

## 2025-07-07 ENCOUNTER — OFFICE VISIT (OUTPATIENT)
Dept: INTERNAL MEDICINE | Facility: CLINIC | Age: 75
End: 2025-07-07
Payer: MEDICARE

## 2025-07-07 ENCOUNTER — TREATMENT (OUTPATIENT)
Dept: CARDIAC REHAB | Facility: HOSPITAL | Age: 75
End: 2025-07-07
Payer: MEDICARE

## 2025-07-07 VITALS
HEIGHT: 72 IN | OXYGEN SATURATION: 96 % | DIASTOLIC BLOOD PRESSURE: 62 MMHG | SYSTOLIC BLOOD PRESSURE: 114 MMHG | TEMPERATURE: 97.9 F | WEIGHT: 198.7 LBS | HEART RATE: 77 BPM | BODY MASS INDEX: 26.91 KG/M2

## 2025-07-07 DIAGNOSIS — Z95.1 S/P CABG (CORONARY ARTERY BYPASS GRAFT): Primary | ICD-10-CM

## 2025-07-07 DIAGNOSIS — I25.10 CORONARY ARTERY DISEASE INVOLVING NATIVE CORONARY ARTERY OF NATIVE HEART WITHOUT ANGINA PECTORIS: ICD-10-CM

## 2025-07-07 DIAGNOSIS — B00.9 HERPES: ICD-10-CM

## 2025-07-07 DIAGNOSIS — I10 ESSENTIAL HYPERTENSION: ICD-10-CM

## 2025-07-07 DIAGNOSIS — E78.2 MIXED HYPERLIPIDEMIA: Primary | ICD-10-CM

## 2025-07-07 DIAGNOSIS — Z95.1 S/P CABG X 3: ICD-10-CM

## 2025-07-07 PROCEDURE — 1159F MED LIST DOCD IN RCRD: CPT | Performed by: FAMILY MEDICINE

## 2025-07-07 PROCEDURE — 3078F DIAST BP <80 MM HG: CPT | Performed by: FAMILY MEDICINE

## 2025-07-07 PROCEDURE — 93798 PHYS/QHP OP CAR RHAB W/ECG: CPT

## 2025-07-07 PROCEDURE — 1160F RVW MEDS BY RX/DR IN RCRD: CPT | Performed by: FAMILY MEDICINE

## 2025-07-07 PROCEDURE — 3074F SYST BP LT 130 MM HG: CPT | Performed by: FAMILY MEDICINE

## 2025-07-07 PROCEDURE — G2211 COMPLEX E/M VISIT ADD ON: HCPCS | Performed by: FAMILY MEDICINE

## 2025-07-07 PROCEDURE — 1126F AMNT PAIN NOTED NONE PRSNT: CPT | Performed by: FAMILY MEDICINE

## 2025-07-07 PROCEDURE — 99214 OFFICE O/P EST MOD 30 MIN: CPT | Performed by: FAMILY MEDICINE

## 2025-07-07 RX ORDER — ACYCLOVIR 50 MG/G
1 OINTMENT TOPICAL EVERY 6 HOURS
Qty: 15 G | Refills: 1 | Status: SHIPPED | OUTPATIENT
Start: 2025-07-07

## 2025-07-07 RX ORDER — METOPROLOL TARTRATE 25 MG/1
TABLET, FILM COATED ORAL
COMMUNITY
Start: 2025-05-12

## 2025-07-07 NOTE — PROGRESS NOTES
"Chief Complaint  Coronary Artery Disease and Heart Surgery Follow UP (5/7/25)    Subjective        Marysol Bennett presents to [facility] PRIMARY [facility]  History of Present Illness  Follows up for ongoing management of chronic medical problems hypertension hyperlipidemia coronary disease status post CABG doing much better no significant shortness of breath with exertion building his strength through cardiac rehab.  He is happy with results of the surgery although was hesitant initially  Objective   Vital Signs:  /62   Pulse 77   Temp 97.9 °F (36.6 °C)   Ht 182.9 cm (72\")   Wt 90.1 kg (198 lb 11.2 oz)   SpO2 96%   BMI 26.95 kg/m²   Estimated body mass index is 26.95 kg/m² as calculated from the following:    Height as of this encounter: 182.9 cm (72\").    Weight as of this encounter: 90.1 kg (198 lb 11.2 oz).            Physical Exam  Vitals and nursing note reviewed.   Constitutional:       Appearance: Normal appearance. He is not ill-appearing.   Cardiovascular:      Rate and Rhythm: Normal rate and regular rhythm.      Pulses: Normal pulses.      Heart sounds: Normal heart sounds.   Musculoskeletal:      Right lower leg: No edema.      Left lower leg: No edema.   Skin:     General: Skin is warm and dry.             Comments: Healed chest and thigh wound  Buttock is tender papules without pustules   Psychiatric:         Mood and Affect: Mood normal.         Behavior: Behavior normal.         Thought Content: Thought content normal.         Judgment: Judgment normal.        Result Review :    Common labs          5/12/2025    03:28 5/19/2025    13:36 5/27/2025    12:14   Common Labs   Glucose 209  187  191    BUN 33  28  34.0    Creatinine 1.33  1.40  1.55    Sodium 138  138  131    Potassium 4.5  5.4  5.2    Chloride 102  100  96    Calcium 8.7  9.8  10.1    Albumin  3.8     Total Bilirubin  0.4     Alkaline Phosphatase  96     AST (SGOT)  26     ALT (SGPT)  31     WBC  10.04   " "  Hemoglobin  10.0     Hematocrit  32.0     Platelets  521                 Assessment and Plan   Diagnoses and all orders for this visit:    1. Mixed hyperlipidemia (Primary)    2. Essential hypertension    3. S/P CABG x 3    4. Coronary artery disease involving native coronary artery of native heart without angina pectoris    5. Herpes    Other orders  -     acyclovir (ZOVIRAX) 5 % ointment; Apply 1 Application topically to the appropriate area as directed Every 6 (Six) Hours.  Dispense: 15 g; Refill: 1    Hyperlipidemia continue atorvastatin  Hypertension continue metoprolol  Coronary disease monitor symptoms  Herpes initiate acyclovir     This patient has a PCP that is the continuing focal point for all health care services, and the patient sees this physician to be evaluated for herpes. The inherent complexity that this code () captures is not in the clinical condition itself-- herpes --but rather the cognitition of the continued responsibility of being the focal point for all needed services for this patient.\"     Follow Up   No follow-ups on file.  Patient was given instructions and counseling regarding his condition or for health maintenance advice. Please see specific information pulled into the AVS if appropriate.             "

## 2025-07-09 ENCOUNTER — TELEPHONE (OUTPATIENT)
Dept: INTERNAL MEDICINE | Facility: CLINIC | Age: 75
End: 2025-07-09
Payer: MEDICARE

## 2025-07-09 ENCOUNTER — TREATMENT (OUTPATIENT)
Dept: CARDIAC REHAB | Facility: HOSPITAL | Age: 75
End: 2025-07-09
Payer: MEDICARE

## 2025-07-09 DIAGNOSIS — Z95.1 S/P CABG (CORONARY ARTERY BYPASS GRAFT): Primary | ICD-10-CM

## 2025-07-09 PROCEDURE — 93798 PHYS/QHP OP CAR RHAB W/ECG: CPT

## 2025-07-11 ENCOUNTER — TREATMENT (OUTPATIENT)
Dept: CARDIAC REHAB | Facility: HOSPITAL | Age: 75
End: 2025-07-11
Payer: MEDICARE

## 2025-07-11 DIAGNOSIS — Z95.1 S/P CABG (CORONARY ARTERY BYPASS GRAFT): Primary | ICD-10-CM

## 2025-07-11 PROCEDURE — 93798 PHYS/QHP OP CAR RHAB W/ECG: CPT

## 2025-07-14 ENCOUNTER — TREATMENT (OUTPATIENT)
Dept: CARDIAC REHAB | Facility: HOSPITAL | Age: 75
End: 2025-07-14
Payer: MEDICARE

## 2025-07-14 DIAGNOSIS — Z95.1 S/P CABG (CORONARY ARTERY BYPASS GRAFT): Primary | ICD-10-CM

## 2025-07-14 PROCEDURE — 93798 PHYS/QHP OP CAR RHAB W/ECG: CPT

## 2025-07-16 ENCOUNTER — TREATMENT (OUTPATIENT)
Dept: CARDIAC REHAB | Facility: HOSPITAL | Age: 75
End: 2025-07-16
Payer: MEDICARE

## 2025-07-16 DIAGNOSIS — Z95.1 S/P CABG (CORONARY ARTERY BYPASS GRAFT): Primary | ICD-10-CM

## 2025-07-16 PROCEDURE — 93798 PHYS/QHP OP CAR RHAB W/ECG: CPT

## 2025-07-18 ENCOUNTER — TREATMENT (OUTPATIENT)
Dept: CARDIAC REHAB | Facility: HOSPITAL | Age: 75
End: 2025-07-18
Payer: MEDICARE

## 2025-07-18 DIAGNOSIS — Z95.1 S/P CABG (CORONARY ARTERY BYPASS GRAFT): Primary | ICD-10-CM

## 2025-07-18 PROCEDURE — 93798 PHYS/QHP OP CAR RHAB W/ECG: CPT

## 2025-07-21 ENCOUNTER — TREATMENT (OUTPATIENT)
Dept: CARDIAC REHAB | Facility: HOSPITAL | Age: 75
End: 2025-07-21
Payer: MEDICARE

## 2025-07-21 DIAGNOSIS — Z95.1 S/P CABG (CORONARY ARTERY BYPASS GRAFT): Primary | ICD-10-CM

## 2025-07-21 PROCEDURE — 93798 PHYS/QHP OP CAR RHAB W/ECG: CPT

## 2025-07-23 ENCOUNTER — TREATMENT (OUTPATIENT)
Dept: CARDIAC REHAB | Facility: HOSPITAL | Age: 75
End: 2025-07-23
Payer: MEDICARE

## 2025-07-23 DIAGNOSIS — Z95.1 S/P CABG (CORONARY ARTERY BYPASS GRAFT): Primary | ICD-10-CM

## 2025-07-23 PROCEDURE — 93798 PHYS/QHP OP CAR RHAB W/ECG: CPT

## 2025-07-25 ENCOUNTER — TREATMENT (OUTPATIENT)
Dept: CARDIAC REHAB | Facility: HOSPITAL | Age: 75
End: 2025-07-25
Payer: MEDICARE

## 2025-07-25 DIAGNOSIS — Z95.1 S/P CABG (CORONARY ARTERY BYPASS GRAFT): Primary | ICD-10-CM

## 2025-07-25 PROCEDURE — 93798 PHYS/QHP OP CAR RHAB W/ECG: CPT

## 2025-07-28 ENCOUNTER — TREATMENT (OUTPATIENT)
Dept: CARDIAC REHAB | Facility: HOSPITAL | Age: 75
End: 2025-07-28
Payer: MEDICARE

## 2025-07-28 DIAGNOSIS — Z95.1 S/P CABG (CORONARY ARTERY BYPASS GRAFT): Primary | ICD-10-CM

## 2025-07-28 PROCEDURE — 93798 PHYS/QHP OP CAR RHAB W/ECG: CPT

## 2025-07-28 RX ORDER — ACYCLOVIR 50 MG/G
OINTMENT TOPICAL
Qty: 15 G | Refills: 0 | Status: SHIPPED | OUTPATIENT
Start: 2025-07-28

## 2025-08-01 ENCOUNTER — TREATMENT (OUTPATIENT)
Dept: CARDIAC REHAB | Facility: HOSPITAL | Age: 75
End: 2025-08-01
Payer: MEDICARE

## 2025-08-01 DIAGNOSIS — Z95.1 S/P CABG (CORONARY ARTERY BYPASS GRAFT): Primary | ICD-10-CM

## 2025-08-01 PROCEDURE — 93798 PHYS/QHP OP CAR RHAB W/ECG: CPT

## 2025-08-04 ENCOUNTER — TREATMENT (OUTPATIENT)
Dept: CARDIAC REHAB | Facility: HOSPITAL | Age: 75
End: 2025-08-04
Payer: MEDICARE

## 2025-08-04 DIAGNOSIS — Z95.1 S/P CABG (CORONARY ARTERY BYPASS GRAFT): Primary | ICD-10-CM

## 2025-08-04 PROCEDURE — 93798 PHYS/QHP OP CAR RHAB W/ECG: CPT

## 2025-08-06 ENCOUNTER — TREATMENT (OUTPATIENT)
Dept: CARDIAC REHAB | Facility: HOSPITAL | Age: 75
End: 2025-08-06
Payer: MEDICARE

## 2025-08-06 DIAGNOSIS — Z95.1 S/P CABG (CORONARY ARTERY BYPASS GRAFT): Primary | ICD-10-CM

## 2025-08-06 PROCEDURE — 93798 PHYS/QHP OP CAR RHAB W/ECG: CPT

## 2025-08-08 ENCOUNTER — TREATMENT (OUTPATIENT)
Dept: CARDIAC REHAB | Facility: HOSPITAL | Age: 75
End: 2025-08-08
Payer: MEDICARE

## 2025-08-08 DIAGNOSIS — Z95.1 S/P CABG (CORONARY ARTERY BYPASS GRAFT): Primary | ICD-10-CM

## 2025-08-19 ENCOUNTER — TELEPHONE (OUTPATIENT)
Dept: INTERNAL MEDICINE | Facility: CLINIC | Age: 75
End: 2025-08-19
Payer: MEDICARE

## 2025-08-20 RX ORDER — VALACYCLOVIR HYDROCHLORIDE 1 G/1
1000 TABLET, FILM COATED ORAL 3 TIMES DAILY
Qty: 21 TABLET | Refills: 0 | Status: SHIPPED | OUTPATIENT
Start: 2025-08-20 | End: 2025-08-27

## (undated) DEVICE — SYR LUERLOK 5CC

## (undated) DEVICE — Device

## (undated) DEVICE — 3M™ TEGADERM™ CHG DRESSING 25/CARTON 4 CARTONS/CASE 1658: Brand: TEGADERM™

## (undated) DEVICE — CVR PROB 96IN LF STRL

## (undated) DEVICE — CATH DIAG DXTERITY ULTRA TRANSRADIAL 4.0 5F 100CM 0/SH

## (undated) DEVICE — KT ORCA ORCAPOD DISP STRL

## (undated) DEVICE — SENSR O2 OXIMAX FNGR A/ 18IN NONSTR

## (undated) DEVICE — SYR LL TP 10ML STRL

## (undated) DEVICE — LN SMPL CO2 SHTRM SD STREAM W/M LUER

## (undated) DEVICE — DECANTER BAG 9": Brand: MEDLINE INDUSTRIES, INC.

## (undated) DEVICE — BLOWER/MISTER AXIOUS OPCAB W/TBG

## (undated) DEVICE — ROTATING SURGICAL PUNCHES, 1 PER POUCH: Brand: A&E MEDICAL / ROTATING SURGICAL PUNCHES

## (undated) DEVICE — SYS PERFUS SEP PLATLT W TIPS CUST

## (undated) DEVICE — PK HEART OPN 40

## (undated) DEVICE — DRN WND CH RND FUL/FLUT NO/TROC 3/8IN 28F

## (undated) DEVICE — CATH GUIDE LAUNCHER EBU3.5 6F 100CM

## (undated) DEVICE — CANN VESL FREE FLO 2MM

## (undated) DEVICE — SYS VASOVIEW2 HEMOPRO ENDOSCOPIC HARVST VESL

## (undated) DEVICE — ADAPT CLN BIOGUARD AIR/H2O DISP

## (undated) DEVICE — Device: Brand: ASAHI SION BLUE

## (undated) DEVICE — CORONARY ARTERY BYPASS GRAFT MARKERS, STAINLESS STEEL, DISTAL, WITHOUT HOLDER: Brand: ANASTOMARK CORONARY ARTERY BYPASS GRAFT MARKERS, STAINLESS STEEL, DISTAL

## (undated) DEVICE — DGW .035 FC J3MM 260CM TEF: Brand: EMERALD

## (undated) DEVICE — BLAKE SILICONE DRAINS CARDIO CONNECTOR 2:1: Brand: BLAKE

## (undated) DEVICE — SOL IRR NACL 0.9PCT BO 1000ML

## (undated) DEVICE — CONN TBG Y 6 IN 1 LF STRL

## (undated) DEVICE — DEV INDEFLATOR P/N 580289

## (undated) DEVICE — LOU OPEN HEART DR POLLOCK: Brand: MEDLINE INDUSTRIES, INC.

## (undated) DEVICE — DRSNG WND GEL FIBR OPTICELL AG PLS W/SLV LF 4X5IN  STRL

## (undated) DEVICE — TBG INSUFFLATION LUER LOCK: Brand: MEDLINE INDUSTRIES, INC.

## (undated) DEVICE — CANN O2 ETCO2 FITS ALL CONN CO2 SMPL A/ 7IN DISP LF

## (undated) DEVICE — GLIDESHEATH SLENDER STAINLESS STEEL KIT: Brand: GLIDESHEATH SLENDER

## (undated) DEVICE — SYR LUERLOK 30CC

## (undated) DEVICE — 1LYRTR 16FR10ML100%SILTMPS SNP: Brand: MEDLINE INDUSTRIES, INC.

## (undated) DEVICE — DRSNG WND GZ PAD BORDERED 4X8IN STRL

## (undated) DEVICE — LOU PACE DEFIB: Brand: MEDLINE INDUSTRIES, INC.

## (undated) DEVICE — KT MANIFLD CARDIAC

## (undated) DEVICE — EXOFIN PRECISION PEN HIGH VISCOSITY TOPICAL SKIN ADHESIVE: Brand: EXOFIN PRECISION PEN, 1G

## (undated) DEVICE — PK ATS CUST W CARDIOTOMY RESEVOIR

## (undated) DEVICE — PK PERFUS CUST W/CARDIOPLEGIA

## (undated) DEVICE — SENSR CERBRL O2 PK/2

## (undated) DEVICE — BG TRANSF W/COUPLER SPK 600ML

## (undated) DEVICE — THE VASC BAND HEMOSTAT IS A COMPRESSION DEVICE TO ASSIST HEMOSTASIS OF ARTERIAL, VENOUS AND HEMODIALYSIS PERCUTANEOUS ACCESS SITES.: Brand: VASC BAND™ HEMOSTAT

## (undated) DEVICE — FEMORAL ENTRY ANGIOGRAPHY SHIELD-YELLOW: Brand: RADPAD

## (undated) DEVICE — CANN ART SOFTFLOW EXT W/SUT/RNG 7MM

## (undated) DEVICE — SOL ISO/ALC 70PCT 4OZ

## (undated) DEVICE — DRP SLUSH WARMR MACH CIR 44X44IN

## (undated) DEVICE — HEMOCONCENTRATOR PERFUS LPS06

## (undated) DEVICE — GLV SURG BIOGEL M LTX PF 7 1/2

## (undated) DEVICE — PK CATH CARD 40

## (undated) DEVICE — TUBING, SUCTION, 1/4" X 10', STRAIGHT: Brand: MEDLINE